# Patient Record
Sex: FEMALE | Race: WHITE | Employment: OTHER | URBAN - METROPOLITAN AREA
[De-identification: names, ages, dates, MRNs, and addresses within clinical notes are randomized per-mention and may not be internally consistent; named-entity substitution may affect disease eponyms.]

---

## 2021-01-01 ENCOUNTER — HOSPITAL ENCOUNTER (INPATIENT)
Age: 76
LOS: 9 days | Discharge: HOSPICE | DRG: 166 | End: 2021-04-29
Attending: INTERNAL MEDICINE | Admitting: INTERNAL MEDICINE
Payer: MEDICARE

## 2021-01-01 ENCOUNTER — ANESTHESIA (OUTPATIENT)
Dept: ENDOSCOPY | Age: 76
DRG: 166 | End: 2021-01-01
Payer: MEDICARE

## 2021-01-01 ENCOUNTER — APPOINTMENT (OUTPATIENT)
Dept: GENERAL RADIOLOGY | Age: 76
DRG: 166 | End: 2021-01-01
Attending: INTERNAL MEDICINE
Payer: MEDICARE

## 2021-01-01 ENCOUNTER — OFFICE VISIT (OUTPATIENT)
Dept: FAMILY MEDICINE CLINIC | Age: 76
End: 2021-01-01
Payer: MEDICARE

## 2021-01-01 ENCOUNTER — APPOINTMENT (OUTPATIENT)
Dept: ENDOSCOPY | Age: 76
DRG: 166 | End: 2021-01-01
Attending: INTERNAL MEDICINE
Payer: MEDICARE

## 2021-01-01 ENCOUNTER — TELEPHONE (OUTPATIENT)
Dept: FAMILY MEDICINE CLINIC | Age: 76
End: 2021-01-01

## 2021-01-01 ENCOUNTER — ANESTHESIA EVENT (OUTPATIENT)
Dept: ENDOSCOPY | Age: 76
DRG: 166 | End: 2021-01-01
Payer: MEDICARE

## 2021-01-01 ENCOUNTER — APPOINTMENT (OUTPATIENT)
Dept: GENERAL RADIOLOGY | Age: 76
DRG: 166 | End: 2021-01-01
Attending: EMERGENCY MEDICINE
Payer: MEDICARE

## 2021-01-01 ENCOUNTER — APPOINTMENT (OUTPATIENT)
Dept: CT IMAGING | Age: 76
DRG: 166 | End: 2021-01-01
Attending: INTERNAL MEDICINE
Payer: MEDICARE

## 2021-01-01 ENCOUNTER — HOSPITAL ENCOUNTER (INPATIENT)
Age: 76
LOS: 2 days | DRG: 951 | End: 2021-05-01
Attending: FAMILY MEDICINE | Admitting: FAMILY MEDICINE
Payer: OTHER MISCELLANEOUS

## 2021-01-01 ENCOUNTER — HOSPICE ADMISSION (OUTPATIENT)
Dept: HOSPICE | Facility: HOSPICE | Age: 76
End: 2021-01-01
Payer: MEDICARE

## 2021-01-01 ENCOUNTER — APPOINTMENT (OUTPATIENT)
Dept: CT IMAGING | Age: 76
DRG: 166 | End: 2021-01-01
Attending: PHYSICIAN ASSISTANT
Payer: MEDICARE

## 2021-01-01 ENCOUNTER — HOME CARE VISIT (OUTPATIENT)
Dept: HOSPICE | Facility: HOSPICE | Age: 76
End: 2021-01-01
Payer: MEDICARE

## 2021-01-01 ENCOUNTER — HOSPITAL ENCOUNTER (OUTPATIENT)
Dept: GENERAL RADIOLOGY | Age: 76
Discharge: HOME | End: 2021-03-22
Payer: MEDICARE

## 2021-01-01 VITALS
DIASTOLIC BLOOD PRESSURE: 65 MMHG | OXYGEN SATURATION: 97 % | TEMPERATURE: 97.5 F | HEART RATE: 87 BPM | SYSTOLIC BLOOD PRESSURE: 132 MMHG | BODY MASS INDEX: 14.55 KG/M2 | WEIGHT: 96 LBS | HEIGHT: 68 IN

## 2021-01-01 VITALS
RESPIRATION RATE: 16 BRPM | HEART RATE: 94 BPM | WEIGHT: 81.99 LBS | SYSTOLIC BLOOD PRESSURE: 149 MMHG | BODY MASS INDEX: 12.43 KG/M2 | HEIGHT: 68 IN | DIASTOLIC BLOOD PRESSURE: 77 MMHG | TEMPERATURE: 97.8 F | OXYGEN SATURATION: 94 %

## 2021-01-01 VITALS
OXYGEN SATURATION: 96 % | HEART RATE: 100 BPM | TEMPERATURE: 98.7 F | SYSTOLIC BLOOD PRESSURE: 105 MMHG | DIASTOLIC BLOOD PRESSURE: 59 MMHG | RESPIRATION RATE: 16 BRPM

## 2021-01-01 VITALS
DIASTOLIC BLOOD PRESSURE: 56 MMHG | TEMPERATURE: 97.5 F | HEIGHT: 68 IN | BODY MASS INDEX: 14.4 KG/M2 | HEART RATE: 91 BPM | OXYGEN SATURATION: 96 % | WEIGHT: 95 LBS | SYSTOLIC BLOOD PRESSURE: 121 MMHG

## 2021-01-01 DIAGNOSIS — M25.511 CHRONIC RIGHT SHOULDER PAIN: ICD-10-CM

## 2021-01-01 DIAGNOSIS — R13.12 OROPHARYNGEAL DYSPHAGIA: ICD-10-CM

## 2021-01-01 DIAGNOSIS — G89.29 CHRONIC RIGHT SHOULDER PAIN: ICD-10-CM

## 2021-01-01 DIAGNOSIS — B37.81 CANDIDA ESOPHAGITIS (HCC): ICD-10-CM

## 2021-01-01 DIAGNOSIS — R07.9 CHEST PAIN, UNSPECIFIED TYPE: ICD-10-CM

## 2021-01-01 DIAGNOSIS — S22.060A COMPRESSION FRACTURE OF T8 VERTEBRA, INITIAL ENCOUNTER (HCC): ICD-10-CM

## 2021-01-01 DIAGNOSIS — M54.6 CHRONIC RIGHT-SIDED THORACIC BACK PAIN: ICD-10-CM

## 2021-01-01 DIAGNOSIS — C34.90 MALIGNANT NEOPLASM OF LUNG, UNSPECIFIED LATERALITY, UNSPECIFIED PART OF LUNG (HCC): ICD-10-CM

## 2021-01-01 DIAGNOSIS — R05.9 COUGH: ICD-10-CM

## 2021-01-01 DIAGNOSIS — Z51.5 HOSPICE CARE PATIENT: ICD-10-CM

## 2021-01-01 DIAGNOSIS — G89.29 CHRONIC RIGHT-SIDED THORACIC BACK PAIN: ICD-10-CM

## 2021-01-01 DIAGNOSIS — R91.8 RIGHT LOWER LOBE LUNG MASS: Primary | ICD-10-CM

## 2021-01-01 DIAGNOSIS — G89.3 CANCER ASSOCIATED PAIN: ICD-10-CM

## 2021-01-01 DIAGNOSIS — C79.51 MALIGNANT NEOPLASM METASTATIC TO BONE (HCC): ICD-10-CM

## 2021-01-01 DIAGNOSIS — R91.8 LUNG MASS: ICD-10-CM

## 2021-01-01 DIAGNOSIS — D75.839 THROMBOCYTOSIS: Primary | ICD-10-CM

## 2021-01-01 DIAGNOSIS — R63.4 UNINTENTIONAL WEIGHT LOSS: ICD-10-CM

## 2021-01-01 LAB
ABO + RH BLD: NORMAL
ALBUMIN SERPL-MCNC: 2.2 G/DL (ref 3.5–5)
ALBUMIN SERPL-MCNC: 2.3 G/DL (ref 3.5–5)
ALBUMIN SERPL-MCNC: 2.4 G/DL (ref 3.5–5)
ALBUMIN SERPL-MCNC: 2.5 G/DL (ref 3.5–5)
ALBUMIN SERPL-MCNC: 2.6 G/DL (ref 3.5–5)
ALBUMIN/GLOB SERPL: 0.5 {RATIO} (ref 1.1–2.2)
ALBUMIN/GLOB SERPL: 0.5 {RATIO} (ref 1.1–2.2)
ALBUMIN/GLOB SERPL: 0.6 {RATIO} (ref 1.1–2.2)
ALP SERPL-CCNC: 105 U/L (ref 45–117)
ALP SERPL-CCNC: 107 U/L (ref 45–117)
ALP SERPL-CCNC: 110 U/L (ref 45–117)
ALP SERPL-CCNC: 112 U/L (ref 45–117)
ALP SERPL-CCNC: 121 U/L (ref 45–117)
ALT SERPL-CCNC: 10 U/L (ref 12–78)
ALT SERPL-CCNC: 11 U/L (ref 12–78)
ALT SERPL-CCNC: 12 U/L (ref 12–78)
ALT SERPL-CCNC: 13 U/L (ref 12–78)
ALT SERPL-CCNC: 14 U/L (ref 12–78)
ANION GAP SERPL CALC-SCNC: 2 MMOL/L (ref 5–15)
ANION GAP SERPL CALC-SCNC: 4 MMOL/L (ref 5–15)
ANION GAP SERPL CALC-SCNC: 5 MMOL/L (ref 5–15)
ANION GAP SERPL CALC-SCNC: 6 MMOL/L (ref 5–15)
ANION GAP SERPL CALC-SCNC: 7 MMOL/L (ref 5–15)
APPEARANCE UR: ABNORMAL
APTT PPP: 31.9 SEC (ref 21.2–34.1)
AST SERPL W P-5'-P-CCNC: 10 U/L (ref 15–37)
AST SERPL W P-5'-P-CCNC: 10 U/L (ref 15–37)
AST SERPL W P-5'-P-CCNC: 16 U/L (ref 15–37)
AST SERPL W P-5'-P-CCNC: 6 U/L (ref 15–37)
AST SERPL W P-5'-P-CCNC: 8 U/L (ref 15–37)
ATRIAL RATE: 88 BPM
BACTERIA SPEC CULT: NORMAL
BACTERIA SPEC CULT: NORMAL
BACTERIA URNS QL MICRO: ABNORMAL /HPF
BASOPHILS # BLD: 0 K/UL (ref 0–0.1)
BASOPHILS NFR BLD: 0 % (ref 0–1)
BILIRUB SERPL-MCNC: 0.3 MG/DL (ref 0.2–1)
BILIRUB SERPL-MCNC: 0.3 MG/DL (ref 0.2–1)
BILIRUB SERPL-MCNC: 0.4 MG/DL (ref 0.2–1)
BILIRUB SERPL-MCNC: 0.4 MG/DL (ref 0.2–1)
BILIRUB SERPL-MCNC: 0.6 MG/DL (ref 0.2–1)
BILIRUB UR QL: NEGATIVE
BLOOD GROUP ANTIBODIES SERPL: NEGATIVE
BNP SERPL-MCNC: 489 PG/ML
BUN SERPL-MCNC: 11 MG/DL (ref 6–20)
BUN SERPL-MCNC: 11 MG/DL (ref 6–20)
BUN SERPL-MCNC: 12 MG/DL (ref 6–20)
BUN SERPL-MCNC: 13 MG/DL (ref 6–20)
BUN SERPL-MCNC: 14 MG/DL (ref 6–20)
BUN SERPL-MCNC: 14 MG/DL (ref 6–20)
BUN SERPL-MCNC: 15 MG/DL (ref 6–20)
BUN SERPL-MCNC: 15 MG/DL (ref 6–20)
BUN SERPL-MCNC: 19 MG/DL (ref 6–20)
BUN SERPL-MCNC: 23 MG/DL (ref 6–20)
BUN SERPL-MCNC: 35 MG/DL (ref 6–20)
BUN/CREAT SERPL: 22 (ref 12–20)
BUN/CREAT SERPL: 23 (ref 12–20)
BUN/CREAT SERPL: 23 (ref 12–20)
BUN/CREAT SERPL: 25 (ref 12–20)
BUN/CREAT SERPL: 26 (ref 12–20)
BUN/CREAT SERPL: 26 (ref 12–20)
BUN/CREAT SERPL: 28 (ref 12–20)
BUN/CREAT SERPL: 29 (ref 12–20)
BUN/CREAT SERPL: 35 (ref 12–20)
BUN/CREAT SERPL: 35 (ref 12–20)
BUN/CREAT SERPL: 36 (ref 12–20)
CA-I BLD-MCNC: 10 MG/DL (ref 8.5–10.1)
CA-I BLD-MCNC: 10.2 MG/DL (ref 8.5–10.1)
CA-I BLD-MCNC: 10.2 MG/DL (ref 8.5–10.1)
CA-I BLD-MCNC: 10.6 MG/DL (ref 8.5–10.1)
CA-I BLD-MCNC: 10.7 MG/DL (ref 8.5–10.1)
CA-I BLD-MCNC: 11.1 MG/DL (ref 8.5–10.1)
CA-I BLD-MCNC: 11.1 MG/DL (ref 8.5–10.1)
CA-I BLD-MCNC: 12 MG/DL (ref 8.5–10.1)
CA-I BLD-MCNC: 12.2 MG/DL (ref 8.5–10.1)
CA-I BLD-MCNC: 12.5 MG/DL (ref 8.5–10.1)
CA-I BLD-MCNC: 12.7 MG/DL (ref 8.5–10.1)
CALCULATED P AXIS, ECG09: 76 DEGREES
CALCULATED R AXIS, ECG10: -9 DEGREES
CALCULATED T AXIS, ECG11: 45 DEGREES
CHLORIDE SERPL-SCNC: 104 MMOL/L (ref 97–108)
CHLORIDE SERPL-SCNC: 105 MMOL/L (ref 97–108)
CHLORIDE SERPL-SCNC: 106 MMOL/L (ref 97–108)
CHLORIDE SERPL-SCNC: 107 MMOL/L (ref 97–108)
CHLORIDE SERPL-SCNC: 108 MMOL/L (ref 97–108)
CHLORIDE SERPL-SCNC: 113 MMOL/L (ref 97–108)
CHLORIDE SERPL-SCNC: 116 MMOL/L (ref 97–108)
CK SERPL-CCNC: 23 NG/ML (ref 26–192)
CO2 SERPL-SCNC: 23 MMOL/L (ref 21–32)
CO2 SERPL-SCNC: 28 MMOL/L (ref 21–32)
CO2 SERPL-SCNC: 28 MMOL/L (ref 21–32)
CO2 SERPL-SCNC: 29 MMOL/L (ref 21–32)
CO2 SERPL-SCNC: 31 MMOL/L (ref 21–32)
CO2 SERPL-SCNC: 32 MMOL/L (ref 21–32)
COLOR UR: ABNORMAL
COVID-19 RAPID TEST, COVR: NOT DETECTED
CREAT SERPL-MCNC: 0.43 MG/DL (ref 0.55–1.02)
CREAT SERPL-MCNC: 0.47 MG/DL (ref 0.55–1.02)
CREAT SERPL-MCNC: 0.48 MG/DL (ref 0.55–1.02)
CREAT SERPL-MCNC: 0.54 MG/DL (ref 0.55–1.02)
CREAT SERPL-MCNC: 0.55 MG/DL (ref 0.55–1.02)
CREAT SERPL-MCNC: 0.57 MG/DL (ref 0.55–1.02)
CREAT SERPL-MCNC: 0.63 MG/DL (ref 0.55–1.02)
CREAT SERPL-MCNC: 0.78 MG/DL (ref 0.55–1.02)
CREAT SERPL-MCNC: 0.96 MG/DL (ref 0.55–1.02)
DIAGNOSIS, 93000: NORMAL
DIFFERENTIAL METHOD BLD: ABNORMAL
EOSINOPHIL # BLD: 0 K/UL (ref 0–0.4)
EOSINOPHIL # BLD: 0.1 K/UL (ref 0–0.4)
EOSINOPHIL NFR BLD: 0 % (ref 0–7)
ERYTHROCYTE [DISTWIDTH] IN BLOOD BY AUTOMATED COUNT: 17.4 % (ref 11.5–14.5)
ERYTHROCYTE [DISTWIDTH] IN BLOOD BY AUTOMATED COUNT: 17.4 % (ref 11.5–14.5)
ERYTHROCYTE [DISTWIDTH] IN BLOOD BY AUTOMATED COUNT: 17.6 % (ref 11.5–14.5)
ERYTHROCYTE [DISTWIDTH] IN BLOOD BY AUTOMATED COUNT: 17.8 % (ref 11.5–14.5)
ERYTHROCYTE [DISTWIDTH] IN BLOOD BY AUTOMATED COUNT: 17.9 % (ref 11.5–14.5)
ERYTHROCYTE [DISTWIDTH] IN BLOOD BY AUTOMATED COUNT: 18.3 % (ref 11.5–14.5)
ERYTHROCYTE [DISTWIDTH] IN BLOOD BY AUTOMATED COUNT: 18.6 % (ref 11.5–14.5)
ERYTHROCYTE [DISTWIDTH] IN BLOOD BY AUTOMATED COUNT: 18.8 % (ref 11.5–14.5)
FLUAV AG NPH QL IA: NEGATIVE
FLUBV AG NOSE QL IA: NEGATIVE
GLOBULIN SER CALC-MCNC: 4.1 G/DL (ref 2–4)
GLOBULIN SER CALC-MCNC: 4.2 G/DL (ref 2–4)
GLOBULIN SER CALC-MCNC: 4.3 G/DL (ref 2–4)
GLOBULIN SER CALC-MCNC: 4.4 G/DL (ref 2–4)
GLOBULIN SER CALC-MCNC: 4.4 G/DL (ref 2–4)
GLUCOSE BLD STRIP.AUTO-MCNC: 127 MG/DL (ref 65–100)
GLUCOSE SERPL-MCNC: 104 MG/DL (ref 65–100)
GLUCOSE SERPL-MCNC: 106 MG/DL (ref 65–100)
GLUCOSE SERPL-MCNC: 119 MG/DL (ref 65–100)
GLUCOSE SERPL-MCNC: 138 MG/DL (ref 65–100)
GLUCOSE SERPL-MCNC: 74 MG/DL (ref 65–100)
GLUCOSE SERPL-MCNC: 82 MG/DL (ref 65–100)
GLUCOSE SERPL-MCNC: 83 MG/DL (ref 65–100)
GLUCOSE SERPL-MCNC: 88 MG/DL (ref 65–100)
GLUCOSE SERPL-MCNC: 93 MG/DL (ref 65–100)
GLUCOSE SERPL-MCNC: 95 MG/DL (ref 65–100)
GLUCOSE SERPL-MCNC: 98 MG/DL (ref 65–100)
GLUCOSE UR STRIP.AUTO-MCNC: NEGATIVE MG/DL
GRAM STN SPEC: NORMAL
GRAM STN SPEC: NORMAL
HCT VFR BLD AUTO: 25.1 % (ref 35–47)
HCT VFR BLD AUTO: 27.7 % (ref 35–47)
HCT VFR BLD AUTO: 27.9 % (ref 35–47)
HCT VFR BLD AUTO: 28.1 % (ref 35–47)
HCT VFR BLD AUTO: 28.4 % (ref 35–47)
HCT VFR BLD AUTO: 28.9 % (ref 35–47)
HCT VFR BLD AUTO: 29 % (ref 35–47)
HCT VFR BLD AUTO: 29.5 % (ref 35–47)
HCT VFR BLD AUTO: 31 % (ref 35–47)
HCT VFR BLD AUTO: 31.8 % (ref 35–47)
HGB BLD-MCNC: 7.6 G/DL (ref 11.5–16)
HGB BLD-MCNC: 8.3 G/DL (ref 11.5–16)
HGB BLD-MCNC: 8.4 G/DL (ref 11.5–16)
HGB BLD-MCNC: 8.5 G/DL (ref 11.5–16)
HGB BLD-MCNC: 8.5 G/DL (ref 11.5–16)
HGB BLD-MCNC: 8.7 G/DL (ref 11.5–16)
HGB BLD-MCNC: 8.9 G/DL (ref 11.5–16)
HGB BLD-MCNC: 8.9 G/DL (ref 11.5–16)
HGB BLD-MCNC: 9.1 G/DL (ref 11.5–16)
HGB BLD-MCNC: 9.5 G/DL (ref 11.5–16)
HGB UR QL STRIP: ABNORMAL
IMM GRANULOCYTES # BLD AUTO: 0 K/UL (ref 0–0.04)
IMM GRANULOCYTES # BLD AUTO: 0.1 K/UL (ref 0–0.04)
IMM GRANULOCYTES NFR BLD AUTO: 0 % (ref 0–0.5)
INR PPP: 1.1 (ref 0.9–1.1)
IRON SATN MFR SERPL: 31 % (ref 20–50)
IRON SERPL-MCNC: 43 UG/DL (ref 35–150)
KETONES UR QL STRIP.AUTO: NEGATIVE MG/DL
LEUKOCYTE ESTERASE UR QL STRIP.AUTO: ABNORMAL
LYMPHOCYTES # BLD: 0.5 K/UL (ref 0.8–3.5)
LYMPHOCYTES # BLD: 0.6 K/UL (ref 0.8–3.5)
LYMPHOCYTES # BLD: 0.6 K/UL (ref 0.8–3.5)
LYMPHOCYTES # BLD: 0.8 K/UL (ref 0.8–3.5)
LYMPHOCYTES # BLD: 0.8 K/UL (ref 0.8–3.5)
LYMPHOCYTES # BLD: 0.9 K/UL (ref 0.8–3.5)
LYMPHOCYTES # BLD: 1.3 K/UL (ref 0.8–3.5)
LYMPHOCYTES # BLD: 1.3 K/UL (ref 0.8–3.5)
LYMPHOCYTES NFR BLD: 3 % (ref 12–49)
LYMPHOCYTES NFR BLD: 4 % (ref 12–49)
LYMPHOCYTES NFR BLD: 5 % (ref 12–49)
LYMPHOCYTES NFR BLD: 5 % (ref 12–49)
LYMPHOCYTES NFR BLD: 8 % (ref 12–49)
LYMPHOCYTES NFR BLD: 8 % (ref 12–49)
MAGNESIUM SERPL-MCNC: 2.1 MG/DL (ref 1.6–2.4)
MAGNESIUM SERPL-MCNC: 2.2 MG/DL (ref 1.6–2.4)
MAGNESIUM SERPL-MCNC: 2.2 MG/DL (ref 1.6–2.4)
MAGNESIUM SERPL-MCNC: 2.3 MG/DL (ref 1.6–2.4)
MCH RBC QN AUTO: 24.7 PG (ref 26–34)
MCH RBC QN AUTO: 24.8 PG (ref 26–34)
MCH RBC QN AUTO: 24.9 PG (ref 26–34)
MCH RBC QN AUTO: 24.9 PG (ref 26–34)
MCH RBC QN AUTO: 25 PG (ref 26–34)
MCH RBC QN AUTO: 25.1 PG (ref 26–34)
MCH RBC QN AUTO: 25.1 PG (ref 26–34)
MCH RBC QN AUTO: 25.2 PG (ref 26–34)
MCHC RBC AUTO-ENTMCNC: 29.4 G/DL (ref 30–36.5)
MCHC RBC AUTO-ENTMCNC: 29.7 G/DL (ref 30–36.5)
MCHC RBC AUTO-ENTMCNC: 29.9 G/DL (ref 30–36.5)
MCHC RBC AUTO-ENTMCNC: 29.9 G/DL (ref 30–36.5)
MCHC RBC AUTO-ENTMCNC: 30 G/DL (ref 30–36.5)
MCHC RBC AUTO-ENTMCNC: 30.2 G/DL (ref 30–36.5)
MCHC RBC AUTO-ENTMCNC: 30.2 G/DL (ref 30–36.5)
MCHC RBC AUTO-ENTMCNC: 30.3 G/DL (ref 30–36.5)
MCHC RBC AUTO-ENTMCNC: 30.3 G/DL (ref 30–36.5)
MCHC RBC AUTO-ENTMCNC: 30.8 G/DL (ref 30–36.5)
MCV RBC AUTO: 81.4 FL (ref 80–99)
MCV RBC AUTO: 81.8 FL (ref 80–99)
MCV RBC AUTO: 82.2 FL (ref 80–99)
MCV RBC AUTO: 82.4 FL (ref 80–99)
MCV RBC AUTO: 82.9 FL (ref 80–99)
MCV RBC AUTO: 83 FL (ref 80–99)
MCV RBC AUTO: 83 FL (ref 80–99)
MCV RBC AUTO: 83.6 FL (ref 80–99)
MCV RBC AUTO: 83.7 FL (ref 80–99)
MCV RBC AUTO: 84.2 FL (ref 80–99)
MONOCYTES # BLD: 0.4 K/UL (ref 0–1)
MONOCYTES # BLD: 0.6 K/UL (ref 0–1)
MONOCYTES # BLD: 0.7 K/UL (ref 0–1)
MONOCYTES # BLD: 0.8 K/UL (ref 0–1)
MONOCYTES # BLD: 0.9 K/UL (ref 0–1)
MONOCYTES # BLD: 0.9 K/UL (ref 0–1)
MONOCYTES # BLD: 1 K/UL (ref 0–1)
MONOCYTES # BLD: 1 K/UL (ref 0–1)
MONOCYTES NFR BLD: 3 % (ref 5–13)
MONOCYTES NFR BLD: 3 % (ref 5–13)
MONOCYTES NFR BLD: 4 % (ref 5–13)
MONOCYTES NFR BLD: 4 % (ref 5–13)
MONOCYTES NFR BLD: 5 % (ref 5–13)
MONOCYTES NFR BLD: 5 % (ref 5–13)
MONOCYTES NFR BLD: 6 % (ref 5–13)
MONOCYTES NFR BLD: 6 % (ref 5–13)
MUCOUS THREADS URNS QL MICRO: ABNORMAL /LPF
NEUTS SEG # BLD: 13.8 K/UL (ref 1.8–8)
NEUTS SEG # BLD: 14.2 K/UL (ref 1.8–8)
NEUTS SEG # BLD: 14.7 K/UL (ref 1.8–8)
NEUTS SEG # BLD: 15.2 K/UL (ref 1.8–8)
NEUTS SEG # BLD: 15.6 K/UL (ref 1.8–8)
NEUTS SEG # BLD: 16 K/UL (ref 1.8–8)
NEUTS SEG # BLD: 19 K/UL (ref 1.8–8)
NEUTS SEG # BLD: 19.3 K/UL (ref 1.8–8)
NEUTS SEG NFR BLD: 86 % (ref 32–75)
NEUTS SEG NFR BLD: 86 % (ref 32–75)
NEUTS SEG NFR BLD: 91 % (ref 32–75)
NEUTS SEG NFR BLD: 92 % (ref 32–75)
NEUTS SEG NFR BLD: 94 % (ref 32–75)
NITRITE UR QL STRIP.AUTO: POSITIVE
P-R INTERVAL, ECG05: 154 MS
PERFORMED BY, TECHID: ABNORMAL
PH UR STRIP: 5 [PH] (ref 5–8)
PLATELET # BLD AUTO: 350 K/UL (ref 150–400)
PLATELET # BLD AUTO: 437 K/UL (ref 150–400)
PLATELET # BLD AUTO: 523 K/UL (ref 150–400)
PLATELET # BLD AUTO: 534 K/UL (ref 150–400)
PLATELET # BLD AUTO: 541 K/UL (ref 150–400)
PLATELET # BLD AUTO: 549 K/UL (ref 150–400)
PLATELET # BLD AUTO: 565 K/UL (ref 150–400)
PLATELET # BLD AUTO: 576 K/UL (ref 150–400)
PLATELET # BLD AUTO: 604 K/UL (ref 150–400)
PLATELET # BLD AUTO: 606 K/UL (ref 150–400)
PMV BLD AUTO: 8.5 FL (ref 8.9–12.9)
PMV BLD AUTO: 8.6 FL (ref 8.9–12.9)
PMV BLD AUTO: 8.7 FL (ref 8.9–12.9)
PMV BLD AUTO: 8.8 FL (ref 8.9–12.9)
PMV BLD AUTO: 9.2 FL (ref 8.9–12.9)
POTASSIUM SERPL-SCNC: 2.6 MMOL/L (ref 3.5–5.1)
POTASSIUM SERPL-SCNC: 2.7 MMOL/L (ref 3.5–5.1)
POTASSIUM SERPL-SCNC: 3 MMOL/L (ref 3.5–5.1)
POTASSIUM SERPL-SCNC: 3.2 MMOL/L (ref 3.5–5.1)
POTASSIUM SERPL-SCNC: 3.2 MMOL/L (ref 3.5–5.1)
POTASSIUM SERPL-SCNC: 3.4 MMOL/L (ref 3.5–5.1)
POTASSIUM SERPL-SCNC: 3.7 MMOL/L (ref 3.5–5.1)
POTASSIUM SERPL-SCNC: 3.8 MMOL/L (ref 3.5–5.1)
POTASSIUM SERPL-SCNC: 3.8 MMOL/L (ref 3.5–5.1)
POTASSIUM SERPL-SCNC: 4.5 MMOL/L (ref 3.5–5.1)
PROT SERPL-MCNC: 6.3 G/DL (ref 6.4–8.2)
PROT SERPL-MCNC: 6.5 G/DL (ref 6.4–8.2)
PROT SERPL-MCNC: 6.7 G/DL (ref 6.4–8.2)
PROT SERPL-MCNC: 6.9 G/DL (ref 6.4–8.2)
PROT SERPL-MCNC: 7 G/DL (ref 6.4–8.2)
PROT UR STRIP-MCNC: NEGATIVE MG/DL
PROTHROMBIN TIME: 14.2 SEC (ref 11.9–14.7)
Q-T INTERVAL, ECG07: 340 MS
QRS DURATION, ECG06: 70 MS
QTC CALCULATION (BEZET), ECG08: 411 MS
RBC # BLD AUTO: 3.07 M/UL (ref 3.8–5.2)
RBC # BLD AUTO: 3.36 M/UL (ref 3.8–5.2)
RBC # BLD AUTO: 3.37 M/UL (ref 3.8–5.2)
RBC # BLD AUTO: 3.39 M/UL (ref 3.8–5.2)
RBC # BLD AUTO: 3.42 M/UL (ref 3.8–5.2)
RBC # BLD AUTO: 3.52 M/UL (ref 3.8–5.2)
RBC # BLD AUTO: 3.53 M/UL (ref 3.8–5.2)
RBC # BLD AUTO: 3.55 M/UL (ref 3.8–5.2)
RBC # BLD AUTO: 3.68 M/UL (ref 3.8–5.2)
RBC # BLD AUTO: 3.8 M/UL (ref 3.8–5.2)
RBC #/AREA URNS HPF: ABNORMAL /HPF (ref 0–5)
REFERENCE LAB,REFLB: NORMAL
SARS-COV-2, COV2: NORMAL
SODIUM SERPL-SCNC: 135 MMOL/L (ref 136–145)
SODIUM SERPL-SCNC: 138 MMOL/L (ref 136–145)
SODIUM SERPL-SCNC: 139 MMOL/L (ref 136–145)
SODIUM SERPL-SCNC: 140 MMOL/L (ref 136–145)
SODIUM SERPL-SCNC: 141 MMOL/L (ref 136–145)
SODIUM SERPL-SCNC: 143 MMOL/L (ref 136–145)
SODIUM SERPL-SCNC: 146 MMOL/L (ref 136–145)
SODIUM SERPL-SCNC: 149 MMOL/L (ref 136–145)
SPECIAL REQUESTS,SREQ: NORMAL
SPECIAL REQUESTS,SREQ: NORMAL
SPECIMEN EXP DATE BLD: NORMAL
SPECIMEN SOURCE: NORMAL
TEST DESCRIPTION:,ATST: NORMAL
THERAPEUTIC RANGE,PTTT: NORMAL SEC (ref 82–109)
TIBC SERPL-MCNC: 138 UG/DL (ref 250–450)
TROPONIN I SERPL-MCNC: <0.05 NG/ML
TSH SERPL DL<=0.05 MIU/L-ACNC: 1.99 UIU/ML (ref 0.36–3.74)
UROBILINOGEN UR QL STRIP.AUTO: 0.1 EU/DL (ref 0.1–1)
VENTRICULAR RATE, ECG03: 88 BPM
WBC # BLD AUTO: 13.4 K/UL (ref 3.6–11)
WBC # BLD AUTO: 15 K/UL (ref 3.6–11)
WBC # BLD AUTO: 16 K/UL (ref 3.6–11)
WBC # BLD AUTO: 16.6 K/UL (ref 3.6–11)
WBC # BLD AUTO: 16.7 K/UL (ref 3.6–11)
WBC # BLD AUTO: 17 K/UL (ref 3.6–11)
WBC # BLD AUTO: 17 K/UL (ref 3.6–11)
WBC # BLD AUTO: 17.5 K/UL (ref 3.6–11)
WBC # BLD AUTO: 20.5 K/UL (ref 3.6–11)
WBC # BLD AUTO: 20.6 K/UL (ref 3.6–11)
WBC URNS QL MICRO: ABNORMAL /HPF (ref 0–4)

## 2021-01-01 PROCEDURE — G8427 DOCREV CUR MEDS BY ELIG CLIN: HCPCS | Performed by: FAMILY MEDICINE

## 2021-01-01 PROCEDURE — 74011250637 HC RX REV CODE- 250/637: Performed by: INTERNAL MEDICINE

## 2021-01-01 PROCEDURE — 36415 COLL VENOUS BLD VENIPUNCTURE: CPT

## 2021-01-01 PROCEDURE — 65270000029 HC RM PRIVATE

## 2021-01-01 PROCEDURE — 2709999900 HC NON-CHARGEABLE SUPPLY: Performed by: INTERNAL MEDICINE

## 2021-01-01 PROCEDURE — G8419 CALC BMI OUT NRM PARAM NOF/U: HCPCS | Performed by: FAMILY MEDICINE

## 2021-01-01 PROCEDURE — 74011250636 HC RX REV CODE- 250/636: Performed by: FAMILY MEDICINE

## 2021-01-01 PROCEDURE — 88360 TUMOR IMMUNOHISTOCHEM/MANUAL: CPT

## 2021-01-01 PROCEDURE — 74011000258 HC RX REV CODE- 258: Performed by: INTERNAL MEDICINE

## 2021-01-01 PROCEDURE — 74011250636 HC RX REV CODE- 250/636: Performed by: ANESTHESIOLOGY

## 2021-01-01 PROCEDURE — 1101F PT FALLS ASSESS-DOCD LE1/YR: CPT | Performed by: FAMILY MEDICINE

## 2021-01-01 PROCEDURE — 71045 X-RAY EXAM CHEST 1 VIEW: CPT

## 2021-01-01 PROCEDURE — 74011250636 HC RX REV CODE- 250/636: Performed by: NURSE ANESTHETIST, CERTIFIED REGISTERED

## 2021-01-01 PROCEDURE — 0WBC3ZX EXCISION OF MEDIASTINUM, PERCUTANEOUS APPROACH, DIAGNOSTIC: ICD-10-PCS | Performed by: INTERNAL MEDICINE

## 2021-01-01 PROCEDURE — 76040000003: Performed by: INTERNAL MEDICINE

## 2021-01-01 PROCEDURE — 88108 CYTOPATH CONCENTRATE TECH: CPT

## 2021-01-01 PROCEDURE — 83735 ASSAY OF MAGNESIUM: CPT

## 2021-01-01 PROCEDURE — 83880 ASSAY OF NATRIURETIC PEPTIDE: CPT

## 2021-01-01 PROCEDURE — 74011250636 HC RX REV CODE- 250/636: Performed by: INTERNAL MEDICINE

## 2021-01-01 PROCEDURE — 88305 TISSUE EXAM BY PATHOLOGIST: CPT

## 2021-01-01 PROCEDURE — 84132 ASSAY OF SERUM POTASSIUM: CPT

## 2021-01-01 PROCEDURE — G8510 SCR DEP NEG, NO PLAN REQD: HCPCS | Performed by: FAMILY MEDICINE

## 2021-01-01 PROCEDURE — 74011250636 HC RX REV CODE- 250/636: Performed by: HOSPITALIST

## 2021-01-01 PROCEDURE — 94760 N-INVAS EAR/PLS OXIMETRY 1: CPT

## 2021-01-01 PROCEDURE — 71260 CT THORAX DX C+: CPT

## 2021-01-01 PROCEDURE — 3017F COLORECTAL CA SCREEN DOC REV: CPT | Performed by: FAMILY MEDICINE

## 2021-01-01 PROCEDURE — 85025 COMPLETE CBC W/AUTO DIFF WBC: CPT

## 2021-01-01 PROCEDURE — 74011000250 HC RX REV CODE- 250: Performed by: FAMILY MEDICINE

## 2021-01-01 PROCEDURE — 74019 RADEX ABDOMEN 2 VIEWS: CPT

## 2021-01-01 PROCEDURE — 85610 PROTHROMBIN TIME: CPT

## 2021-01-01 PROCEDURE — 81001 URINALYSIS AUTO W/SCOPE: CPT

## 2021-01-01 PROCEDURE — 0BBD8ZX EXCISION OF RIGHT MIDDLE LUNG LOBE, VIA NATURAL OR ARTIFICIAL OPENING ENDOSCOPIC, DIAGNOSTIC: ICD-10-PCS | Performed by: INTERNAL MEDICINE

## 2021-01-01 PROCEDURE — 3336500001 HSPC ELECTION

## 2021-01-01 PROCEDURE — 99285 EMERGENCY DEPT VISIT HI MDM: CPT

## 2021-01-01 PROCEDURE — 87040 BLOOD CULTURE FOR BACTERIA: CPT

## 2021-01-01 PROCEDURE — 81455 SO/HL 51/>GSAP DNA/DNA&RNA: CPT

## 2021-01-01 PROCEDURE — 0B9K3ZX DRAINAGE OF RIGHT LUNG, PERCUTANEOUS APPROACH, DIAGNOSTIC: ICD-10-PCS | Performed by: INTERNAL MEDICINE

## 2021-01-01 PROCEDURE — 88381 MICRODISSECTION MANUAL: CPT

## 2021-01-01 PROCEDURE — 0DH63UZ INSERTION OF FEEDING DEVICE INTO STOMACH, PERCUTANEOUS APPROACH: ICD-10-PCS | Performed by: INTERNAL MEDICINE

## 2021-01-01 PROCEDURE — 76060000035 HC ANESTHESIA 2 TO 2.5 HR: Performed by: INTERNAL MEDICINE

## 2021-01-01 PROCEDURE — 84484 ASSAY OF TROPONIN QUANT: CPT

## 2021-01-01 PROCEDURE — 80053 COMPREHEN METABOLIC PANEL: CPT

## 2021-01-01 PROCEDURE — 77030037041 HC FCPS HOT BIOP ENDOSC RAD JAW DISP BSC -B: Performed by: INTERNAL MEDICINE

## 2021-01-01 PROCEDURE — G8536 NO DOC ELDER MAL SCRN: HCPCS | Performed by: FAMILY MEDICINE

## 2021-01-01 PROCEDURE — 99222 1ST HOSP IP/OBS MODERATE 55: CPT | Performed by: OTOLARYNGOLOGY

## 2021-01-01 PROCEDURE — 87804 INFLUENZA ASSAY W/OPTIC: CPT

## 2021-01-01 PROCEDURE — 88342 IMHCHEM/IMCYTCHM 1ST ANTB: CPT

## 2021-01-01 PROCEDURE — 1090F PRES/ABSN URINE INCON ASSESS: CPT | Performed by: FAMILY MEDICINE

## 2021-01-01 PROCEDURE — 82962 GLUCOSE BLOOD TEST: CPT

## 2021-01-01 PROCEDURE — 74011000250 HC RX REV CODE- 250: Performed by: INTERNAL MEDICINE

## 2021-01-01 PROCEDURE — 99223 1ST HOSP IP/OBS HIGH 75: CPT | Performed by: FAMILY MEDICINE

## 2021-01-01 PROCEDURE — 0BB38ZX EXCISION OF RIGHT MAIN BRONCHUS, VIA NATURAL OR ARTIFICIAL OPENING ENDOSCOPIC, DIAGNOSTIC: ICD-10-PCS | Performed by: INTERNAL MEDICINE

## 2021-01-01 PROCEDURE — G8400 PT W/DXA NO RESULTS DOC: HCPCS | Performed by: FAMILY MEDICINE

## 2021-01-01 PROCEDURE — G8432 DEP SCR NOT DOC, RNG: HCPCS | Performed by: FAMILY MEDICINE

## 2021-01-01 PROCEDURE — 74011000636 HC RX REV CODE- 636: Performed by: PHYSICIAN ASSISTANT

## 2021-01-01 PROCEDURE — 83540 ASSAY OF IRON: CPT

## 2021-01-01 PROCEDURE — 0BDF8ZX EXTRACTION OF RIGHT LOWER LUNG LOBE, VIA NATURAL OR ARTIFICIAL OPENING ENDOSCOPIC, DIAGNOSTIC: ICD-10-PCS | Performed by: INTERNAL MEDICINE

## 2021-01-01 PROCEDURE — 88172 CYTP DX EVAL FNA 1ST EA SITE: CPT

## 2021-01-01 PROCEDURE — 86901 BLOOD TYPING SEROLOGIC RH(D): CPT

## 2021-01-01 PROCEDURE — 76000 FLUOROSCOPY <1 HR PHYS/QHP: CPT

## 2021-01-01 PROCEDURE — 99233 SBSQ HOSP IP/OBS HIGH 50: CPT | Performed by: FAMILY MEDICINE

## 2021-01-01 PROCEDURE — 76040000004: Performed by: INTERNAL MEDICINE

## 2021-01-01 PROCEDURE — 87070 CULTURE OTHR SPECIMN AEROBIC: CPT

## 2021-01-01 PROCEDURE — 77030028690 HC NDL ASPIR ULTRSND BSC -E: Performed by: INTERNAL MEDICINE

## 2021-01-01 PROCEDURE — 74011000250 HC RX REV CODE- 250: Performed by: NURSE ANESTHETIST, CERTIFIED REGISTERED

## 2021-01-01 PROCEDURE — 70491 CT SOFT TISSUE NECK W/DYE: CPT

## 2021-01-01 PROCEDURE — 77030012699 HC VLV SUC CNTRL OCOA -A: Performed by: INTERNAL MEDICINE

## 2021-01-01 PROCEDURE — 88173 CYTOPATH EVAL FNA REPORT: CPT

## 2021-01-01 PROCEDURE — 87635 SARS-COV-2 COVID-19 AMP PRB: CPT

## 2021-01-01 PROCEDURE — 0BJ08ZZ INSPECTION OF TRACHEOBRONCHIAL TREE, VIA NATURAL OR ARTIFICIAL OPENING ENDOSCOPIC: ICD-10-PCS | Performed by: INTERNAL MEDICINE

## 2021-01-01 PROCEDURE — 96374 THER/PROPH/DIAG INJ IV PUSH: CPT

## 2021-01-01 PROCEDURE — 84443 ASSAY THYROID STIM HORMONE: CPT

## 2021-01-01 PROCEDURE — 74011000250 HC RX REV CODE- 250: Performed by: ANESTHESIOLOGY

## 2021-01-01 PROCEDURE — 99203 OFFICE O/P NEW LOW 30 MIN: CPT | Performed by: FAMILY MEDICINE

## 2021-01-01 PROCEDURE — 80048 BASIC METABOLIC PNL TOTAL CA: CPT

## 2021-01-01 PROCEDURE — 0656 HSPC GENERAL INPATIENT

## 2021-01-01 PROCEDURE — 73030 X-RAY EXAM OF SHOULDER: CPT

## 2021-01-01 PROCEDURE — 99214 OFFICE O/P EST MOD 30 MIN: CPT | Performed by: FAMILY MEDICINE

## 2021-01-01 PROCEDURE — 72072 X-RAY EXAM THORAC SPINE 3VWS: CPT

## 2021-01-01 PROCEDURE — 85730 THROMBOPLASTIN TIME PARTIAL: CPT

## 2021-01-01 PROCEDURE — 77030020268 HC MISC GENERAL SUPPLY: Performed by: INTERNAL MEDICINE

## 2021-01-01 PROCEDURE — 93005 ELECTROCARDIOGRAM TRACING: CPT

## 2021-01-01 PROCEDURE — 85027 COMPLETE CBC AUTOMATED: CPT

## 2021-01-01 PROCEDURE — 70450 CT HEAD/BRAIN W/O DYE: CPT

## 2021-01-01 PROCEDURE — 74011250636 HC RX REV CODE- 250/636: Performed by: PHYSICIAN ASSISTANT

## 2021-01-01 PROCEDURE — 82550 ASSAY OF CK (CPK): CPT

## 2021-01-01 PROCEDURE — 76060000036 HC ANESTHESIA 2.5 TO 3 HR: Performed by: INTERNAL MEDICINE

## 2021-01-01 RX ORDER — LIDOCAINE HYDROCHLORIDE 20 MG/ML
INJECTION, SOLUTION EPIDURAL; INFILTRATION; INTRACAUDAL; PERINEURAL
Status: COMPLETED
Start: 2021-01-01 | End: 2021-01-01

## 2021-01-01 RX ORDER — POTASSIUM CHLORIDE 20 MEQ/1
20 TABLET, EXTENDED RELEASE ORAL
Status: COMPLETED | OUTPATIENT
Start: 2021-01-01 | End: 2021-01-01

## 2021-01-01 RX ORDER — FACIAL-BODY WIPES
10 EACH TOPICAL DAILY PRN
Status: DISCONTINUED | OUTPATIENT
Start: 2021-01-01 | End: 2021-05-02 | Stop reason: HOSPADM

## 2021-01-01 RX ORDER — LIDOCAINE HYDROCHLORIDE 10 MG/ML
INJECTION INFILTRATION; PERINEURAL
Status: DISPENSED
Start: 2021-01-01 | End: 2021-01-01

## 2021-01-01 RX ORDER — ONDANSETRON 2 MG/ML
4 INJECTION INTRAMUSCULAR; INTRAVENOUS
Status: DISCONTINUED | OUTPATIENT
Start: 2021-01-01 | End: 2021-01-01 | Stop reason: HOSPADM

## 2021-01-01 RX ORDER — ROCURONIUM BROMIDE 10 MG/ML
INJECTION, SOLUTION INTRAVENOUS AS NEEDED
Status: DISCONTINUED | OUTPATIENT
Start: 2021-01-01 | End: 2021-01-01 | Stop reason: HOSPADM

## 2021-01-01 RX ORDER — ONDANSETRON 2 MG/ML
INJECTION INTRAMUSCULAR; INTRAVENOUS
Status: DISPENSED
Start: 2021-01-01 | End: 2021-01-01

## 2021-01-01 RX ORDER — EPHEDRINE SULFATE/0.9% NACL/PF 50 MG/5 ML
SYRINGE (ML) INTRAVENOUS AS NEEDED
Status: DISCONTINUED | OUTPATIENT
Start: 2021-01-01 | End: 2021-01-01 | Stop reason: HOSPADM

## 2021-01-01 RX ORDER — GLYCOPYRROLATE 0.2 MG/ML
0.2 INJECTION INTRAMUSCULAR; INTRAVENOUS EVERY 4 HOURS
Status: DISCONTINUED | OUTPATIENT
Start: 2021-01-01 | End: 2021-05-02 | Stop reason: HOSPADM

## 2021-01-01 RX ORDER — HYDROMORPHONE HYDROCHLORIDE 1 MG/ML
0.5 INJECTION, SOLUTION INTRAMUSCULAR; INTRAVENOUS; SUBCUTANEOUS
Status: DISPENSED | OUTPATIENT
Start: 2021-01-01 | End: 2021-01-01

## 2021-01-01 RX ORDER — ACETAMINOPHEN 650 MG/1
650 SUPPOSITORY RECTAL
Status: DISCONTINUED | OUTPATIENT
Start: 2021-01-01 | End: 2021-01-01 | Stop reason: HOSPADM

## 2021-01-01 RX ORDER — LIDOCAINE HYDROCHLORIDE 30 MG/G
CREAM TOPICAL 2 TIMES DAILY
Qty: 85 G | Refills: 0 | Status: SHIPPED | OUTPATIENT
Start: 2021-01-01

## 2021-01-01 RX ORDER — LIDOCAINE HYDROCHLORIDE 10 MG/ML
INJECTION INFILTRATION; PERINEURAL AS NEEDED
Status: DISCONTINUED | OUTPATIENT
Start: 2021-01-01 | End: 2021-01-01 | Stop reason: HOSPADM

## 2021-01-01 RX ORDER — ONDANSETRON 2 MG/ML
INJECTION INTRAMUSCULAR; INTRAVENOUS
Status: COMPLETED
Start: 2021-01-01 | End: 2021-01-01

## 2021-01-01 RX ORDER — MORPHINE SULFATE 2 MG/ML
2 INJECTION, SOLUTION INTRAMUSCULAR; INTRAVENOUS
Status: DISCONTINUED | OUTPATIENT
Start: 2021-01-01 | End: 2021-01-01

## 2021-01-01 RX ORDER — PROPOFOL 10 MG/ML
INJECTION, EMULSION INTRAVENOUS
Status: COMPLETED
Start: 2021-01-01 | End: 2021-01-01

## 2021-01-01 RX ORDER — FENTANYL CITRATE 50 UG/ML
INJECTION, SOLUTION INTRAMUSCULAR; INTRAVENOUS
Status: COMPLETED
Start: 2021-01-01 | End: 2021-01-01

## 2021-01-01 RX ORDER — GLYCOPYRROLATE 0.2 MG/ML
INJECTION INTRAMUSCULAR; INTRAVENOUS AS NEEDED
Status: DISCONTINUED | OUTPATIENT
Start: 2021-01-01 | End: 2021-01-01 | Stop reason: HOSPADM

## 2021-01-01 RX ORDER — ACETAMINOPHEN 325 MG/1
650 TABLET ORAL
Status: DISCONTINUED | OUTPATIENT
Start: 2021-01-01 | End: 2021-01-01 | Stop reason: HOSPADM

## 2021-01-01 RX ORDER — DEXTROSE, SODIUM CHLORIDE, AND POTASSIUM CHLORIDE 5; .45; .3 G/100ML; G/100ML; G/100ML
INJECTION INTRAVENOUS CONTINUOUS
Status: DISPENSED | OUTPATIENT
Start: 2021-01-01 | End: 2021-01-01

## 2021-01-01 RX ORDER — DEXAMETHASONE SODIUM PHOSPHATE 4 MG/ML
INJECTION, SOLUTION INTRA-ARTICULAR; INTRALESIONAL; INTRAMUSCULAR; INTRAVENOUS; SOFT TISSUE AS NEEDED
Status: DISCONTINUED | OUTPATIENT
Start: 2021-01-01 | End: 2021-01-01 | Stop reason: HOSPADM

## 2021-01-01 RX ORDER — HYDROMORPHONE HYDROCHLORIDE 1 MG/ML
0.2 INJECTION, SOLUTION INTRAMUSCULAR; INTRAVENOUS; SUBCUTANEOUS
Status: DISPENSED | OUTPATIENT
Start: 2021-01-01 | End: 2021-01-01

## 2021-01-01 RX ORDER — OXYCODONE HYDROCHLORIDE 5 MG/1
5 TABLET ORAL
Qty: 12 TAB | Refills: 0 | Status: SHIPPED | OUTPATIENT
Start: 2021-01-01 | End: 2021-01-01

## 2021-01-01 RX ORDER — TIZANIDINE 2 MG/1
TABLET ORAL
Qty: 60 TAB | Refills: 3 | Status: SHIPPED | OUTPATIENT
Start: 2021-01-01

## 2021-01-01 RX ORDER — IPRATROPIUM BROMIDE AND ALBUTEROL SULFATE 2.5; .5 MG/3ML; MG/3ML
3 SOLUTION RESPIRATORY (INHALATION)
Status: DISCONTINUED | OUTPATIENT
Start: 2021-01-01 | End: 2021-01-01 | Stop reason: HOSPADM

## 2021-01-01 RX ORDER — OXYCODONE HYDROCHLORIDE 5 MG/1
10 TABLET ORAL
Qty: 12 TAB | Refills: 0 | Status: SHIPPED | OUTPATIENT
Start: 2021-01-01 | End: 2021-01-01

## 2021-01-01 RX ORDER — OXYCODONE HYDROCHLORIDE 10 MG/1
10 TABLET ORAL
Qty: 9 TAB | Refills: 0 | Status: SHIPPED | OUTPATIENT
Start: 2021-01-01 | End: 2021-01-01

## 2021-01-01 RX ORDER — FENTANYL CITRATE 50 UG/ML
INJECTION, SOLUTION INTRAMUSCULAR; INTRAVENOUS AS NEEDED
Status: DISCONTINUED | OUTPATIENT
Start: 2021-01-01 | End: 2021-01-01 | Stop reason: HOSPADM

## 2021-01-01 RX ORDER — DEXAMETHASONE SODIUM PHOSPHATE 4 MG/ML
INJECTION, SOLUTION INTRA-ARTICULAR; INTRALESIONAL; INTRAMUSCULAR; INTRAVENOUS; SOFT TISSUE
Status: COMPLETED
Start: 2021-01-01 | End: 2021-01-01

## 2021-01-01 RX ORDER — HYDROMORPHONE HCL IN 0.9% NACL 15 MG/30ML
PATIENT CONTROLLED ANALGESIA VIAL INTRAVENOUS CONTINUOUS
Status: DISCONTINUED | OUTPATIENT
Start: 2021-01-01 | End: 2021-05-02 | Stop reason: HOSPADM

## 2021-01-01 RX ORDER — SODIUM CHLORIDE, SODIUM LACTATE, POTASSIUM CHLORIDE, CALCIUM CHLORIDE 600; 310; 30; 20 MG/100ML; MG/100ML; MG/100ML; MG/100ML
INJECTION, SOLUTION INTRAVENOUS
Status: DISCONTINUED | OUTPATIENT
Start: 2021-01-01 | End: 2021-01-01 | Stop reason: HOSPADM

## 2021-01-01 RX ORDER — SODIUM CHLORIDE 9 MG/ML
50 INJECTION, SOLUTION INTRAVENOUS CONTINUOUS
Status: DISCONTINUED | OUTPATIENT
Start: 2021-01-01 | End: 2021-01-01

## 2021-01-01 RX ORDER — SODIUM CHLORIDE 0.9 % (FLUSH) 0.9 %
5-10 SYRINGE (ML) INJECTION EVERY 8 HOURS
Status: DISCONTINUED | OUTPATIENT
Start: 2021-01-01 | End: 2021-05-02 | Stop reason: HOSPADM

## 2021-01-01 RX ORDER — KETOROLAC TROMETHAMINE 30 MG/ML
30 INJECTION, SOLUTION INTRAMUSCULAR; INTRAVENOUS
Status: ACTIVE | OUTPATIENT
Start: 2021-01-01 | End: 2021-01-01

## 2021-01-01 RX ORDER — NYSTATIN 100000 [USP'U]/ML
500000 SUSPENSION ORAL 4 TIMES DAILY
Qty: 473 ML | Refills: 0 | Status: SHIPPED | OUTPATIENT
Start: 2021-01-01

## 2021-01-01 RX ORDER — POTASSIUM CHLORIDE 1.5 G/1.77G
40 POWDER, FOR SOLUTION ORAL
Status: COMPLETED | OUTPATIENT
Start: 2021-01-01 | End: 2021-01-01

## 2021-01-01 RX ORDER — SODIUM CHLORIDE 0.9 % (FLUSH) 0.9 %
5-40 SYRINGE (ML) INJECTION AS NEEDED
Status: CANCELLED | OUTPATIENT
Start: 2021-01-01

## 2021-01-01 RX ORDER — GLYCOPYRROLATE 0.2 MG/ML
0.2 INJECTION INTRAMUSCULAR; INTRAVENOUS
Status: DISCONTINUED | OUTPATIENT
Start: 2021-01-01 | End: 2021-01-01

## 2021-01-01 RX ORDER — LANOLIN ALCOHOL/MO/W.PET/CERES
6 CREAM (GRAM) TOPICAL
Qty: 30 TAB | Refills: 0 | Status: SHIPPED | OUTPATIENT
Start: 2021-01-01

## 2021-01-01 RX ORDER — SODIUM CHLORIDE 9 MG/ML
75 INJECTION, SOLUTION INTRAVENOUS CONTINUOUS
Status: CANCELLED | OUTPATIENT
Start: 2021-01-01

## 2021-01-01 RX ORDER — MORPHINE SULFATE 4 MG/ML
4 INJECTION INTRAVENOUS
Status: DISCONTINUED | OUTPATIENT
Start: 2021-01-01 | End: 2021-01-01

## 2021-01-01 RX ORDER — MINERAL OIL
OIL (ML) MISCELLANEOUS AS NEEDED
Status: DISCONTINUED | OUTPATIENT
Start: 2021-01-01 | End: 2021-01-01 | Stop reason: HOSPADM

## 2021-01-01 RX ORDER — ONDANSETRON 2 MG/ML
INJECTION INTRAMUSCULAR; INTRAVENOUS AS NEEDED
Status: DISCONTINUED | OUTPATIENT
Start: 2021-01-01 | End: 2021-01-01 | Stop reason: HOSPADM

## 2021-01-01 RX ORDER — OXYCODONE HYDROCHLORIDE 10 MG/1
10 TABLET ORAL
Qty: 42 TAB | Refills: 0 | Status: SHIPPED | OUTPATIENT
Start: 2021-01-01 | End: 2021-01-01

## 2021-01-01 RX ORDER — LEVOFLOXACIN 500 MG/1
500 TABLET, FILM COATED ORAL DAILY
Qty: 5 TAB | Refills: 0 | Status: SHIPPED | OUTPATIENT
Start: 2021-01-01

## 2021-01-01 RX ORDER — METOPROLOL TARTRATE 5 MG/5ML
INJECTION INTRAVENOUS
Status: DISPENSED
Start: 2021-01-01 | End: 2021-01-01

## 2021-01-01 RX ORDER — SODIUM CHLORIDE 0.9 % (FLUSH) 0.9 %
5-40 SYRINGE (ML) INJECTION AS NEEDED
Status: DISCONTINUED | OUTPATIENT
Start: 2021-01-01 | End: 2021-01-01 | Stop reason: HOSPADM

## 2021-01-01 RX ORDER — HYDROMORPHONE HYDROCHLORIDE 1 MG/ML
0.5 INJECTION, SOLUTION INTRAMUSCULAR; INTRAVENOUS; SUBCUTANEOUS
Status: DISCONTINUED | OUTPATIENT
Start: 2021-01-01 | End: 2021-01-01 | Stop reason: HOSPADM

## 2021-01-01 RX ORDER — HYDROMORPHONE HYDROCHLORIDE 1 MG/ML
2 INJECTION, SOLUTION INTRAMUSCULAR; INTRAVENOUS; SUBCUTANEOUS ONCE
Status: COMPLETED | OUTPATIENT
Start: 2021-01-01 | End: 2021-01-01

## 2021-01-01 RX ORDER — HYDROMORPHONE HYDROCHLORIDE 1 MG/ML
0.5 INJECTION, SOLUTION INTRAMUSCULAR; INTRAVENOUS; SUBCUTANEOUS
Status: DISCONTINUED | OUTPATIENT
Start: 2021-01-01 | End: 2021-05-02 | Stop reason: HOSPADM

## 2021-01-01 RX ORDER — NORETHINDRONE AND ETHINYL ESTRADIOL 0.5-0.035
KIT ORAL
Status: DISPENSED
Start: 2021-01-01 | End: 2021-01-01

## 2021-01-01 RX ORDER — EPINEPHRINE 0.1 MG/ML
INJECTION INTRACARDIAC; INTRAVENOUS AS NEEDED
Status: DISCONTINUED | OUTPATIENT
Start: 2021-01-01 | End: 2021-01-01 | Stop reason: HOSPADM

## 2021-01-01 RX ORDER — POTASSIUM CHLORIDE 7.45 MG/ML
10 INJECTION INTRAVENOUS
Status: DISPENSED | OUTPATIENT
Start: 2021-01-01 | End: 2021-01-01

## 2021-01-01 RX ORDER — LIDOCAINE HYDROCHLORIDE 20 MG/ML
INJECTION, SOLUTION EPIDURAL; INFILTRATION; INTRACAUDAL; PERINEURAL AS NEEDED
Status: DISCONTINUED | OUTPATIENT
Start: 2021-01-01 | End: 2021-01-01 | Stop reason: HOSPADM

## 2021-01-01 RX ORDER — LANOLIN ALCOHOL/MO/W.PET/CERES
6 CREAM (GRAM) TOPICAL
Status: DISCONTINUED | OUTPATIENT
Start: 2021-01-01 | End: 2021-01-01 | Stop reason: HOSPADM

## 2021-01-01 RX ORDER — PROMETHAZINE HYDROCHLORIDE 25 MG/1
12.5 TABLET ORAL
Status: DISCONTINUED | OUTPATIENT
Start: 2021-01-01 | End: 2021-01-01 | Stop reason: HOSPADM

## 2021-01-01 RX ORDER — POTASSIUM CHLORIDE 7.45 MG/ML
10 INJECTION INTRAVENOUS
Status: COMPLETED | OUTPATIENT
Start: 2021-01-01 | End: 2021-01-01

## 2021-01-01 RX ORDER — NEOSTIGMINE METHYLSULFATE 1 MG/ML
INJECTION INTRAVENOUS AS NEEDED
Status: DISCONTINUED | OUTPATIENT
Start: 2021-01-01 | End: 2021-01-01 | Stop reason: HOSPADM

## 2021-01-01 RX ORDER — CYCLOBENZAPRINE HCL 10 MG
10 TABLET ORAL 3 TIMES DAILY
Status: CANCELLED | OUTPATIENT
Start: 2021-01-01

## 2021-01-01 RX ORDER — DEXAMETHASONE SODIUM PHOSPHATE 4 MG/ML
INJECTION, SOLUTION INTRA-ARTICULAR; INTRALESIONAL; INTRAMUSCULAR; INTRAVENOUS; SOFT TISSUE
Status: DISPENSED
Start: 2021-01-01 | End: 2021-01-01

## 2021-01-01 RX ORDER — CYCLOBENZAPRINE HCL 10 MG
10 TABLET ORAL 3 TIMES DAILY
Status: DISCONTINUED | OUTPATIENT
Start: 2021-01-01 | End: 2021-01-01 | Stop reason: HOSPADM

## 2021-01-01 RX ORDER — NYSTATIN 100000 [USP'U]/ML
500000 SUSPENSION ORAL 4 TIMES DAILY
Status: DISCONTINUED | OUTPATIENT
Start: 2021-01-01 | End: 2021-01-01 | Stop reason: HOSPADM

## 2021-01-01 RX ORDER — PROPOFOL 10 MG/ML
INJECTION, EMULSION INTRAVENOUS
Status: DISPENSED
Start: 2021-01-01 | End: 2021-01-01

## 2021-01-01 RX ORDER — PROPOFOL 10 MG/ML
INJECTION, EMULSION INTRAVENOUS AS NEEDED
Status: DISCONTINUED | OUTPATIENT
Start: 2021-01-01 | End: 2021-01-01 | Stop reason: HOSPADM

## 2021-01-01 RX ORDER — HYDROMORPHONE HYDROCHLORIDE 1 MG/ML
0.5 INJECTION, SOLUTION INTRAMUSCULAR; INTRAVENOUS; SUBCUTANEOUS ONCE
Status: COMPLETED | OUTPATIENT
Start: 2021-01-01 | End: 2021-01-01

## 2021-01-01 RX ORDER — LIDOCAINE 40 MG/G
CREAM TOPICAL 2 TIMES DAILY
Status: DISCONTINUED | OUTPATIENT
Start: 2021-01-01 | End: 2021-01-01 | Stop reason: HOSPADM

## 2021-01-01 RX ORDER — POLYETHYLENE GLYCOL 3350 17 G/17G
17 POWDER, FOR SOLUTION ORAL DAILY PRN
Status: DISCONTINUED | OUTPATIENT
Start: 2021-01-01 | End: 2021-01-01

## 2021-01-01 RX ORDER — HYDROMORPHONE HYDROCHLORIDE 1 MG/ML
INJECTION, SOLUTION INTRAMUSCULAR; INTRAVENOUS; SUBCUTANEOUS
Status: CANCELLED | OUTPATIENT
Start: 2021-01-01

## 2021-01-01 RX ORDER — POTASSIUM CHLORIDE 7.45 MG/ML
10 INJECTION INTRAVENOUS
Status: DISCONTINUED | OUTPATIENT
Start: 2021-01-01 | End: 2021-01-01

## 2021-01-01 RX ORDER — POTASSIUM CHLORIDE 20 MEQ/1
60 TABLET, EXTENDED RELEASE ORAL
Status: COMPLETED | OUTPATIENT
Start: 2021-01-01 | End: 2021-01-01

## 2021-01-01 RX ORDER — POLYETHYLENE GLYCOL 3350 17 G/17G
17 POWDER, FOR SOLUTION ORAL DAILY
Status: DISCONTINUED | OUTPATIENT
Start: 2021-01-01 | End: 2021-01-01 | Stop reason: HOSPADM

## 2021-01-01 RX ORDER — ACETAMINOPHEN 325 MG/1
650 TABLET ORAL
Qty: 60 TAB | Refills: 0 | Status: SHIPPED | OUTPATIENT
Start: 2021-01-01

## 2021-01-01 RX ORDER — SODIUM CHLORIDE 0.9 % (FLUSH) 0.9 %
5-40 SYRINGE (ML) INJECTION EVERY 8 HOURS
Status: DISCONTINUED | OUTPATIENT
Start: 2021-01-01 | End: 2021-01-01 | Stop reason: HOSPADM

## 2021-01-01 RX ORDER — SODIUM CHLORIDE 0.9 % (FLUSH) 0.9 %
5-40 SYRINGE (ML) INJECTION EVERY 8 HOURS
Status: CANCELLED | OUTPATIENT
Start: 2021-01-01

## 2021-01-01 RX ORDER — POTASSIUM CHLORIDE 20 MEQ/1
40 TABLET, EXTENDED RELEASE ORAL
Status: COMPLETED | OUTPATIENT
Start: 2021-01-01 | End: 2021-01-01

## 2021-01-01 RX ORDER — LORAZEPAM 2 MG/ML
0.5 INJECTION INTRAMUSCULAR
Status: DISCONTINUED | OUTPATIENT
Start: 2021-01-01 | End: 2021-05-02 | Stop reason: HOSPADM

## 2021-01-01 RX ADMIN — CYCLOBENZAPRINE 10 MG: 10 TABLET, FILM COATED ORAL at 09:00

## 2021-01-01 RX ADMIN — HYDROMORPHONE HYDROCHLORIDE 0.5 MG: 1 INJECTION, SOLUTION INTRAMUSCULAR; INTRAVENOUS; SUBCUTANEOUS at 00:53

## 2021-01-01 RX ADMIN — POTASSIUM CHLORIDE 20 MEQ: 1500 TABLET, EXTENDED RELEASE ORAL at 14:08

## 2021-01-01 RX ADMIN — LIDOCAINE 4%: 4 CREAM TOPICAL at 21:11

## 2021-01-01 RX ADMIN — Medication: at 18:12

## 2021-01-01 RX ADMIN — HYDROMORPHONE HYDROCHLORIDE 0.5 MG: 1 INJECTION, SOLUTION INTRAMUSCULAR; INTRAVENOUS; SUBCUTANEOUS at 10:18

## 2021-01-01 RX ADMIN — HYDROMORPHONE HYDROCHLORIDE 0.2 MG: 1 INJECTION, SOLUTION INTRAMUSCULAR; INTRAVENOUS; SUBCUTANEOUS at 17:06

## 2021-01-01 RX ADMIN — CYCLOBENZAPRINE 10 MG: 10 TABLET, FILM COATED ORAL at 16:00

## 2021-01-01 RX ADMIN — GLYCOPYRROLATE 0.2 MG: 0.2 INJECTION, SOLUTION INTRAMUSCULAR; INTRAVENOUS at 04:24

## 2021-01-01 RX ADMIN — MULTIPLE VITAMINS W/ MINERALS TAB 1 TABLET: TAB at 08:51

## 2021-01-01 RX ADMIN — GLYCOPYRROLATE 0.2 MG: 0.2 INJECTION, SOLUTION INTRAMUSCULAR; INTRAVENOUS at 13:14

## 2021-01-01 RX ADMIN — CYCLOBENZAPRINE 10 MG: 10 TABLET, FILM COATED ORAL at 08:37

## 2021-01-01 RX ADMIN — HYDROMORPHONE HYDROCHLORIDE 0.5 MG: 1 INJECTION, SOLUTION INTRAMUSCULAR; INTRAVENOUS; SUBCUTANEOUS at 05:15

## 2021-01-01 RX ADMIN — HYDROMORPHONE HYDROCHLORIDE 0.5 MG: 1 INJECTION, SOLUTION INTRAMUSCULAR; INTRAVENOUS; SUBCUTANEOUS at 22:07

## 2021-01-01 RX ADMIN — NYSTATIN 500000 UNITS: 100000 SUSPENSION ORAL at 21:08

## 2021-01-01 RX ADMIN — POTASSIUM CHLORIDE 10 MEQ: 7.46 INJECTION, SOLUTION INTRAVENOUS at 09:43

## 2021-01-01 RX ADMIN — HYDROMORPHONE HYDROCHLORIDE 0.5 MG: 1 INJECTION, SOLUTION INTRAMUSCULAR; INTRAVENOUS; SUBCUTANEOUS at 06:33

## 2021-01-01 RX ADMIN — HYDROMORPHONE HYDROCHLORIDE 0.5 MG: 1 INJECTION, SOLUTION INTRAMUSCULAR; INTRAVENOUS; SUBCUTANEOUS at 01:55

## 2021-01-01 RX ADMIN — LIDOCAINE 4%: 4 CREAM TOPICAL at 11:35

## 2021-01-01 RX ADMIN — PIPERACILLIN AND TAZOBACTAM 3.38 G: 3; .375 INJECTION, POWDER, LYOPHILIZED, FOR SOLUTION INTRAVENOUS at 21:00

## 2021-01-01 RX ADMIN — SODIUM PHOSPHATE 66 ML: 7; 19 ENEMA RECTAL at 20:27

## 2021-01-01 RX ADMIN — MELATONIN TAB 3 MG 6 MG: 3 TAB at 22:00

## 2021-01-01 RX ADMIN — CYCLOBENZAPRINE 10 MG: 10 TABLET, FILM COATED ORAL at 09:15

## 2021-01-01 RX ADMIN — GLYCOPYRROLATE 0.2 MG: 0.2 INJECTION, SOLUTION INTRAMUSCULAR; INTRAVENOUS at 12:19

## 2021-01-01 RX ADMIN — HYDROMORPHONE HYDROCHLORIDE 0.5 MG: 1 INJECTION, SOLUTION INTRAMUSCULAR; INTRAVENOUS; SUBCUTANEOUS at 17:36

## 2021-01-01 RX ADMIN — PIPERACILLIN AND TAZOBACTAM 3.38 G: 3; .375 INJECTION, POWDER, LYOPHILIZED, FOR SOLUTION INTRAVENOUS at 20:17

## 2021-01-01 RX ADMIN — Medication 5 ML: at 14:00

## 2021-01-01 RX ADMIN — NYSTATIN 500000 UNITS: 100000 SUSPENSION ORAL at 09:15

## 2021-01-01 RX ADMIN — POTASSIUM CHLORIDE, DEXTROSE MONOHYDRATE AND SODIUM CHLORIDE: 300; 5; 450 INJECTION, SOLUTION INTRAVENOUS at 16:00

## 2021-01-01 RX ADMIN — MELATONIN TAB 3 MG 6 MG: 3 TAB at 21:11

## 2021-01-01 RX ADMIN — LIDOCAINE 4%: 4 CREAM TOPICAL at 21:01

## 2021-01-01 RX ADMIN — NYSTATIN 500000 UNITS: 100000 SUSPENSION ORAL at 09:33

## 2021-01-01 RX ADMIN — HYDROMORPHONE HYDROCHLORIDE 0.5 MG: 1 INJECTION, SOLUTION INTRAMUSCULAR; INTRAVENOUS; SUBCUTANEOUS at 21:09

## 2021-01-01 RX ADMIN — NYSTATIN 500000 UNITS: 100000 SUSPENSION ORAL at 22:06

## 2021-01-01 RX ADMIN — HYDROMORPHONE HYDROCHLORIDE 0.5 MG: 1 INJECTION, SOLUTION INTRAMUSCULAR; INTRAVENOUS; SUBCUTANEOUS at 00:24

## 2021-01-01 RX ADMIN — LIDOCAINE 4%: 4 CREAM TOPICAL at 08:40

## 2021-01-01 RX ADMIN — Medication 10 ML: at 21:44

## 2021-01-01 RX ADMIN — NYSTATIN 500000 UNITS: 100000 SUSPENSION ORAL at 21:11

## 2021-01-01 RX ADMIN — Medication 10 ML: at 13:58

## 2021-01-01 RX ADMIN — POTASSIUM CHLORIDE 10 MEQ: 7.46 INJECTION, SOLUTION INTRAVENOUS at 14:35

## 2021-01-01 RX ADMIN — PIPERACILLIN AND TAZOBACTAM 3.38 G: 3; .375 INJECTION, POWDER, LYOPHILIZED, FOR SOLUTION INTRAVENOUS at 20:03

## 2021-01-01 RX ADMIN — PIPERACILLIN AND TAZOBACTAM 3.38 G: 3; .375 INJECTION, POWDER, LYOPHILIZED, FOR SOLUTION INTRAVENOUS at 05:30

## 2021-01-01 RX ADMIN — HYDROMORPHONE HYDROCHLORIDE 0.5 MG: 1 INJECTION, SOLUTION INTRAMUSCULAR; INTRAVENOUS; SUBCUTANEOUS at 12:19

## 2021-01-01 RX ADMIN — MULTIPLE VITAMINS W/ MINERALS TAB 1 TABLET: TAB at 08:01

## 2021-01-01 RX ADMIN — GLYCOPYRROLATE 0.2 MG: 0.2 INJECTION, SOLUTION INTRAMUSCULAR; INTRAVENOUS at 15:58

## 2021-01-01 RX ADMIN — SODIUM CHLORIDE 50 ML/HR: 9 INJECTION, SOLUTION INTRAVENOUS at 04:51

## 2021-01-01 RX ADMIN — NYSTATIN 500000 UNITS: 100000 SUSPENSION ORAL at 08:46

## 2021-01-01 RX ADMIN — LORAZEPAM 0.5 MG: 2 INJECTION INTRAMUSCULAR; INTRAVENOUS at 20:34

## 2021-01-01 RX ADMIN — Medication 10 ML: at 14:00

## 2021-01-01 RX ADMIN — POTASSIUM CHLORIDE 10 MEQ: 7.46 INJECTION, SOLUTION INTRAVENOUS at 13:29

## 2021-01-01 RX ADMIN — HYDROMORPHONE HYDROCHLORIDE 0.5 MG: 1 INJECTION, SOLUTION INTRAMUSCULAR; INTRAVENOUS; SUBCUTANEOUS at 20:35

## 2021-01-01 RX ADMIN — HYDROMORPHONE HYDROCHLORIDE 0.5 MG: 1 INJECTION, SOLUTION INTRAMUSCULAR; INTRAVENOUS; SUBCUTANEOUS at 09:43

## 2021-01-01 RX ADMIN — HYDROMORPHONE HYDROCHLORIDE 0.5 MG: 1 INJECTION, SOLUTION INTRAMUSCULAR; INTRAVENOUS; SUBCUTANEOUS at 16:26

## 2021-01-01 RX ADMIN — ROCURONIUM BROMIDE 30 MG: 10 SOLUTION INTRAVENOUS at 09:40

## 2021-01-01 RX ADMIN — CYCLOBENZAPRINE 10 MG: 10 TABLET, FILM COATED ORAL at 15:52

## 2021-01-01 RX ADMIN — Medication 10 ML: at 05:32

## 2021-01-01 RX ADMIN — PIPERACILLIN AND TAZOBACTAM 3.38 G: 3; .375 INJECTION, POWDER, LYOPHILIZED, FOR SOLUTION INTRAVENOUS at 18:54

## 2021-01-01 RX ADMIN — HYDROMORPHONE HYDROCHLORIDE 0.5 MG: 1 INJECTION, SOLUTION INTRAMUSCULAR; INTRAVENOUS; SUBCUTANEOUS at 12:07

## 2021-01-01 RX ADMIN — HYDROMORPHONE HYDROCHLORIDE 0.5 MG: 1 INJECTION, SOLUTION INTRAMUSCULAR; INTRAVENOUS; SUBCUTANEOUS at 03:24

## 2021-01-01 RX ADMIN — Medication: at 18:27

## 2021-01-01 RX ADMIN — LIDOCAINE HYDROCHLORIDE 60 MG: 20 INJECTION, SOLUTION EPIDURAL; INFILTRATION; INTRACAUDAL; PERINEURAL at 13:18

## 2021-01-01 RX ADMIN — HYDROMORPHONE HYDROCHLORIDE 0.5 MG: 1 INJECTION, SOLUTION INTRAMUSCULAR; INTRAVENOUS; SUBCUTANEOUS at 06:25

## 2021-01-01 RX ADMIN — Medication 10 ML: at 17:08

## 2021-01-01 RX ADMIN — LORAZEPAM 0.5 MG: 2 INJECTION INTRAMUSCULAR; INTRAVENOUS at 12:19

## 2021-01-01 RX ADMIN — PIPERACILLIN AND TAZOBACTAM 3.38 G: 3; .375 INJECTION, POWDER, LYOPHILIZED, FOR SOLUTION INTRAVENOUS at 03:39

## 2021-01-01 RX ADMIN — HYDROMORPHONE HYDROCHLORIDE 0.5 MG: 1 INJECTION, SOLUTION INTRAMUSCULAR; INTRAVENOUS; SUBCUTANEOUS at 13:56

## 2021-01-01 RX ADMIN — Medication 10 ML: at 07:02

## 2021-01-01 RX ADMIN — HYDROMORPHONE HYDROCHLORIDE 0.5 MG: 1 INJECTION, SOLUTION INTRAMUSCULAR; INTRAVENOUS; SUBCUTANEOUS at 17:24

## 2021-01-01 RX ADMIN — HYDROMORPHONE HYDROCHLORIDE 0.5 MG: 1 INJECTION, SOLUTION INTRAMUSCULAR; INTRAVENOUS; SUBCUTANEOUS at 23:49

## 2021-01-01 RX ADMIN — MELATONIN TAB 3 MG 6 MG: 3 TAB at 21:27

## 2021-01-01 RX ADMIN — HYDROMORPHONE HYDROCHLORIDE 0.5 MG: 1 INJECTION, SOLUTION INTRAMUSCULAR; INTRAVENOUS; SUBCUTANEOUS at 03:53

## 2021-01-01 RX ADMIN — POTASSIUM CHLORIDE 10 MEQ: 7.46 INJECTION, SOLUTION INTRAVENOUS at 16:07

## 2021-01-01 RX ADMIN — CYCLOBENZAPRINE 10 MG: 10 TABLET, FILM COATED ORAL at 17:01

## 2021-01-01 RX ADMIN — Medication 10 ML: at 16:00

## 2021-01-01 RX ADMIN — NYSTATIN 500000 UNITS: 100000 SUSPENSION ORAL at 22:44

## 2021-01-01 RX ADMIN — HYDROMORPHONE HYDROCHLORIDE 0.5 MG: 1 INJECTION, SOLUTION INTRAMUSCULAR; INTRAVENOUS; SUBCUTANEOUS at 04:24

## 2021-01-01 RX ADMIN — HYDROMORPHONE HYDROCHLORIDE 0.5 MG: 1 INJECTION, SOLUTION INTRAMUSCULAR; INTRAVENOUS; SUBCUTANEOUS at 02:53

## 2021-01-01 RX ADMIN — POTASSIUM CHLORIDE 10 MEQ: 7.46 INJECTION, SOLUTION INTRAVENOUS at 13:58

## 2021-01-01 RX ADMIN — Medication 10 ML: at 14:10

## 2021-01-01 RX ADMIN — HYDROMORPHONE HYDROCHLORIDE 0.5 MG: 1 INJECTION, SOLUTION INTRAMUSCULAR; INTRAVENOUS; SUBCUTANEOUS at 08:49

## 2021-01-01 RX ADMIN — SODIUM CHLORIDE, POTASSIUM CHLORIDE, SODIUM LACTATE AND CALCIUM CHLORIDE: 600; 310; 30; 20 INJECTION, SOLUTION INTRAVENOUS at 13:18

## 2021-01-01 RX ADMIN — HYDROMORPHONE HYDROCHLORIDE 2 MG: 1 INJECTION, SOLUTION INTRAMUSCULAR; INTRAVENOUS; SUBCUTANEOUS at 16:22

## 2021-01-01 RX ADMIN — POTASSIUM CHLORIDE 40 MEQ: 1.5 FOR SOLUTION ORAL at 15:00

## 2021-01-01 RX ADMIN — POTASSIUM CHLORIDE 10 MEQ: 7.46 INJECTION, SOLUTION INTRAVENOUS at 08:55

## 2021-01-01 RX ADMIN — HYDROMORPHONE HYDROCHLORIDE 0.2 MG: 1 INJECTION, SOLUTION INTRAMUSCULAR; INTRAVENOUS; SUBCUTANEOUS at 08:55

## 2021-01-01 RX ADMIN — HYDROMORPHONE HYDROCHLORIDE 0.2 MG: 1 INJECTION, SOLUTION INTRAMUSCULAR; INTRAVENOUS; SUBCUTANEOUS at 03:31

## 2021-01-01 RX ADMIN — POTASSIUM CHLORIDE 40 MEQ: 1500 TABLET, EXTENDED RELEASE ORAL at 14:34

## 2021-01-01 RX ADMIN — CYCLOBENZAPRINE 10 MG: 10 TABLET, FILM COATED ORAL at 22:06

## 2021-01-01 RX ADMIN — HYDROMORPHONE HYDROCHLORIDE 0.5 MG: 1 INJECTION, SOLUTION INTRAMUSCULAR; INTRAVENOUS; SUBCUTANEOUS at 18:15

## 2021-01-01 RX ADMIN — HYDROMORPHONE HYDROCHLORIDE 0.5 MG: 1 INJECTION, SOLUTION INTRAMUSCULAR; INTRAVENOUS; SUBCUTANEOUS at 10:37

## 2021-01-01 RX ADMIN — LIDOCAINE 4%: 4 CREAM TOPICAL at 22:54

## 2021-01-01 RX ADMIN — PIPERACILLIN AND TAZOBACTAM 3.38 G: 3; .375 INJECTION, POWDER, LYOPHILIZED, FOR SOLUTION INTRAVENOUS at 05:31

## 2021-01-01 RX ADMIN — CYCLOBENZAPRINE 10 MG: 10 TABLET, FILM COATED ORAL at 09:33

## 2021-01-01 RX ADMIN — Medication 10 ML: at 08:46

## 2021-01-01 RX ADMIN — Medication 10 ML: at 21:46

## 2021-01-01 RX ADMIN — NYSTATIN 500000 UNITS: 100000 SUSPENSION ORAL at 13:56

## 2021-01-01 RX ADMIN — NYSTATIN 500000 UNITS: 100000 SUSPENSION ORAL at 21:28

## 2021-01-01 RX ADMIN — PIPERACILLIN AND TAZOBACTAM 3.38 G: 3; .375 INJECTION, POWDER, LYOPHILIZED, FOR SOLUTION INTRAVENOUS at 13:56

## 2021-01-01 RX ADMIN — CYCLOBENZAPRINE 10 MG: 10 TABLET, FILM COATED ORAL at 23:19

## 2021-01-01 RX ADMIN — IOPAMIDOL 100 ML: 755 INJECTION, SOLUTION INTRAVENOUS at 17:12

## 2021-01-01 RX ADMIN — PIPERACILLIN AND TAZOBACTAM 3.38 G: 3; .375 INJECTION, POWDER, LYOPHILIZED, FOR SOLUTION INTRAVENOUS at 11:35

## 2021-01-01 RX ADMIN — LORAZEPAM 0.5 MG: 2 INJECTION INTRAMUSCULAR; INTRAVENOUS at 06:33

## 2021-01-01 RX ADMIN — CYCLOBENZAPRINE 10 MG: 10 TABLET, FILM COATED ORAL at 21:11

## 2021-01-01 RX ADMIN — CYCLOBENZAPRINE 10 MG: 10 TABLET, FILM COATED ORAL at 21:27

## 2021-01-01 RX ADMIN — NYSTATIN 500000 UNITS: 100000 SUSPENSION ORAL at 08:55

## 2021-01-01 RX ADMIN — POTASSIUM CHLORIDE 10 MEQ: 7.46 INJECTION, SOLUTION INTRAVENOUS at 12:07

## 2021-01-01 RX ADMIN — Medication 10 ML: at 22:00

## 2021-01-01 RX ADMIN — Medication 10 ML: at 05:33

## 2021-01-01 RX ADMIN — NYSTATIN 500000 UNITS: 100000 SUSPENSION ORAL at 17:26

## 2021-01-01 RX ADMIN — PIPERACILLIN AND TAZOBACTAM 3.38 G: 3; .375 INJECTION, POWDER, LYOPHILIZED, FOR SOLUTION INTRAVENOUS at 11:04

## 2021-01-01 RX ADMIN — HYDROMORPHONE HYDROCHLORIDE 0.5 MG: 1 INJECTION, SOLUTION INTRAMUSCULAR; INTRAVENOUS; SUBCUTANEOUS at 14:10

## 2021-01-01 RX ADMIN — MULTIPLE VITAMINS W/ MINERALS TAB 1 TABLET: TAB at 08:46

## 2021-01-01 RX ADMIN — SODIUM CHLORIDE 50 ML/HR: 9 INJECTION, SOLUTION INTRAVENOUS at 21:31

## 2021-01-01 RX ADMIN — Medication 10 ML: at 06:00

## 2021-01-01 RX ADMIN — NYSTATIN 500000 UNITS: 100000 SUSPENSION ORAL at 08:47

## 2021-01-01 RX ADMIN — PIPERACILLIN AND TAZOBACTAM 3.38 G: 3; .375 INJECTION, POWDER, LYOPHILIZED, FOR SOLUTION INTRAVENOUS at 03:27

## 2021-01-01 RX ADMIN — NYSTATIN 500000 UNITS: 100000 SUSPENSION ORAL at 08:52

## 2021-01-01 RX ADMIN — HYDROMORPHONE HYDROCHLORIDE 0.5 MG: 1 INJECTION, SOLUTION INTRAMUSCULAR; INTRAVENOUS; SUBCUTANEOUS at 17:26

## 2021-01-01 RX ADMIN — POTASSIUM CHLORIDE 10 MEQ: 7.46 INJECTION, SOLUTION INTRAVENOUS at 13:08

## 2021-01-01 RX ADMIN — Medication 10 ML: at 05:00

## 2021-01-01 RX ADMIN — NYSTATIN 500000 UNITS: 100000 SUSPENSION ORAL at 21:05

## 2021-01-01 RX ADMIN — NYSTATIN 500000 UNITS: 100000 SUSPENSION ORAL at 23:19

## 2021-01-01 RX ADMIN — NYSTATIN 500000 UNITS: 100000 SUSPENSION ORAL at 18:58

## 2021-01-01 RX ADMIN — CYCLOBENZAPRINE 10 MG: 10 TABLET, FILM COATED ORAL at 22:44

## 2021-01-01 RX ADMIN — DEXAMETHASONE SODIUM PHOSPHATE 8 MG: 4 INJECTION, SOLUTION INTRA-ARTICULAR; INTRALESIONAL; INTRAMUSCULAR; INTRAVENOUS; SOFT TISSUE at 13:19

## 2021-01-01 RX ADMIN — PIPERACILLIN AND TAZOBACTAM 3.38 G: 3; .375 INJECTION, POWDER, LYOPHILIZED, FOR SOLUTION INTRAVENOUS at 21:30

## 2021-01-01 RX ADMIN — LORAZEPAM 0.5 MG: 2 INJECTION INTRAMUSCULAR; INTRAVENOUS at 15:58

## 2021-01-01 RX ADMIN — CYCLOBENZAPRINE 10 MG: 10 TABLET, FILM COATED ORAL at 16:35

## 2021-01-01 RX ADMIN — HYDROMORPHONE HYDROCHLORIDE 0.5 MG: 1 INJECTION, SOLUTION INTRAMUSCULAR; INTRAVENOUS; SUBCUTANEOUS at 16:34

## 2021-01-01 RX ADMIN — Medication 10 ML: at 21:09

## 2021-01-01 RX ADMIN — HYDROMORPHONE HYDROCHLORIDE 0.5 MG: 1 INJECTION, SOLUTION INTRAMUSCULAR; INTRAVENOUS; SUBCUTANEOUS at 20:17

## 2021-01-01 RX ADMIN — Medication 10 ML: at 05:36

## 2021-01-01 RX ADMIN — NYSTATIN 500000 UNITS: 100000 SUSPENSION ORAL at 18:46

## 2021-01-01 RX ADMIN — CYCLOBENZAPRINE 10 MG: 10 TABLET, FILM COATED ORAL at 15:53

## 2021-01-01 RX ADMIN — GLYCOPYRROLATE 0.2 MG: 0.2 INJECTION, SOLUTION INTRAMUSCULAR; INTRAVENOUS at 17:26

## 2021-01-01 RX ADMIN — MELATONIN TAB 3 MG 6 MG: 3 TAB at 22:07

## 2021-01-01 RX ADMIN — LIDOCAINE 4%: 4 CREAM TOPICAL at 23:21

## 2021-01-01 RX ADMIN — NYSTATIN 500000 UNITS: 100000 SUSPENSION ORAL at 19:22

## 2021-01-01 RX ADMIN — LORAZEPAM 0.5 MG: 2 INJECTION INTRAMUSCULAR; INTRAVENOUS at 00:20

## 2021-01-01 RX ADMIN — LACTULOSE 15 ML: 20 SOLUTION ORAL at 11:05

## 2021-01-01 RX ADMIN — PIPERACILLIN AND TAZOBACTAM 3.38 G: 3; .375 INJECTION, POWDER, LYOPHILIZED, FOR SOLUTION INTRAVENOUS at 20:40

## 2021-01-01 RX ADMIN — PIPERACILLIN AND TAZOBACTAM 3.38 G: 3; .375 INJECTION, POWDER, LYOPHILIZED, FOR SOLUTION INTRAVENOUS at 11:06

## 2021-01-01 RX ADMIN — Medication 10 ML: at 22:08

## 2021-01-01 RX ADMIN — POTASSIUM CHLORIDE 10 MEQ: 7.46 INJECTION, SOLUTION INTRAVENOUS at 17:51

## 2021-01-01 RX ADMIN — GLYCOPYRROLATE 0.2 MG: 0.2 INJECTION, SOLUTION INTRAMUSCULAR; INTRAVENOUS at 09:07

## 2021-01-01 RX ADMIN — MULTIPLE VITAMINS W/ MINERALS TAB 1 TABLET: TAB at 09:00

## 2021-01-01 RX ADMIN — HYDROMORPHONE HYDROCHLORIDE 0.5 MG: 1 INJECTION, SOLUTION INTRAMUSCULAR; INTRAVENOUS; SUBCUTANEOUS at 08:45

## 2021-01-01 RX ADMIN — PROPOFOL 100 MG: 10 INJECTION, EMULSION INTRAVENOUS at 09:39

## 2021-01-01 RX ADMIN — HYDROMORPHONE HYDROCHLORIDE 0.5 MG: 1 INJECTION, SOLUTION INTRAMUSCULAR; INTRAVENOUS; SUBCUTANEOUS at 14:19

## 2021-01-01 RX ADMIN — PIPERACILLIN AND TAZOBACTAM 3.38 G: 3; .375 INJECTION, POWDER, LYOPHILIZED, FOR SOLUTION INTRAVENOUS at 17:25

## 2021-01-01 RX ADMIN — HYDROMORPHONE HYDROCHLORIDE 0.5 MG: 1 INJECTION, SOLUTION INTRAMUSCULAR; INTRAVENOUS; SUBCUTANEOUS at 09:33

## 2021-01-01 RX ADMIN — NYSTATIN 500000 UNITS: 100000 SUSPENSION ORAL at 17:44

## 2021-01-01 RX ADMIN — CYCLOBENZAPRINE 10 MG: 10 TABLET, FILM COATED ORAL at 08:51

## 2021-01-01 RX ADMIN — LIDOCAINE 4%: 4 CREAM TOPICAL at 09:00

## 2021-01-01 RX ADMIN — FENTANYL CITRATE 100 MCG: 50 INJECTION, SOLUTION INTRAMUSCULAR; INTRAVENOUS at 09:37

## 2021-01-01 RX ADMIN — POTASSIUM CHLORIDE 10 MEQ: 7.46 INJECTION, SOLUTION INTRAVENOUS at 10:15

## 2021-01-01 RX ADMIN — SODIUM CHLORIDE 50 ML/HR: 9 INJECTION, SOLUTION INTRAVENOUS at 18:58

## 2021-01-01 RX ADMIN — LIDOCAINE 4%: 4 CREAM TOPICAL at 23:28

## 2021-01-01 RX ADMIN — LIDOCAINE 4%: 4 CREAM TOPICAL at 11:06

## 2021-01-01 RX ADMIN — Medication 25 MG: at 09:52

## 2021-01-01 RX ADMIN — MORPHINE SULFATE 2 MG: 2 INJECTION, SOLUTION INTRAMUSCULAR; INTRAVENOUS at 23:39

## 2021-01-01 RX ADMIN — PIPERACILLIN AND TAZOBACTAM 3.38 G: 3; .375 INJECTION, POWDER, LYOPHILIZED, FOR SOLUTION INTRAVENOUS at 19:09

## 2021-01-01 RX ADMIN — PIPERACILLIN AND TAZOBACTAM 3.38 G: 3; .375 INJECTION, POWDER, LYOPHILIZED, FOR SOLUTION INTRAVENOUS at 04:51

## 2021-01-01 RX ADMIN — POLYETHYLENE GLYCOL 3350 17 G: 17 POWDER, FOR SOLUTION ORAL at 09:00

## 2021-01-01 RX ADMIN — GLYCOPYRROLATE 0.2 MG: 0.2 INJECTION, SOLUTION INTRAMUSCULAR; INTRAVENOUS at 20:35

## 2021-01-01 RX ADMIN — PIPERACILLIN AND TAZOBACTAM 3.38 G: 3; .375 INJECTION, POWDER, LYOPHILIZED, FOR SOLUTION INTRAVENOUS at 03:24

## 2021-01-01 RX ADMIN — LORAZEPAM 0.5 MG: 2 INJECTION INTRAMUSCULAR; INTRAVENOUS at 04:24

## 2021-01-01 RX ADMIN — MELATONIN TAB 3 MG 6 MG: 3 TAB at 23:19

## 2021-01-01 RX ADMIN — PIPERACILLIN AND TAZOBACTAM 3.38 G: 3; .375 INJECTION, POWDER, LYOPHILIZED, FOR SOLUTION INTRAVENOUS at 03:34

## 2021-01-01 RX ADMIN — HYDROMORPHONE HYDROCHLORIDE 0.5 MG: 1 INJECTION, SOLUTION INTRAMUSCULAR; INTRAVENOUS; SUBCUTANEOUS at 20:50

## 2021-01-01 RX ADMIN — Medication 5 ML: at 22:00

## 2021-01-01 RX ADMIN — ONDANSETRON 4 MG: 2 INJECTION INTRAMUSCULAR; INTRAVENOUS at 09:52

## 2021-01-01 RX ADMIN — LIDOCAINE 4%: 4 CREAM TOPICAL at 21:07

## 2021-01-01 RX ADMIN — MORPHINE SULFATE 2 MG: 2 INJECTION, SOLUTION INTRAMUSCULAR; INTRAVENOUS at 05:33

## 2021-01-01 RX ADMIN — PROPOFOL 50 MG: 10 INJECTION, EMULSION INTRAVENOUS at 13:18

## 2021-01-01 RX ADMIN — MELATONIN TAB 3 MG 6 MG: 3 TAB at 22:44

## 2021-01-01 RX ADMIN — POTASSIUM CHLORIDE 10 MEQ: 7.46 INJECTION, SOLUTION INTRAVENOUS at 11:33

## 2021-01-01 RX ADMIN — NYSTATIN 500000 UNITS: 100000 SUSPENSION ORAL at 23:28

## 2021-01-01 RX ADMIN — PIPERACILLIN AND TAZOBACTAM 3.38 G: 3; .375 INJECTION, POWDER, LYOPHILIZED, FOR SOLUTION INTRAVENOUS at 18:58

## 2021-01-01 RX ADMIN — NYSTATIN 500000 UNITS: 100000 SUSPENSION ORAL at 08:37

## 2021-01-01 RX ADMIN — HYDROMORPHONE HYDROCHLORIDE 0.5 MG: 1 INJECTION, SOLUTION INTRAMUSCULAR; INTRAVENOUS; SUBCUTANEOUS at 09:21

## 2021-01-01 RX ADMIN — PIPERACILLIN AND TAZOBACTAM 3.38 G: 3; .375 INJECTION, POWDER, LYOPHILIZED, FOR SOLUTION INTRAVENOUS at 03:53

## 2021-01-01 RX ADMIN — Medication 10 ML: at 21:05

## 2021-01-01 RX ADMIN — PIPERACILLIN AND TAZOBACTAM 3.38 G: 3; .375 INJECTION, POWDER, LYOPHILIZED, FOR SOLUTION INTRAVENOUS at 02:55

## 2021-01-01 RX ADMIN — HYDROMORPHONE HYDROCHLORIDE 0.5 MG: 1 INJECTION, SOLUTION INTRAMUSCULAR; INTRAVENOUS; SUBCUTANEOUS at 03:27

## 2021-01-01 RX ADMIN — LORAZEPAM 0.5 MG: 2 INJECTION INTRAMUSCULAR; INTRAVENOUS at 09:07

## 2021-01-01 RX ADMIN — POLYETHYLENE GLYCOL 3350 17 G: 17 POWDER, FOR SOLUTION ORAL at 08:54

## 2021-01-01 RX ADMIN — CYCLOBENZAPRINE 10 MG: 10 TABLET, FILM COATED ORAL at 08:47

## 2021-01-01 RX ADMIN — Medication 10 ML: at 06:55

## 2021-01-01 RX ADMIN — POLYETHYLENE GLYCOL 3350 17 G: 17 POWDER, FOR SOLUTION ORAL at 00:19

## 2021-01-01 RX ADMIN — HYDROMORPHONE HYDROCHLORIDE 0.2 MG: 1 INJECTION, SOLUTION INTRAMUSCULAR; INTRAVENOUS; SUBCUTANEOUS at 23:19

## 2021-01-01 RX ADMIN — MULTIPLE VITAMINS W/ MINERALS TAB 1 TABLET: TAB at 08:47

## 2021-01-01 RX ADMIN — MULTIPLE VITAMINS W/ MINERALS TAB 1 TABLET: TAB at 08:37

## 2021-01-01 RX ADMIN — HYDROMORPHONE HYDROCHLORIDE 0.5 MG: 1 INJECTION, SOLUTION INTRAMUSCULAR; INTRAVENOUS; SUBCUTANEOUS at 11:04

## 2021-01-01 RX ADMIN — Medication 10 ML: at 17:27

## 2021-01-01 RX ADMIN — MELATONIN TAB 3 MG 6 MG: 3 TAB at 21:05

## 2021-01-01 RX ADMIN — LIDOCAINE 4%: 4 CREAM TOPICAL at 08:52

## 2021-01-01 RX ADMIN — Medication 10 ML: at 13:30

## 2021-01-01 RX ADMIN — LORAZEPAM 0.5 MG: 2 INJECTION INTRAMUSCULAR; INTRAVENOUS at 13:13

## 2021-01-01 RX ADMIN — LIDOCAINE 4%: 4 CREAM TOPICAL at 23:20

## 2021-01-01 RX ADMIN — NYSTATIN 500000 UNITS: 100000 SUSPENSION ORAL at 13:49

## 2021-01-01 RX ADMIN — HYDROMORPHONE HYDROCHLORIDE 0.5 MG: 1 INJECTION, SOLUTION INTRAMUSCULAR; INTRAVENOUS; SUBCUTANEOUS at 07:51

## 2021-01-01 RX ADMIN — NEOSTIGMINE METHYLSULFATE 3 MG: 1 INJECTION INTRAVENOUS at 10:47

## 2021-01-01 RX ADMIN — GLYCOPYRROLATE 0.4 MG: 0.2 INJECTION, SOLUTION INTRAMUSCULAR; INTRAVENOUS at 10:47

## 2021-01-01 RX ADMIN — PIPERACILLIN AND TAZOBACTAM 3.38 G: 3; .375 INJECTION, POWDER, LYOPHILIZED, FOR SOLUTION INTRAVENOUS at 11:19

## 2021-01-01 RX ADMIN — CYCLOBENZAPRINE 10 MG: 10 TABLET, FILM COATED ORAL at 21:08

## 2021-01-01 RX ADMIN — DEXAMETHASONE SODIUM PHOSPHATE 4 MG: 4 INJECTION, SOLUTION INTRA-ARTICULAR; INTRALESIONAL; INTRAMUSCULAR; INTRAVENOUS; SOFT TISSUE at 09:52

## 2021-01-01 RX ADMIN — SODIUM CHLORIDE, POTASSIUM CHLORIDE, SODIUM LACTATE AND CALCIUM CHLORIDE: 600; 310; 30; 20 INJECTION, SOLUTION INTRAVENOUS at 09:31

## 2021-01-01 RX ADMIN — MULTIPLE VITAMINS W/ MINERALS TAB 1 TABLET: TAB at 09:15

## 2021-01-01 RX ADMIN — POTASSIUM CHLORIDE 20 MEQ: 1500 TABLET, EXTENDED RELEASE ORAL at 11:04

## 2021-01-01 RX ADMIN — LORAZEPAM 0.5 MG: 2 INJECTION INTRAMUSCULAR; INTRAVENOUS at 10:36

## 2021-01-01 RX ADMIN — ONDANSETRON 4 MG: 2 INJECTION INTRAMUSCULAR; INTRAVENOUS at 13:19

## 2021-01-01 RX ADMIN — NYSTATIN 500000 UNITS: 100000 SUSPENSION ORAL at 13:00

## 2021-01-01 RX ADMIN — LORAZEPAM 0.5 MG: 2 INJECTION INTRAMUSCULAR; INTRAVENOUS at 20:35

## 2021-01-01 RX ADMIN — HYDROMORPHONE HYDROCHLORIDE 0.5 MG: 1 INJECTION, SOLUTION INTRAMUSCULAR; INTRAVENOUS; SUBCUTANEOUS at 09:07

## 2021-01-01 RX ADMIN — HYDROMORPHONE HYDROCHLORIDE 0.5 MG: 1 INJECTION, SOLUTION INTRAMUSCULAR; INTRAVENOUS; SUBCUTANEOUS at 11:33

## 2021-01-01 RX ADMIN — HYDROMORPHONE HYDROCHLORIDE 0.5 MG: 1 INJECTION, SOLUTION INTRAMUSCULAR; INTRAVENOUS; SUBCUTANEOUS at 20:34

## 2021-01-01 RX ADMIN — GLYCOPYRROLATE 0.2 MG: 0.2 INJECTION, SOLUTION INTRAMUSCULAR; INTRAVENOUS at 00:15

## 2021-01-01 RX ADMIN — POTASSIUM CHLORIDE 60 MEQ: 1500 TABLET, EXTENDED RELEASE ORAL at 18:55

## 2021-01-01 RX ADMIN — Medication 10 ML: at 23:19

## 2021-03-17 NOTE — PATIENT INSTRUCTIONS
     
Routine Health maintenance: You need to get a yearly follow up/physical exam to review, discuss age and gender appropriate exams, labs, vaccines and screening tests. This includes cardiovascular health risk, cancer screens and other lizbet related topics. Medications-Take all medications as directed. Please do not stop unless you talk to your doctor or health care provider first. Report any problems immediately. Referrals: if you have been given a referral, please call the office if you do not hear from provider in one week. You may make the appointment yourself. Please keep all appointments with specialists and ask them to send their notes, thoughts, recommendations to us , as your PCP. Imaging/Labs:  Be sure to get these images in a timely manner. IF your test must be scheduled, let us know if you need help getting this done and if you do not hear from that provider in a week , call us or them.   BE SURE to call the office if you do not hear regarding the results in one week after the test is performed Image or lab). It is our intention to inform you of the results ALWAYS, even if normal you should get a notification (Call, portal message). PLEASE mya if you do not get the results. PLEASE follow all recommendations and call/come in /ask questions if you do not understand of if problems develop after or in between visits. Failure to comply with recommended health care advise could result in serious health consequences. Thank you for choosing our practice and please let us know how we can help you feel better and stay well! Smoking tobacco can cause the following issues, including but not limited to; 
 
CANCER of mouth, tongue, throat (Larynx), LUNG, esophagus, stomach, bladder among others. COPD/Emphysema (breathing issues) Damage to blood vessels affecting circulation to eyes, kidneys, fingers, toes as well as genitals and can ultimately affect the healing process AND blood flow to vital organs. Increase risk to have a heart attack or stroke Increase heart rate and blood pressure Chronic sinus issues Decrease taste and smell

## 2021-03-17 NOTE — PROGRESS NOTES
IDENTIFYING INFORMATION:  Yaya Casarez. Renetta Mendez , 76 y.o., female [facility] CHIEF COMPLAINT:  
Chief Complaint Patient presents with  New Patient  
  c/o \"severe pain in right should pain\". Macy Dakin in June and was seen at [facility].    
 Weight Loss 20+ lb weight loss HISTORY OF PRESENT ILLNESS: 
Lake Olszewski is a 76 y.o. female with the below listed past medical history and comes in to the office today for new patient visit. Complains of pain in her shoulder, radiating around to thorax and under right lower ribs. Been steadily getting worse since February and fell in June 2020. Has tried multiple medications and says that none work, list includes diclofenac, metaxolone, methocarbamol,ibuprofen, megesterol. Poor appetite and says everything tastes poorly. NO numbness, burning, tingling or weakness per se. Legs feel weak but she has not had any falls besides the one in June. Some sore throat. In June, she was walking with a laundry basket she fell. PAST MEDICAL HISTORY:  
Past Medical History:  
Diagnosis Date  Thrombocytosis ([facility] [address] MEDICATIONS:  
No current outpatient medications on file prior to visit. No current facility-administered medications on file prior to visit. ALLERGIES: 
No Known Allergies SOCIAL HISTORY:  
Social History Tobacco Use  Smoking status: Current Every Day Smoker Packs/day: 1.00 Types: Cigarettes  Smokeless tobacco: Never Used Substance Use Topics  Alcohol use: Not Currently  Drug use: Not on file SURGICAL HISTORY: 
Past Surgical History:  
Procedure Laterality Date  HX GI    
 hemrrhoid FAMILY HISTORY: 
Family History Problem Relation Age of Onset  Cancer Mother   
     colon  Alzheimer Mother  Cancer Father   
     lung REVIEW OF SYSTEMS: 
I personally collected this information from all available source present (patient/others in room and records available) [facility] Review of Systems Constitutional: Positive for fatigue. Negative for activity change, appetite change, chills, diaphoresis, fever and unexpected weight change. HENT: Positive for trouble swallowing. Negative for congestion, ear discharge, ear pain, hearing loss, rhinorrhea, sinus pressure, sneezing, sore throat, tinnitus and voice change. Eyes: Negative for photophobia, pain, discharge and visual disturbance. Respiratory: Negative for cough, chest tightness, shortness of breath and wheezing. Cardiovascular: Negative for chest pain, palpitations and leg swelling. Gastrointestinal: Negative for abdominal distention, abdominal pain, blood in stool, constipation, diarrhea, nausea and vomiting. Endocrine: Negative for cold intolerance, heat intolerance, polydipsia and polyphagia. Genitourinary: Negative for difficulty urinating, dysuria, frequency, hematuria and urgency. Musculoskeletal: Positive for arthralgias, back pain, joint swelling and myalgias. Negative for gait problem and neck pain. Skin: Negative for color change and rash. Neurological: Negative for dizziness, tremors, syncope, speech difficulty, weakness, light-headedness, numbness and headaches. Hematological: Negative for adenopathy. Does not bruise/bleed easily. Psychiatric/Behavioral: Positive for sleep disturbance. Negative for agitation, behavioral problems, confusion, decreased concentration, dysphoric mood, hallucinations and suicidal ideas. The patient is nervous/anxious. PHYSICAL EXAMINATION: 
Vital Signs: 
 
Visit Vitals /65 (BP 1 Location: Left upper arm, BP Patient Position: Sitting) Pulse 87 Temp 97.5 °F (36.4 °C) (Temporal) Ht 5' 8\" (1.727 m) Wt 96 lb (43.5 kg) SpO2 97% BMI 14.60 kg/m² Wt Readings from Last 3 Encounters:  
03/17/21 96 lb (43.5 kg) BP Readings from Last 3 Encounters:  
03/17/21 132/65 Physical Exam 
Nursing note reviewed. Constitutional:   
   General: She is not in acute distress. Appearance: Normal appearance. She is not ill-appearing or toxic-appearing. HENT:  
   Right Ear: Tympanic membrane, ear canal and external ear normal.  
   Left Ear: Tympanic membrane, ear canal and external ear normal.  
   Nose: No congestion or rhinorrhea. Mouth/Throat:  
   Pharynx: No oropharyngeal exudate or posterior oropharyngeal erythema. Eyes:  
   General: No scleral icterus. Extraocular Movements: Extraocular movements intact. Conjunctiva/sclera: Conjunctivae normal.  
   Pupils: Pupils are equal, round, and reactive to light. Neck: Musculoskeletal: No neck rigidity or muscular tenderness. Vascular: No carotid bruit. Cardiovascular:  
   Rate and Rhythm: Normal rate and regular rhythm. Heart sounds: No murmur. No friction rub. No gallop. Pulmonary:  
   Effort: Pulmonary effort is normal.  
   Breath sounds: Normal breath sounds. No wheezing, rhonchi or rales. Abdominal:  
   General: Bowel sounds are normal. There is no distension. Palpations: Abdomen is soft. There is no mass. Tenderness: There is no abdominal tenderness. Musculoskeletal:     
   General: Tenderness and deformity present. No swelling. Right lower leg: No edema. Left lower leg: No edema. Comments: There are no skin rashes to indicate any zoster. However, she was sore and tender along the mid thorax at from about T 5 to about T12 and around to the right under the ribs. I felt no crepitance or gross deformities besides a hyperkyphotic T-spine, rounded shoulders, arthritic hands and very frail appearing. Lymphadenopathy:  
   Cervical: No cervical adenopathy. Skin: 
   Capillary Refill: Capillary refill takes less than 2 seconds. Coloration: Skin is not jaundiced. Findings: No erythema or rash. Neurological:  
   General: No focal deficit present.   
   Mental Status: She is alert and oriented to person, place, and time. Mental status is at baseline. Cranial Nerves: No cranial nerve deficit. Sensory: No sensory deficit. Coordination: Coordination normal.  
   Gait: Gait normal.  
   Deep Tendon Reflexes: Reflexes normal.  
   Comments: he was very weak generally in motor strength testing upper and lower extremities there is no asymmetry +3/5 bilaterally and that she could not overcome my resistance with gentle pushing. Psychiatric:     
   Mood and Affect: Mood normal.     
   Behavior: Behavior normal.     
   Thought Content: Thought content normal.     
   Judgment: Judgment normal.  
 
 
 
ASSESSMENT/PLAN:   
Discussion (regarding today's visit with Fredis Sanchez); 
75-year-old lady, new to this practice. She does having major medical problems been recently diagnosed with thrombocytosis. Interestingly, she is having severe pain and cannot rest and get comfortable. Her daughter is with her and indicates that she cannot rest and that they both state her quality of life is diminished. We cannot manage only her pain chronically. However, I did give her 9 tablets of oxycodone 10 mg; one tablet orally up to 3 times daily. However, hopefully, she can take it in the evening and let it rest.  That we will can engage where her pain is certainly pain specialist recommended if she needs more or longer term. However, she was warned about the risk of this medicine. Also, I did add a small dose of tizanidine for her muscles and some lidocaine cream.  I will order x-rays of her thoracic spine and right shoulder. Also, I will get the lab work from the outside [place] [id number]. I will see her in 2 weeks to see how she is doing. Also, she seems to be having some swallowing or throat issues perhaps that needs to be examined with an upper GI. She has had some weight loss.   Due to her platelet issues I do not want to start any nonsteroidals until I cleared through her oncologist/hematologist. 
  ICD-10-CM ICD-9-CM 1. Thrombocytosis (HCC)  D47.3 238.71   
2. Chronic right shoulder pain  M25.511 719.41 oxyCODONE IR (ROXICODONE) 10 mg tab immediate release tablet G89.29 338.29 lidocaine HCL (XYLOCAINE) 3 % topical cream  
   XR SHOULDER RT AP/LAT MIN 2 V  
3. Chronic right-sided thoracic back pain  M54.6 724.1 oxyCODONE IR (ROXICODONE) 10 mg tab immediate release tablet G89.29 338.29 lidocaine HCL (XYLOCAINE) 3 % topical cream  
   tiZANidine (ZANAFLEX) 2 mg tablet XR SPINE THORAC 3 V  
4. Oropharyngeal dysphagia  R13.12 787.22 XR UPPER GI SERIES W KUB WE reviewed each diagnosis listed for today's visit including medications, treatment, testing such as labs, imagine, referrals and when to call regarding results and appointments. Reminded patient to keep any and all appointments with specialists, labs, imaging. Reminded patient to make sure we get copies of any specialists care, labs and imaging. Reminded patient to call of come by the office if there are any concerns, questions , comments or problems. The patient verbalized understanding of the care plan and all questions were answered to the patient's satisfaction prior to leaving the office. The patient was told that failure to comply with recommended testing could result in abnormal health consequences. The patient was instructed to have yearly routine health maintenance including but not limited to age appropriate vaccines, testing, screening exams. ALL questions were answered to her satisfaction before leaving the office. The patient actively participated in medical decision making. FOLLOW UP:  
Patient knows to keep any and all future visits scheduled unless told otherwise. Patient knows to call, come back if any concerns, questions, comments or problems arise. Follow-up and Dispositions · Return in about 2 weeks (around 3/31/2021) for follow up pain.  
  
 
 
  
 
 
 
This visit may have been completed , in part, with voice recognition software as well as typing and may have syntax errors despite editing.    
 
Viral Shah, DO

## 2021-03-23 NOTE — PROGRESS NOTES
Mild arthritis , nothing to cause the severe pain she is having. I recommend seeing a specialist, like a rheumatologist or similar. WE need to do more work up.

## 2021-04-01 PROBLEM — G89.29 CHRONIC RIGHT SHOULDER PAIN: Status: ACTIVE | Noted: 2021-01-01

## 2021-04-01 PROBLEM — R13.12 OROPHARYNGEAL DYSPHAGIA: Status: ACTIVE | Noted: 2021-01-01

## 2021-04-01 PROBLEM — M25.511 CHRONIC RIGHT SHOULDER PAIN: Status: ACTIVE | Noted: 2021-01-01

## 2021-04-01 PROBLEM — M54.6 CHRONIC RIGHT-SIDED THORACIC BACK PAIN: Status: ACTIVE | Noted: 2021-01-01

## 2021-04-01 PROBLEM — G89.29 CHRONIC RIGHT-SIDED THORACIC BACK PAIN: Status: ACTIVE | Noted: 2021-01-01

## 2021-04-01 NOTE — PROGRESS NOTES
IDENTIFYING INFORMATION:  Corin Ibrahim. Yokasta Johnston , 76 y.o., female [facility] CHIEF COMPLAINT:  
Chief Complaint Patient presents with  Pain (Chronic) Was not able to complete Upper GI due to back pain. HISTORY OF PRESENT ILLNESS: 
Yuliana Crawford is a 76 y.o. female  has a past medical history of Thrombocytosis ([facility] [address] .  she comes in for folow up on pain and dysphagia. Cannot get haircut and cannot stand or walk and she didn't get the UGI because she hurt her back. xrays show no major damges but she is very frail. Has thrombocytosis. Says the 10 mg oxycodone did help somewhat. Not a lot but allowed her to rest and got to some appointments. Some constipatin but that predates the previous prescription. Takes a lot of nsaids, like 8-10 ibuprofen per day. /  
 
PAST MEDICAL HISTORY:  
Past Medical History:  
Diagnosis Date  [facility] MEDICATIONS:  
Current Outpatient Medications on File Prior to Visit Medication Sig Dispense Refill  iron aspgly,[id number] (FERREX 150 FORTE PLUS) 118-51-90-0 mg-mg-mcg-mg cap cap Take 1 Cap by mouth two (2) times a day.  lidocaine HCL (XYLOCAINE) 3 % topical cream Apply  to affected area two (2) times a day. 85 g 0  
 tiZANidine (ZANAFLEX) 2 mg tablet One orally TID for back and shoulder pain 60 Tab 3 No current facility-administered medications on file prior to visit. ALLERGIES: 
No Known Allergies SOCIAL HISTORY:  
Social History Tobacco Use  Smoking status: Current Every Day Smoker Packs/day: 1.00 Types: Cigarettes  Smokeless tobacco: Never Used Substance Use Topics  Alcohol use: Not Currently  Drug use: Not on file SURGICAL HISTORY: 
Past Surgical History:  
Procedure Laterality Date  HX GI    
 hemrrhoid FAMILY HISTORY: 
Family History Problem Relation Age of Onset  Cancer Mother   
     colon  Alzheimer Mother  Cancer Father lung REVIEW OF SYSTEMS: 
I personally collected this information from all available source present (patient/others in room and records available) -[facility] [place] Review of Systems Constitutional: Positive for activity change, appetite change and fatigue. Negative for chills, diaphoresis, fever and unexpected weight change. HENT: Negative for congestion, ear discharge, ear pain, hearing loss, rhinorrhea, sinus pressure, sneezing, sore throat, tinnitus, trouble swallowing and voice change. Eyes: Negative for photophobia, pain, discharge and visual disturbance. Respiratory: Negative for cough, chest tightness, shortness of breath and wheezing. Cardiovascular: Negative for chest pain, palpitations and leg swelling. Gastrointestinal: Positive for constipation. Negative for abdominal distention, abdominal pain, blood in stool, diarrhea, nausea and vomiting. Endocrine: Negative for cold intolerance, heat intolerance, polydipsia and polyphagia. Genitourinary: Negative for difficulty urinating, dysuria, frequency, hematuria and urgency. Musculoskeletal: Positive for arthralgias, back pain, gait problem, joint swelling, myalgias and neck pain. Skin: Negative for color change and rash. Neurological: Positive for headaches. Negative for dizziness, tremors, syncope, speech difficulty, weakness, light-headedness and numbness. Hematological: Negative for adenopathy. Does not bruise/bleed easily. Psychiatric/Behavioral: Negative for agitation, behavioral problems, confusion, decreased concentration, dysphoric mood, hallucinations, sleep disturbance and suicidal ideas. The patient is not nervous/anxious. PHYSICAL EXAMINATION: 
Vital Signs: 
 
Visit Vitals BP (!) 121/56 (BP 1 Location: Left upper arm, BP Patient Position: Sitting) Pulse 91 Temp 97.5 °F (36.4 °C) (Temporal) Ht 5' 8\" (1.727 m) Wt 95 lb (43.1 kg) SpO2 96% BMI 14.44 kg/m² Wt Readings from Last 3 Encounters:  
04/01/21 95 lb (43.1 kg) 03/17/21 96 lb (43.5 kg) BP Readings from Last 3 Encounters:  
04/01/21 (!) 121/56  
03/17/21 132/65 Physical Exam 
Nursing note reviewed. Constitutional:   
   General: She is not in acute distress. Appearance: Normal appearance. She is not ill-appearing or toxic-appearing. Eyes:  
   General: No scleral icterus. Extraocular Movements: Extraocular movements intact. Conjunctiva/sclera: Conjunctivae normal.  
   Pupils: Pupils are equal, round, and reactive to light. Neck: Musculoskeletal: No neck rigidity or muscular tenderness. Vascular: No carotid bruit. Cardiovascular:  
   Rate and Rhythm: Normal rate and regular rhythm. Heart sounds: No murmur. No friction rub. No gallop. Pulmonary:  
   Effort: Pulmonary effort is normal.  
   Breath sounds: Normal breath sounds. No wheezing, rhonchi or rales. Abdominal:  
   General: Bowel sounds are normal. There is no distension. Palpations: Abdomen is soft. There is no mass. Tenderness: There is no abdominal tenderness. Musculoskeletal:     
   General: Swelling, tenderness and deformity present. Right lower leg: No edema. Left lower leg: No edema. Comments: She is very frail appearing in her hands which have swollen MCPs. Her neck is tender and slightly exacerbated cervical lordosis. She is hyperkyphotic T-spine is very tender from about T4-T8. She also has frail lumbar spine and multiple tender muscle knots. She is palpably tender around the right parascapular muscles under the scapula and around to those ribs 7 and 8. There are no skin rashes or lesions noted she has some anterior chest wall tenderness on the right. Her knees are slightly swollen medially and decreased range of motion. Her legs and arms are very frail appearing Lymphadenopathy:  
   Cervical: No cervical adenopathy.   
Skin: 
   Capillary Refill: Capillary refill takes less than 2 seconds. Coloration: Skin is not jaundiced. Findings: No erythema or rash. Neurological:  
   General: No focal deficit present. Mental Status: She is alert and oriented to person, place, and time. Mental status is at baseline. Cranial Nerves: No cranial nerve deficit. Sensory: No sensory deficit. Motor: Weakness (+4/5 bilateral upper extremities. No tremor or rigidity but definitely some diffuse atrophy hands arms and legs.) present. Coordination: Coordination normal.  
   Gait: Gait normal.  
   Deep Tendon Reflexes: Reflexes normal.  
Psychiatric:     
   Mood and Affect: Mood normal.     
   Behavior: Behavior normal.     
   Thought Content: Thought content normal.     
   Judgment: Judgment normal.  
 
 
 
ASSESSMENT/PLAN:   
Discussion (regarding today's visit with Olga Lidia Turner); Interestingly, the patient could not get her upper GI. Therefore, I will go ahead and just send her to the gastroenterologist.  I will also send her to a pain specialist because I am not sure what else to do no more to keep her on narcotics but I think they seem appropriate in this instance so I will give her enough oxycodone to get her through to her appointment. She lives with her . Her granddaughter brings her in and does indicate that she is suffering fairly badly. I do think 1 pill 2 or 3 times per day maximum will help but the patient says she will probably only take it when she has stuff to do and at night. I agree with that. And certainly, quality life is not very good right now. If she continues to get it from this office she will need a urine drug screen and controlled subs agreement and I do think her potential for misuse or diversion is very low at this point and I feel she needs a little better quality of her life. ICD-10-CM ICD-9-CM 1. Thrombocytosis (HCC)  D47.3 238.71   
2.  Chronic right shoulder pain  M25.511 719.41 oxyCODONE IR (ROXICODONE) 10 mg tab immediate release tablet G89.29 338.29 REFERRAL TO PAIN MANAGEMENT 3. Chronic right-sided thoracic back pain  M54.6 724.1 oxyCODONE IR (ROXICODONE) 10 mg tab immediate release tablet G89.29 338.29 REFERRAL TO PAIN MANAGEMENT 4. Oropharyngeal dysphagia  R13.12 787.22 REFERRAL TO GASTROENTEROLOGY  
 
WE reviewed each diagnosis listed for today's visit including medications, treatment, testing such as labs, imagine, referrals and when to call regarding results and appointments. Reminded patient to keep any and all appointments with specialists, labs, imaging. Reminded patient to make sure we get copies of any specialists care, labs and imaging. Reminded patient to call of come by the office if there are any concerns, questions , comments or problems. The patient verbalized understanding of the care plan and all questions were answered to the patient's satisfaction prior to leaving the office. The patient was told that failure to comply with recommended testing could result in abnormal health consequences. The patient was instructed to have yearly routine health maintenance including but not limited to age appropriate vaccines, testing, screening exams. ALL questions were answered to her satisfaction before leaving the office. The patient actively participated in medical decision making. FOLLOW UP:  
Patient knows to keep any and all future visits scheduled unless told otherwise. Patient knows to call, come back if any concerns, questions, comments or problems arise. Follow-up and Dispositions · Return in about 4 weeks (around 4/29/2021) for Follow up pain, platelets. This visit may have been completed , in part, with voice recognition software as well as typing and may have syntax errors despite editing.    
 
James Reardon,

## 2021-04-01 NOTE — PROGRESS NOTES
1. Have you been to the ER, urgent care clinic since your last visit? Hospitalized since your last visit? No 
 
2. Have you seen or consulted any other health care providers outside of the [address] [place] [address] since your last visit? Include any pap smears or colon screening. Hematologist at [facility]. Chief Complaint Patient presents with  Pain (Chronic) Was not able to complete Upper GI due to back pain. Visit Vitals BP (!) 121/56 (BP 1 Location: Left upper arm, BP Patient Position: Sitting) Pulse 91 Temp 97.5 °F (36.4 °C) (Temporal) Ht 5' 8\" (1.727 m) Wt 95 lb (43.1 kg) SpO2 96% BMI 14.44 kg/m²

## 2021-04-01 NOTE — PATIENT INSTRUCTIONS
     
Routine Health maintenance: You need to get a yearly follow up/physical exam to review, discuss age and gender appropriate exams, labs, vaccines and screening tests. This includes cardiovascular health risk, cancer screens and other lizbet related topics. Medications-Take all medications as directed. Please do not stop unless you talk to your doctor or health care provider first. Report any problems immediately. Referrals: if you have been given a referral, please call the office if you do not hear from provider in one week. You may make the appointment yourself. Please keep all appointments with specialists and ask them to send their notes, thoughts, recommendations to us , as your PCP. Imaging/Labs:  Be sure to get these images in a timely manner. IF your test must be scheduled, let us know if you need help getting this done and if you do not hear from that provider in a week , call us or them.   BE SURE to call the office if you do not hear regarding the results in one week after the test is performed Image or lab). It is our intention to inform you of the results ALWAYS, even if normal you should get a notification (Call, portal message). PLEASE mya if you do not get the results. PLEASE follow all recommendations and call/come in /ask questions if you do not understand of if problems develop after or in between visits. Failure to comply with recommended health care advise could result in serious health consequences. Thank you for choosing our practice and please let us know how we can help you feel better and stay well!

## 2021-04-20 PROBLEM — R91.8 LUNG MASS: Status: ACTIVE | Noted: 2021-01-01

## 2021-04-20 PROBLEM — J18.9 PNEUMONIA: Status: ACTIVE | Noted: 2021-01-01

## 2021-04-20 NOTE — ED PROVIDER NOTES
EMERGENCY DEPARTMENT HISTORY AND PHYSICAL EXAM 
 
 
Date: 4/20/2021 Patient Name: Kathy Hernandez History of Presenting Illness Chief Complaint Patient presents with  Shortness of Breath  Back Pain  Dysphagia History Provided By: Patient and Patient's Daughter HPI: Kathy Hernandez, 76 y.o. female with a past medical history significant anemia, chronic smoker, daily alcohol use presents to the ED with cc of generalized weakness and shortness of breath, constant and ongoing for a number of months. Patient reports she is also lost a significant amount of weight since December (4 months ago), 25 pounds in total.  She has not been trying to lose weight. She has seen her PCP with the complaints of cough and shortness of breath. Routine labs completed. Other current symptoms include decreased appetite and dysphagia. Patient reports food does not taste good to her and she has trouble swallowing anything. She is scheduled for an upper endoscopy with GI in a couple of days. She has also been scheduled to see outpatient hematology oncology. Other symptoms include back pain which she reports is chronic for her but has been worsening. She specifically denies fever, recent travel, sick contacts, falls, head injury, headache, history of stroke, leg swelling, dysuria, hematuria, abdominal pain, vomiting, diarrhea. There are no other complaints, changes, or physical findings at this time. PCP: Graydon Klinefelter, DO 
 
Current Facility-Administered Medications Medication Dose Route Frequency Provider Last Rate Last Admin  EPINEPHrine (ADRENALIN) 0.1 mg/mL syringe    PRN Bryce June MD   0.8 mg at 04/22/21 1340  lidocaine (XYLOCAINE) 10 mg/mL (1 %) injection    PRN Bryce June MD   10 mL at 04/22/21 1413  
 mineral oil (topical)    PRN Bryce June MD   5 mL at 04/22/21 1335  
 HYDROmorphone (DILAUDID) syringe 0.5 mg  0.5 mg IntraVENous Q4H PRN Ike Ellsworth MD   0.5 mg at 04/22/21 1634  
 albuterol-ipratropium (DUO-NEB) 2.5 MG-0.5 MG/3 ML  3 mL Nebulization Q4H PRN Adal Young MD      
 melatonin tablet 6 mg  6 mg Oral QHS Kaylee Joyner MD   6 mg at 04/21/21 2207  nystatin (MYCOSTATIN) 100,000 unit/mL oral suspension 500,000 Units  500,000 Units Oral QID Scooby Messer MD   Stopped at 04/22/21 1300  
 multivitamin, tx-iron-ca-min (THERA-M w/ IRON) tablet 1 Tab  1 Tab Oral DAILY Oral MD Gui   1 Tab at 04/22/21 8474  lidocaine (XYLOCAINE) 4 % cream   Topical BID Oral MD Gui   Given at 04/22/21 5669  cyclobenzaprine (FLEXERIL) tablet 10 mg  10 mg Oral TID Scooby Messer MD   10 mg at 04/22/21 1635  sodium chloride (NS) flush 5-40 mL  5-40 mL IntraVENous Tammy Cartagena MD   Stopped at 04/22/21 1400  
 sodium chloride (NS) flush 5-40 mL  5-40 mL IntraVENous PRN Tammy Joyner MD      
 acetaminophen (TYLENOL) tablet 650 mg  650 mg Oral Q6H PRN Scooby Messer MD      
 Or  
 acetaminophen (TYLENOL) suppository 650 mg  650 mg Rectal Q6H PRN Scooby Messer MD      
 polyethylene glycol [facility]) packet 17 g  17 g Oral DAILY PRN Kaylee Joyner MD      
 [facility] tablet 12.5 mg  12.5 mg Oral Q6H PRN Scooby Messer MD      
 Or  
 [facility] 4 mg  4 mg IntraVENous Q6H PRN Scooby Messer MD      
 piperacillin-tazobactam (ZOSYN) 3.375 g in 0.9% sodium chloride (MBP/ADV) 100 mL MBP  3.375 g IntraVENous Kaylee Cartagena  mL/hr at 04/22/21 1119 3.375 g at 04/22/21 1119 Past History Past Medical History: 
Past Medical History:  
Diagnosis Date  Anemia  Thrombocytosis ([address] Past Surgical History: 
Past Surgical History:  
Procedure Laterality Date  HX GI    
 hemrrhoid Family History: 
Family History Problem Relation Age of Onset  Cancer Mother   
     colon  Alzheimer Mother  Cancer Father   
     lung Social History: 
Social History Tobacco Use  Smoking status: Current Every Day Smoker Packs/day: 1.00 Types: Cigarettes  Smokeless tobacco: Never Used Substance Use Topics  Alcohol use: Not Currently  Drug use: Not on file Allergies: 
No Known Allergies Review of Systems Review of Systems Constitutional: Positive for activity change, appetite change, fatigue and unexpected weight change. Negative for chills and fever. HENT: Positive for mouth sores and trouble swallowing. Negative for congestion, ear pain, rhinorrhea, sneezing and sore throat. Eyes: Negative for pain and visual disturbance. Respiratory: Positive for cough and shortness of breath. Cardiovascular: Negative for chest pain. Gastrointestinal: Negative for abdominal pain, diarrhea, nausea and vomiting. Genitourinary: Negative for dysuria and hematuria. Musculoskeletal: Positive for back pain. Negative for gait problem. Skin: Negative for rash. Neurological: Positive for weakness. Negative for speech difficulty and headaches. Psychiatric/Behavioral: The patient is not nervous/anxious. All other systems reviewed and are negative. Physical Exam  
Physical Exam 
Vitals signs and nursing note reviewed. Constitutional:   
   General: She is not in acute distress. Appearance: She is cachectic. She is ill-appearing. She is not toxic-appearing. Comments: Emaciated HENT:  
   Head: Normocephalic and atraumatic. Nose: Nose normal.  
   Mouth/Throat:  
   Mouth: Mucous membranes are moist.  
   Pharynx: Oropharynx is clear. Comments: +white patches to pharynx, tongue with erythema Eyes:  
   Extraocular Movements: Extraocular movements intact. Conjunctiva/sclera: Conjunctivae normal.  
   Pupils: Pupils are equal, round, and reactive to light. Neck: Musculoskeletal: Normal range of motion. Cardiovascular:  
   Rate and Rhythm: Normal rate. Pulses: Normal pulses. Heart sounds: Normal heart sounds. No murmur. Pulmonary:  
   Effort: Pulmonary effort is normal. No respiratory distress. Breath sounds: Normal breath sounds. Abdominal:  
   General: Bowel sounds are normal.  
   Palpations: Abdomen is soft. Tenderness: There is no abdominal tenderness. Musculoskeletal:     
   General: No deformity or signs of injury. Thoracic back: She exhibits decreased range of motion and bony tenderness. Right lower leg: No edema. Left lower leg: No edema. Comments: +significant bony protrusion of the spinous processes secondary to cachexia Lymphadenopathy:  
   Cervical: No cervical adenopathy. Upper Body:  
   Right upper body: No supraclavicular adenopathy. Left upper body: No supraclavicular adenopathy. Skin: 
   General: Skin is warm and dry. Capillary Refill: Capillary refill takes less than 2 seconds. Findings: No rash. Neurological:  
   General: No focal deficit present. Mental Status: She is alert and oriented to person, place, and time. Cranial Nerves: No cranial nerve deficit. Psychiatric:     
   Mood and Affect: Mood normal.  
 
 
 
Diagnostic Study Results Labs - Recent Results (from the past 48 hour(s)) COVID-19 RAPID TEST Collection Time: 04/20/21  6:30 PM  
Result Value Ref Range Specimen source Nasopharyngeal    
 COVID-19 rapid test Not Detected Not Detected SARS-COV-2 Collection Time: 04/20/21  6:30 PM  
Result Value Ref Range SARS-CoV-2 Please find results under separate order CULTURE, BLOOD, PAIRED Collection Time: 04/20/21  6:55 PM  
 Specimen: Blood Result Value Ref Range Special Requests: No Special Requests Culture result: No growth after 20 hours INFLUENZA A & B AG (RAPID TEST)  Collection Time: 04/20/21  7:36 PM Result Value Ref Range Influenza A Antigen Negative Negative Influenza B Antigen Negative Negative URINALYSIS W/ REFLEX CULTURE Collection Time: 04/20/21 11:18 PM  
 Specimen: Urine Result Value Ref Range Color Yellow/Straw Appearance Turbid (A) Clear Specific gravity PENDING pH (UA) 5.0 5.0 - 8.0 Protein Negative Negative mg/dL Glucose Negative Negative mg/dL Ketone Negative Negative mg/dL Bilirubin Negative Negative Blood Small (A) Negative Urobilinogen 0.1 0.1 - 1.0 EU/dL Nitrites Positive (A) Negative Leukocyte Esterase Large (A) Negative UA:UC IF INDICATED PENDING   
 WBC 10-20 0 - 4 /hpf  
 RBC 20-50 0 - 5 /hpf Bacteria 1+ (A) Negative /hpf Mucus Trace /lpf METABOLIC PANEL, COMPREHENSIVE Collection Time: 04/21/21  3:08 AM  
Result Value Ref Range Sodium 140 136 - 145 mmol/L Potassium 3.8 3.5 - 5.1 mmol/L Chloride 107 97 - 108 mmol/L  
 CO2 29 21 - 32 mmol/L Anion gap 4 (L) 5 - 15 mmol/L Glucose 83 65 - 100 mg/dL BUN 15 6 - 20 mg/dL Creatinine 0.43 (L) 0.55 - 1.02 mg/dL BUN/Creatinine ratio 35 (H) 12 - 20 GFR est AA >60 >60 ml/min/1.73m2 GFR est non-AA >60 >60 ml/min/1.73m2 Calcium 10.2 (H) 8.5 - 10.1 mg/dL Bilirubin, total 0.3 0.2 - 1.0 mg/dL AST (SGOT) 6 (L) 15 - 37 U/L  
 ALT (SGPT) 11 (L) 12 - 78 U/L Alk. phosphatase 105 45 - 117 U/L Protein, total 6.5 6.4 - 8.2 g/dL Albumin 2.3 (L) 3.5 - 5.0 g/dL Globulin 4.2 (H) 2.0 - 4.0 g/dL A-G Ratio 0.5 (L) 1.1 - 2.2    
CBC WITH AUTOMATED DIFF Collection Time: 04/21/21  3:08 AM  
Result Value Ref Range WBC 16.6 (H) 3.6 - 11.0 K/uL  
 RBC 3.36 (L) 3.80 - 5.20 M/uL HGB 8.3 (L) 11.5 - 16.0 g/dL HCT 27.9 (L) 35.0 - 47.0 % MCV 83.0 80.0 - 99.0 FL  
 MCH 24.7 (L) 26.0 - 34.0 PG  
 MCHC 29.7 (L) 30.0 - 36.5 g/dL  
 RDW 17.4 (H) 11.5 - 14.5 % PLATELET 179 (H) 096 - 400 K/uL  MPV 8.5 (L) 8.9 - 12.9 FL  
 NEUTROPHILS 86 (H) 32 - 75 % LYMPHOCYTES 8 (L) 12 - 49 % MONOCYTES 6 5 - 13 % EOSINOPHILS 0 0 - 7 % BASOPHILS 0 0 - 1 % IMMATURE GRANULOCYTES 0 0.0 - 0.5 % ABS. NEUTROPHILS 14.2 (H) 1.8 - 8.0 K/UL  
 ABS. LYMPHOCYTES 1.3 0.8 - 3.5 K/UL  
 ABS. MONOCYTES 1.0 0.0 - 1.0 K/UL  
 ABS. EOSINOPHILS 0.0 0.0 - 0.4 K/UL  
 ABS. BASOPHILS 0.0 0.0 - 0.1 K/UL  
 ABS. IMM. GRANS. 0.1 (H) 0.00 - 0.04 K/UL  
 DF AUTOMATED    
TYPE & SCREEN Collection Time: 04/21/21  7:01 AM  
Result Value Ref Range Crossmatch Expiration 04/24/2021,2359 ABO/Rh(D) O Positive Antibody screen Negative METABOLIC PANEL, BASIC Collection Time: 04/22/21  6:57 AM  
Result Value Ref Range Sodium 135 (L) 136 - 145 mmol/L Potassium 4.5 3.5 - 5.1 mmol/L Chloride 106 97 - 108 mmol/L  
 CO2 23 21 - 32 mmol/L Anion gap 6 5 - 15 mmol/L Glucose 82 65 - 100 mg/dL BUN 12 6 - 20 mg/dL Creatinine 0.48 (L) 0.55 - 1.02 mg/dL BUN/Creatinine ratio 25 (H) 12 - 20 GFR est AA >60 >60 ml/min/1.73m2 GFR est non-AA >60 >60 ml/min/1.73m2 Calcium 10.2 (H) 8.5 - 10.1 mg/dL MAGNESIUM Collection Time: 04/22/21  6:57 AM  
Result Value Ref Range Magnesium 2.2 1.6 - 2.4 mg/dL CBC WITH AUTOMATED DIFF Collection Time: 04/22/21  6:57 AM  
Result Value Ref Range WBC 17.0 (H) 3.6 - 11.0 K/uL  
 RBC 3.80 3.80 - 5.20 M/uL HGB 9.5 (L) 11.5 - 16.0 g/dL HCT 31.8 (L) 35.0 - 47.0 % MCV 83.7 80.0 - 99.0 FL  
 MCH 25.0 (L) 26.0 - 34.0 PG  
 MCHC 29.9 (L) 30.0 - 36.5 g/dL  
 RDW 17.8 (H) 11.5 - 14.5 % PLATELET 268 (H) 644 - 400 K/uL MPV 8.7 (L) 8.9 - 12.9 FL  
 NEUTROPHILS 86 (H) 32 - 75 % LYMPHOCYTES 8 (L) 12 - 49 % MONOCYTES 6 5 - 13 % EOSINOPHILS 0 0 - 7 % BASOPHILS 0 0 - 1 % IMMATURE GRANULOCYTES 0 0.0 - 0.5 % ABS. NEUTROPHILS 14.7 (H) 1.8 - 8.0 K/UL  
 ABS. LYMPHOCYTES 1.3 0.8 - 3.5 K/UL  
 ABS. MONOCYTES 1.0 0.0 - 1.0 K/UL  
 ABS. EOSINOPHILS 0.1 0.0 - 0.4 K/UL  
 ABS.  BASOPHILS 0.0 0.0 - 0.1 K/UL  
 ABS. IMM. GRANS. 0.1 (H) 0.00 - 0.04 K/UL  
 DF AUTOMATED Radiologic Studies -  
XR Results (most recent): 
Results from Hospital Encounter encounter on 04/20/21 XR CHEST PORT Narrative Indication: Post bronc. AP portable upright chest radiograph 1421 hours 4/22/2021. Comparison 20 April 2021. Right hilar mass again noted. No pneumothorax. Incidental right lateral skin fold. Left lung clear. Normal heart size. CT Results  (Last 48 hours) None CT Results (most recent): 
[place] [facility] [place] 04/20/21 CT CHEST W CONT Narrative Exam: CT CHEST W CONT 
 
TECHNIQUE: Multiple transaxial CT images of the chest were obtained following 
the uneventful administration of 100 mL Isovue 370 intravenous contrast. Coronal 
and sagittal reformatted images were provided. MIP images were provided. Dose reduction: All CT scans at this facility are performed using dose reduction 
optimization techniques as appropriate to a performed exam including the 
following: Automated exposure control, adjustments of the mA and/or kV according 
to patient size, or use of iterative reconstruction technique. COMPARISON: Chest radiograph performed same day HISTORY: Cough, weight loss FINDINGS: 
 
Centered at the junction of the medial aspect of the right lower lobe and the 
posterior mediastinum/pleural cavity there is a heterogeneous mass measuring 8.1 
x 5.0 x 6.8 cm that is likely centrally necrotic with central low density. There 
are surrounding areas of groundglass opacification and probable spiculated 
margins within the right lower lobe. The esophagus abuts the mass and appears 
deviated anteriorly and laterally to the left. Direct invasion of the esophagus 
is difficult to exclude. Portions of the mass also directly abut the posterior 
aspect of the heart.  
 
Enlarged right hilar lymph node measuring approximately 15 mm that is suspicious 
for disease involvement. Heart is upper limits of normal for size. No pericardial effusion. Visualized 
portions of the thyroid appear unremarkable. Atherosclerosis of the thoracic 
aorta which is mildly ectatic. Severe emphysematous changes within both lungs. Scarring within the lung apices. No pneumothorax or significant pleural effusion is evident. 4 mm incidental 
solid nodule in the right middle lobe (axial series 204, image 73), 
indeterminate. There is a heterogeneously enhancing region within the right hepatic lobe which 
is otherwise poorly evaluated on this exam. The left adrenal gland appears 
thickened. Compression deformity of T8 of unknown chronicity. Significant vertebral body 
height loss of the vertebra which is greater than 50% of the vertebral body 
height. There is mild posterior buckling of the posterior vertebral body cortex 
without significant spinal canal narrowing evident by CT. Please note that this 
vertebra abuts the above-described mass and a pathologic fracture is therefore 
difficult to exclude. The anterior and right lateral aspect of the T9 vertebral 
body appears mildly eroded, suspicious for direct invasion of the mass. Previously described irregularity of right lower ribs on the prior chest 
radiograph appears most likely to be related to superimposition artifact. No 
suspicious superficial soft tissue abnormalities. Impression 1. Large mass centered at the interface between the right lower lobe and the 
mediastinum, as detailed above, highly suspicious for neoplasm with leading 
differential consideration of lung cancer. 2. The mass abuts several mediastinal structures, including the esophagus and 
the heart with direct invasion difficult to exclude on this exam. 
3. T8 compression fracture of unknown chronicity. The mass also abuts the 
vertebral body making a pathologic fracture possible.  
4. Erosion of the anterior right lateral aspect of the T9 vertebral body which is suspicious for direct invasion by the mass. 5. Enlarged right hilar lymph node, suspicious for disease involvement. 6. Indeterminate 4 mm right middle lobe solid nodule. Medical Decision Making and ED Course I am the first provider for this patient. I reviewed the vital signs, available nursing notes, past medical history, past surgical history, family history and social history. Vital Signs-Reviewed the patient's vital signs. Patient Vitals for the past 12 hrs: 
 Temp Pulse Resp BP SpO2  
04/22/21 1430  93 17 (!) 136/59 95 % 04/22/21 1425  (!) 101 23 126/83 95 % 04/22/21 1420  (!) 104 19 129/89 95 % 04/22/21 1415 98.3 °F (36.8 °C) 100 22 (!) 152/63 96 % 04/22/21 1401  97 24 122/76 99 % 04/22/21 0744 98.2 °F (36.8 °C) 84 18 (!) 99/51 96 % Records Reviewed: Nursing Notes and Old Medical Records Provider Notes (Medical Decision Making): MDM Number of Diagnoses or Management Options Candida esophagitis ([facility] [address] Compression fracture of T8 vertebra, initial encounter ([facility] [address] Cough Right lower lobe lung mass Unintentional weight loss Diagnosis management comments: 43-year-old female with history of anemia, no daily medications, daily smoker. Presenting with significant weight loss over the last 3 months with complaints of cough and shortness of breath. She is also unable to eat secondary to dysphagia. On exam, she is emaciated, ill-appearing, and visibly has Candida esophagitis. Her CT scan of her chest reveals a lung mass in the right lower lobe, mass appears to be growing into the T9 vertebrae. She does have compression deformity fracture. CT soft tissue neck with parotid gland involvement. Patient will be admitted to the hospitalist service for pulmonary consultation and oncology consultation. Patient and daughter agree with plan. Amount and/or Complexity of Data Reviewed Clinical lab tests: ordered and reviewed Tests in the radiology section of CPT®: ordered and reviewed Obtain history from someone other than the patient: yes ED Course:  
Initial assessment performed. The patients presenting problems have been discussed, and they are in agreement with the care plan formulated and outlined with them. I have encouraged them to ask questions as they arise throughout their visit. Procedures Cassidy Butler PA-C Procedures Cassidy Butler PA-C Disposition Disposition: Admitted to Floor Medical Floor the case was discussed with the admitting physician Dr. Rosi Bhatia Admitted Diagnosis Clinical Impression: 1. Right lower lobe lung mass 2. Compression fracture of T8 vertebra, initial encounter ([address] 3. Unintentional weight loss 4. Candida esophagitis ([facility] [address] 5. Cough Attestations: 
 
Cassidy Butler PA-C Please note that this dictation was completed with [employer], the computer voice recognition software. Quite often unanticipated grammatical, syntax, homophones, and other interpretive errors are inadvertently transcribed by the computer software. Please disregard these errors. Please excuse any errors that have escaped final proofreading. Thank you.

## 2021-04-20 NOTE — H&P
GENERAL GENERIC H&P/CONSULT Presenting complaint: Generalized weakness/unexplained weight loss Subjective: 66-year-old female with past medical history of anemia, poor follow-up with physicians presents to [facility] with generalized weakness as well as unexplained weight loss. Patient states over the past few months she has been noticing gradual onset persistent progressive generalized weakness as well as unexplained weight loss following which she presented to the ED. Patient denies any fevers chills nausea vomiting lightheadedness dizziness dyspnea orthopnea paroxysmal nocturnal dyspnea chest pain palpitations headache focal weakness loss sensation auditory or visual symptoms abdominal stool urinary complaints or any other associated symptoms. Patient endorses no recent sick contacts or travel activity Past Medical History:  
Diagnosis Date  Anemia  Thrombocytosis ([address] Past Surgical History:  
Procedure Laterality Date  HX GI    
 hemrrhoid Prior to Admission medications Medication Sig Start Date End Date Taking? Authorizing Provider  
iron aspgly,[id number]-Ca-suc (FERREX 150 FORTE PLUS) 619-54-83-4 mg-mg-mcg-mg cap cap Take 1 Cap by mouth two (2) times a day. Yes Provider, Historical  
lidocaine HCL (XYLOCAINE) 3 % topical cream Apply  to affected area two (2) times a day. 3/17/21  Yes Tally Mary, DO  
tiZANidine (ZANAFLEX) 2 mg tablet One orally TID for back and shoulder pain 3/17/21  Yes Tally Sandoval, DO No Known Allergies Social History Tobacco Use  Smoking status: Current Every Day Smoker Packs/day: 1.00 Types: Cigarettes  Smokeless tobacco: Never Used Substance Use Topics  Alcohol use: Not Currently Family History Problem Relation Age of Onset  Cancer Mother   
     colon  Alzheimer Mother  Cancer Father   
     lung Review of Systems Constitutional: Positive for activity change, appetite change, chills, fatigue and unexpected weight change. Negative for diaphoresis and fever. HENT: Negative for congestion, dental problem, drooling, ear discharge, ear pain, facial swelling, hearing loss, mouth sores, nosebleeds, postnasal drip, rhinorrhea, sinus pressure, sinus pain, sneezing, sore throat, tinnitus, trouble swallowing and voice change. Eyes: Negative for photophobia, pain, discharge, redness, itching and visual disturbance. Respiratory: Positive for cough and shortness of breath. Negative for apnea, choking, chest tightness, wheezing and stridor. Cardiovascular: Negative for chest pain, palpitations and leg swelling. Gastrointestinal: Negative for abdominal distention, abdominal pain, anal bleeding, blood in stool, constipation, diarrhea, nausea, rectal pain and vomiting. Endocrine: Negative for cold intolerance, heat intolerance, polydipsia, polyphagia and polyuria. Genitourinary: Negative for decreased urine volume, difficulty urinating, dyspareunia, dysuria, enuresis, flank pain, frequency, genital sores, hematuria, menstrual problem, pelvic pain, urgency, vaginal bleeding, vaginal discharge and vaginal pain. Musculoskeletal: Negative for arthralgias, back pain, gait problem, joint swelling, myalgias, neck pain and neck stiffness. Skin: Negative for color change, pallor, rash and wound. Allergic/Immunologic: Negative for environmental allergies, food allergies and immunocompromised state. Neurological: Positive for weakness. Negative for dizziness, tremors, seizures, syncope, facial asymmetry, speech difficulty, light-headedness, numbness and headaches. Hematological: Negative for adenopathy. Does not bruise/bleed easily. Psychiatric/Behavioral: Negative for agitation, behavioral problems, confusion, decreased concentration, dysphoric mood, hallucinations, self-injury, sleep disturbance and suicidal ideas. The patient is not nervous/anxious and is not hyperactive. Objective: No intake/output data recorded. No intake/output data recorded. Patient Vitals for the past 8 hrs: 
 BP Temp Pulse Resp SpO2 Height Weight 04/20/21 1820 121/73  74  98 %    
04/20/21 1628 (!) 123/57  76 20 98 %    
04/20/21 1539 (!) 115/48  81 20 98 %    
04/20/21 1515     98 %    
04/20/21 1447 122/72 98.2 °F (36.8 °C) 88 18 94 % 5' 8\" (1.727 m) 40.4 kg (89 lb) Physical Exam 
Vitals signs reviewed. Constitutional:   
   General: She is not in acute distress. Appearance: She is ill-appearing. She is not toxic-appearing or diaphoretic. HENT:  
   Head: Normocephalic and atraumatic. Nose: Nose normal. No congestion or rhinorrhea. Mouth/Throat:  
   Mouth: Mucous membranes are moist.  
   Pharynx: Oropharynx is clear. No oropharyngeal exudate or posterior oropharyngeal erythema. Eyes:  
   General: No scleral icterus. Right eye: No discharge. Left eye: No discharge. Extraocular Movements: Extraocular movements intact. Conjunctiva/sclera: Conjunctivae normal.  
   Pupils: Pupils are equal, round, and reactive to light. Neck: Musculoskeletal: Normal range of motion and neck supple. No neck rigidity or muscular tenderness. Vascular: No carotid bruit. Cardiovascular:  
   Rate and Rhythm: Normal rate and regular rhythm. Pulses: Normal pulses. Heart sounds: Normal heart sounds. No murmur. No friction rub. No gallop. Pulmonary:  
   Effort: Pulmonary effort is normal. No respiratory distress. Breath sounds: Normal breath sounds. No stridor. No wheezing, rhonchi or rales. Chest:  
   Chest wall: No tenderness. Abdominal:  
   General: Abdomen is flat. Bowel sounds are normal. There is no distension. Palpations: Abdomen is soft. There is no mass. Tenderness: There is no abdominal tenderness. There is no right CVA tenderness, left CVA tenderness, guarding or rebound.   
   Hernia: No hernia is present. Musculoskeletal: Normal range of motion. General: No swelling, tenderness, deformity or signs of injury. Right lower leg: No edema. Left lower leg: No edema. Lymphadenopathy:  
   Cervical: No cervical adenopathy. Skin: 
   General: Skin is warm. Capillary Refill: Capillary refill takes less than 2 seconds. Coloration: Skin is not jaundiced or pale. Findings: No bruising, erythema, lesion or rash. Neurological:  
   General: No focal deficit present. Mental Status: She is oriented to person, place, and time. Mental status is at baseline. Cranial Nerves: No cranial nerve deficit. Sensory: No sensory deficit. Motor: Weakness present. Coordination: Coordination normal.  
   Gait: Gait normal.  
   Deep Tendon Reflexes: Reflexes normal.  
Psychiatric:     
   Mood and Affect: Mood normal.     
   Behavior: Behavior normal.     
   Thought Content: Thought content normal.     
   Judgment: Judgment normal.  
 
  
 
Labs:   
Recent Results (from the past 24 hour(s)) EKG, 12 LEAD, INITIAL Collection Time: 04/20/21  2:51 PM  
Result Value Ref Range Ventricular Rate 88 BPM  
 Atrial Rate 88 BPM  
 P-R Interval 154 ms QRS Duration 70 ms Q-T Interval 340 ms QTC Calculation (Bezet) 411 ms Calculated P Axis 76 degrees Calculated R Axis -9 degrees Calculated T Axis 45 degrees Diagnosis Normal sinus rhythm Possible Left atrial enlargement Anteroseptal infarct , age undetermined Abnormal ECG No previous ECGs available Confirmed by Manuel Butts [phone]) on 4/20/2021 3:20:36 PM 
  
BNP Collection Time: 04/20/21  3:00 PM  
Result Value Ref Range NT pro- (H) <450 pg/mL CBC WITH AUTOMATED DIFF Collection Time: 04/20/21  3:50 PM  
Result Value Ref Range WBC 16.7 (H) 3.6 - 11.0 K/uL  
 RBC 3.55 (L) 3.80 - 5.20 M/uL HGB 8.9 (L) 11.5 - 16.0 g/dL HCT 28.9 (L) 35.0 - 47.0 %  MCV 81.4 80.0 - 99.0 FL  
 MCH 25.1 (L) 26.0 - 34.0 PG  
 MCHC 30.8 30.0 - 36.5 g/dL  
 RDW 17.4 (H) 11.5 - 14.5 % PLATELET 931 (H) 564 - 400 K/uL MPV 8.7 (L) 8.9 - 12.9 FL  
 NEUTROPHILS 91 (H) 32 - 75 % LYMPHOCYTES 5 (L) 12 - 49 % MONOCYTES 4 (L) 5 - 13 % EOSINOPHILS 0 0 - 7 % BASOPHILS 0 0 - 1 % IMMATURE GRANULOCYTES 0 0.0 - 0.5 % ABS. NEUTROPHILS 15.2 (H) 1.8 - 8.0 K/UL  
 ABS. LYMPHOCYTES 0.9 0.8 - 3.5 K/UL  
 ABS. MONOCYTES 0.7 0.0 - 1.0 K/UL  
 ABS. EOSINOPHILS 0.0 0.0 - 0.4 K/UL  
 ABS. BASOPHILS 0.0 0.0 - 0.1 K/UL  
 ABS. IMM. GRANS. 0.0 0.00 - 0.04 K/UL  
 DF AUTOMATED METABOLIC PANEL, COMPREHENSIVE Collection Time: 04/20/21  3:50 PM  
Result Value Ref Range Sodium 139 136 - 145 mmol/L Potassium 3.4 (L) 3.5 - 5.1 mmol/L Chloride 104 97 - 108 mmol/L  
 CO2 29 21 - 32 mmol/L Anion gap 6 5 - 15 mmol/L Glucose 93 65 - 100 mg/dL BUN 14 6 - 20 mg/dL Creatinine 0.54 (L) 0.55 - 1.02 mg/dL BUN/Creatinine ratio 26 (H) 12 - 20 GFR est AA >60 >60 ml/min/1.73m2 GFR est non-AA >60 >60 ml/min/1.73m2 Calcium 10.0 8.5 - 10.1 mg/dL Bilirubin, total 0.4 0.2 - 1.0 mg/dL AST (SGOT) 8 (L) 15 - 37 U/L  
 ALT (SGPT) 10 (L) 12 - 78 U/L Alk. phosphatase 121 (H) 45 - 117 U/L Protein, total 7.0 6.4 - 8.2 g/dL Albumin 2.6 (L) 3.5 - 5.0 g/dL Globulin 4.4 (H) 2.0 - 4.0 g/dL A-G Ratio 0.6 (L) 1.1 - 2.2 CK W/ REFLX CKMB Collection Time: 04/20/21  3:50 PM  
Result Value Ref Range CK 23.0 (L) 26 - 192 ng/mL TROPONIN I Collection Time: 04/20/21  3:50 PM  
Result Value Ref Range Troponin-I, Qt. <0.05 <0.05 ng/mL PROTHROMBIN TIME + INR Collection Time: 04/20/21  3:50 PM  
Result Value Ref Range Prothrombin time 14.2 11.9 - 14.7 sec INR 1.1 0.9 - 1.1 PTT Collection Time: 04/20/21  3:50 PM  
Result Value Ref Range aPTT 31.9 21.2 - 34.1 sec  
 aPTT, therapeutic range   82 - 109 sec TSH 3RD GENERATION  Collection Time: 04/20/21  3:50 PM  
Result Value Ref Range TSH 1.99 0.36 - 3.74 uIU/mL  
 
 
ECG: unchanged from previous tracings CT chest:IMPRESSION 1. Large mass centered at the interface between the right lower lobe and the 
mediastinum, as detailed above, highly suspicious for neoplasm with leading 
differential consideration of lung cancer. 2. The mass abuts several mediastinal structures, including the esophagus and 
the heart with direct invasion difficult to exclude on this exam. 
3. T8 compression fracture of unknown chronicity. The mass also abuts the 
vertebral body making a pathologic fracture possible. 4. Erosion of the anterior right lateral aspect of the T9 vertebral body which 
is suspicious for direct invasion by the mass. 5. Enlarged right hilar lymph node, suspicious for disease involvement. 6. Indeterminate 4 mm right middle lobe solid nodule. Assessment: 
Active Problems: 
  Lung mass (4/20/2021) Pneumonia (4/20/2021) Plan: 
 
Lung mass/pneumoniapatient presents with above-mentioned symptomatology was found to have a significant lung mass between the right lower lobe as well as mediastinotomy concerning for malignancy given unexpected weight loss as well as possible con commitment infection with evidence of leukocytosis Obtain blood cultures Obtain sputum cultures Influenza swab Rapid COVID-19 testing Obtain type and screen Obtain coagulation panel 
N.p.o. after midnight Zosyn for antibiotic coverage Pulmonology consult for evaluation Oncology consult further evaluation Anemiapatient presents with above-mentioned symptomatology and was being worked up for anemia as an outpatient with concerns for possible GI bleeding, patient was scheduled to have an outpatient endoscopy Maintain active type and screen Continue to trend hemoglobin and hematocrit Obtain hematology consult to follow patient during the course of this admission Obtain gastroenterology consult Hypokalemiareplete potassium and recheck potassium Oral thrushof note patient has evidence of oral thrush upon physical examination Initiate nystatin 4 times daily Continue to monitor ProphylaxisSCDs, currently holding off on Lovenox given concern for possible GI bleed FENcardiac diet, n.p.o. after midnight, replete potassium and magnesium Full code, will clarify about surrogate decision-maker, admitted for further management Signed: Aleena Sierra MD 4/20/2021

## 2021-04-20 NOTE — ED TRIAGE NOTES
Reports started in December with back pain, pt now complaining of shortness of breath, generally feeling unwell. Pt has hx of anemia. Pt also states she is now having difficulty swallowing. Pt reports significant weight loss since Christmas.

## 2021-04-21 NOTE — ROUTINE PROCESS
TRANSFER - OUT REPORT:    Verbal report given to Columba Mara on Philippe Furnish  being transferred to  for routine progression of care       Report consisted of patients Situation, Background, Assessment and   Recommendations(SBAR). Information from the following report(s) SBAR, ED Summary, Intake/Output, MAR and Recent Results was reviewed with the receiving nurse. Lines:   Peripheral IV 04/20/21 (Active)        Opportunity for questions and clarification was provided.       Patient transported with:   Registered Nurse

## 2021-04-21 NOTE — PROGRESS NOTES
Two person skin assessment done with Uzma Navarro. Bruise noted to right hand. Skin otherwise in tact.

## 2021-04-21 NOTE — CONSULTS
Hematology and Oncology Inpatient Consult Note Patient: Thelma Tsang MRN: [id number]  SSN: [id number] YOB: 1945  Age: 76 y.o. Sex: female Chief Complaint: Patient was admitted with increasing dyspnea and back pain Reason for consult: Evaluation and management of patient with lung mass Subjective:  
  
Thelma Tsang is a 76 y.o. white female who came in with complaint of dyspnea and back pain. She has also developed dysphagia and some shoulder pain. Patient has history of longstanding smoking. Patient is not able to walk much. She is not moving much and has lost some weight. She had a further evaluation and was found to have a right lung mass and I am asked to see her for further hematologic and oncologic evaluation. Patient also has anemia. Past Medical History:  
Diagnosis Date  Anemia  Thrombocytosis ([address] Past Surgical History:  
Procedure Laterality Date  HX GI    
 hemrrhoid Family History Problem Relation Age of Onset  Cancer Mother   
     colon  Alzheimer Mother  Cancer Father   
     lung Social History Tobacco Use  Smoking status: Current Every Day Smoker Packs/day: 1.00 Types: Cigarettes  Smokeless tobacco: Never Used Substance Use Topics  Alcohol use: Not Currently Current Facility-Administered Medications Medication Dose Route Frequency Provider Last Rate Last Admin  morphine injection 2 mg  2 mg IntraVENous Q6H PRN Susan Holloway MD   2 mg at 04/21/21 8708  morphine injection 4 mg  4 mg IntraVENous Q6H PRN Susan Holloway MD      
 nystatin (MYCOSTATIN) 100,000 unit/mL oral suspension 500,000 Units  500,000 Units Oral QID Onel Melo MD   500,000 Units at 04/20/21 2328  multivitamin, tx-iron-ca-min (THERA-M w/ IRON) tablet 1 Tab  1 Tab Oral DAILY Onel Melo MD      
 lidocaine (XYLOCAINE) 4 % cream   Topical BID Nisha Valentin Piedad Harada, MD   Given at 04/20/21 2328  cyclobenzaprine (FLEXERIL) tablet 10 mg  10 mg Oral TID Lucrecia Benedict MD      
 sodium chloride (NS) flush 5-40 mL  5-40 mL IntraVENous Q8H Kaylee Joyner MD   10 mL at 04/21/21 7927  sodium chloride (NS) flush 5-40 mL  5-40 mL IntraVENous PRN Kaylee Joyner MD      
 acetaminophen (TYLENOL) tablet 650 mg  650 mg Oral Q6H PRN Kaylee Rice MD      
 Or  
 acetaminophen (TYLENOL) suppository 650 mg  650 mg Rectal Q6H PRN Lucrecia Benedict MD      
 polyethylene glycol [facility]) packet 17 g  17 g Oral DAILY PRN Kaylee Joyner MD      
 [place]) tablet 12.5 mg  12.5 mg Oral Q6H PRN Lucrecia Benedict MD      
 Or  
 ondansetron [facility]) injection 4 mg  4 mg IntraVENous Q6H PRN Lucrecia Benedict MD      
 piperacillin-tazobactam (ZOSYN) 3.375 g in 0.9% sodium chloride (MBP/ADV) 100 mL MBP  3.375 g IntraVENous Kaylee Scott  mL/hr at 04/21/21 0339 3.375 g at 04/21/21 9149 No Known Allergies Review of Systems: 
CONSTITUTIONAL: No fever. No repeated infections. No night sweats. Patient has chills and also feeling tired. Patient is not able to walk much and she is not much ambulatory. HEENT: No mouth sores. No Epistaxis. She does have hearing impairment. No change in taste or smell sensations. CARDIOVASCULAR: No  palpitations or chest pain. She does have on and off leg swelling. No syncope. RESPIRATORY: No cough. Patient has dyspnea and dyspnea on exertion. No Hemoptysis. No wheezing. No hoarseness of voice. GI: No nausea or vomiting. No diarrhea, constipation, no bright red blood per rectum. No hematemesis or melena. Patient's appetite is poor and she has lost some weight. No dysphagia. : No dysuria, no hematuria. No frequency of urination. INTEGUMENTARY: No skin rash or palpable lumps or bumps.  
HEMATOLOGIC: No history of easy bruisability. No gingival bleeding NEURO: No focal weakness, No paresthesia. No headache or seizures. MUSCULOSKELETAL: No back pain. ENDOCRINE:  
PSYCHIATRIC: 
Objective:  
 
Vitals:  
 04/20/21 2000 04/21/21 0011 04/21/21 0112 04/21/21 6678 BP: (!) 119/59 (!) 101/49  129/63 Pulse: 85 78  91 Resp: 19 18  18 Temp:  98.6 °F (37 °C)  98.2 °F (36.8 °C) SpO2: 98% 96% 96% 95% Weight:      
Height:      
  
 
Physical Exam: 
Constitutional: Elderly white female looks chronically ill. Not in any acute distress or pain. Eyes: Sclerae anicteric. Conjunctivae shows pallor. ENMT: Has mask on. Patient has bitemporal wasting. She also has slight puffiness around her eyes. Neck: No adenopathy, JVD or thyromegaly. Hematologic/Lymphatic: Bilateral axillary/inguinal regions showed no adenopathy. Respiratory: Lungs are clear bilaterally. Cardiovascular: Normal sinus rhythm; no gallop or murmur; peripheral pulses are palpable. Abdomen: Scaphoid abdomen. Soft, nontender, no hepatosplenomegaly. No guarding or rigidity. Bowel sounds present. Back/Spine: No spinal tenderness; no costovertebral angle tenderness. Extremities: She does have bilateral lower extremity swelling. Skin: No petechiae; no skin rash. Neurologic: Alert/oriented x 3; no focal neurological deficits. Recent Results (from the past 24 hour(s)) EKG, 12 LEAD, INITIAL Collection Time: 04/20/21  2:51 PM  
Result Value Ref Range Ventricular Rate 88 BPM  
 Atrial Rate 88 BPM  
 P-R Interval 154 ms QRS Duration 70 ms Q-T Interval 340 ms QTC Calculation (Bezet) 411 ms Calculated P Axis 76 degrees Calculated R Axis -9 degrees Calculated T Axis 45 degrees Diagnosis Normal sinus rhythm Possible Left atrial enlargement Anteroseptal infarct , age undetermined Abnormal ECG No previous ECGs available Confirmed by John Madden [phone]) on 4/20/2021 3:20:36 PM 
  
BNP  Collection Time: 04/20/21 3:00 PM  
Result Value Ref Range NT pro- (H) <450 pg/mL CBC WITH AUTOMATED DIFF Collection Time: 04/20/21  3:50 PM  
Result Value Ref Range WBC 16.7 (H) 3.6 - 11.0 K/uL  
 RBC 3.55 (L) 3.80 - 5.20 M/uL HGB 8.9 (L) 11.5 - 16.0 g/dL HCT 28.9 (L) 35.0 - 47.0 % MCV 81.4 80.0 - 99.0 FL  
 MCH 25.1 (L) 26.0 - 34.0 PG  
 MCHC 30.8 30.0 - 36.5 g/dL  
 RDW 17.4 (H) 11.5 - 14.5 % PLATELET 029 (H) 221 - 400 K/uL MPV 8.7 (L) 8.9 - 12.9 FL  
 NEUTROPHILS 91 (H) 32 - 75 % LYMPHOCYTES 5 (L) 12 - 49 % MONOCYTES 4 (L) 5 - 13 % EOSINOPHILS 0 0 - 7 % BASOPHILS 0 0 - 1 % IMMATURE GRANULOCYTES 0 0.0 - 0.5 % ABS. NEUTROPHILS 15.2 (H) 1.8 - 8.0 K/UL  
 ABS. LYMPHOCYTES 0.9 0.8 - 3.5 K/UL  
 ABS. MONOCYTES 0.7 0.0 - 1.0 K/UL  
 ABS. EOSINOPHILS 0.0 0.0 - 0.4 K/UL  
 ABS. BASOPHILS 0.0 0.0 - 0.1 K/UL  
 ABS. IMM. GRANS. 0.0 0.00 - 0.04 K/UL  
 DF AUTOMATED METABOLIC PANEL, COMPREHENSIVE Collection Time: 04/20/21  3:50 PM  
Result Value Ref Range Sodium 139 136 - 145 mmol/L Potassium 3.4 (L) 3.5 - 5.1 mmol/L Chloride 104 97 - 108 mmol/L  
 CO2 29 21 - 32 mmol/L Anion gap 6 5 - 15 mmol/L Glucose 93 65 - 100 mg/dL BUN 14 6 - 20 mg/dL Creatinine 0.54 (L) 0.55 - 1.02 mg/dL BUN/Creatinine ratio 26 (H) 12 - 20 GFR est AA >60 >60 ml/min/1.73m2 GFR est non-AA >60 >60 ml/min/1.73m2 Calcium 10.0 8.5 - 10.1 mg/dL Bilirubin, total 0.4 0.2 - 1.0 mg/dL AST (SGOT) 8 (L) 15 - 37 U/L  
 ALT (SGPT) 10 (L) 12 - 78 U/L Alk. phosphatase 121 (H) 45 - 117 U/L Protein, total 7.0 6.4 - 8.2 g/dL Albumin 2.6 (L) 3.5 - 5.0 g/dL Globulin 4.4 (H) 2.0 - 4.0 g/dL A-G Ratio 0.6 (L) 1.1 - 2.2 CK W/ REFLX CKMB Collection Time: 04/20/21  3:50 PM  
Result Value Ref Range CK 23.0 (L) 26 - 192 ng/mL TROPONIN I Collection Time: 04/20/21  3:50 PM  
Result Value Ref Range Troponin-I, Qt. <0.05 <0.05 ng/mL PROTHROMBIN TIME + INR  Collection Time: 04/20/21  3:50 PM  
Result Value Ref Range Prothrombin time 14.2 11.9 - 14.7 sec INR 1.1 0.9 - 1.1 PTT Collection Time: 04/20/21  3:50 PM  
Result Value Ref Range aPTT 31.9 21.2 - 34.1 sec  
 aPTT, therapeutic range   82 - 109 sec TSH 3RD GENERATION Collection Time: 04/20/21  3:50 PM  
Result Value Ref Range TSH 1.99 0.36 - 3.74 uIU/mL COVID-19 RAPID TEST Collection Time: 04/20/21  6:30 PM  
Result Value Ref Range Specimen source Nasopharyngeal    
 COVID-19 rapid test Not Detected Not Detected SARS-COV-2 Collection Time: 04/20/21  6:30 PM  
Result Value Ref Range SARS-CoV-2 Please find results under separate order INFLUENZA A & B AG (RAPID TEST) Collection Time: 04/20/21  7:36 PM  
Result Value Ref Range Influenza A Antigen Negative Negative Influenza B Antigen Negative Negative URINALYSIS W/ REFLEX CULTURE Collection Time: 04/20/21 11:18 PM  
 Specimen: Urine Result Value Ref Range Color Yellow/Straw Appearance Turbid (A) Clear Specific gravity PENDING pH (UA) 5.0 5.0 - 8.0 Protein Negative Negative mg/dL Glucose Negative Negative mg/dL Ketone Negative Negative mg/dL Bilirubin Negative Negative Blood Small (A) Negative Urobilinogen 0.1 0.1 - 1.0 EU/dL Nitrites Positive (A) Negative Leukocyte Esterase Large (A) Negative UA:UC IF INDICATED PENDING   
 WBC 10-20 0 - 4 /hpf  
 RBC 20-50 0 - 5 /hpf Bacteria 1+ (A) Negative /hpf Mucus Trace /lpf METABOLIC PANEL, COMPREHENSIVE Collection Time: 04/21/21  3:08 AM  
Result Value Ref Range Sodium 140 136 - 145 mmol/L Potassium 3.8 3.5 - 5.1 mmol/L Chloride 107 97 - 108 mmol/L  
 CO2 29 21 - 32 mmol/L Anion gap 4 (L) 5 - 15 mmol/L Glucose 83 65 - 100 mg/dL BUN 15 6 - 20 mg/dL Creatinine 0.43 (L) 0.55 - 1.02 mg/dL BUN/Creatinine ratio 35 (H) 12 - 20 GFR est AA >60 >60 ml/min/1.73m2  GFR est non-AA >60 >60 ml/min/1.73m2 Calcium 10.2 (H) 8.5 - 10.1 mg/dL Bilirubin, total 0.3 0.2 - 1.0 mg/dL AST (SGOT) 6 (L) 15 - 37 U/L  
 ALT (SGPT) 11 (L) 12 - 78 U/L Alk. phosphatase 105 45 - 117 U/L Protein, total 6.5 6.4 - 8.2 g/dL Albumin 2.3 (L) 3.5 - 5.0 g/dL Globulin 4.2 (H) 2.0 - 4.0 g/dL A-G Ratio 0.5 (L) 1.1 - 2.2    
CBC WITH AUTOMATED DIFF Collection Time: 04/21/21  3:08 AM  
Result Value Ref Range WBC 16.6 (H) 3.6 - 11.0 K/uL  
 RBC 3.36 (L) 3.80 - 5.20 M/uL HGB 8.3 (L) 11.5 - 16.0 g/dL HCT 27.9 (L) 35.0 - 47.0 % MCV 83.0 80.0 - 99.0 FL  
 MCH 24.7 (L) 26.0 - 34.0 PG  
 MCHC 29.7 (L) 30.0 - 36.5 g/dL  
 RDW 17.4 (H) 11.5 - 14.5 % PLATELET 697 (H) 984 - 400 K/uL MPV 8.5 (L) 8.9 - 12.9 FL  
 NEUTROPHILS 86 (H) 32 - 75 % LYMPHOCYTES 8 (L) 12 - 49 % MONOCYTES 6 5 - 13 % EOSINOPHILS 0 0 - 7 % BASOPHILS 0 0 - 1 % IMMATURE GRANULOCYTES 0 0.0 - 0.5 % ABS. NEUTROPHILS 14.2 (H) 1.8 - 8.0 K/UL  
 ABS. LYMPHOCYTES 1.3 0.8 - 3.5 K/UL  
 ABS. MONOCYTES 1.0 0.0 - 1.0 K/UL  
 ABS. EOSINOPHILS 0.0 0.0 - 0.4 K/UL  
 ABS. BASOPHILS 0.0 0.0 - 0.1 K/UL  
 ABS. IMM. GRANS. 0.1 (H) 0.00 - 0.04 K/UL  
 DF AUTOMATED    
  
 
CT NECK SOFT TISSUE W CONT Final Result 1.  2.3 x 1.7 x 3.6 cm enhancing mass in the posterior right parotid gland. Smaller soft tissue nodules are present in the inferior left parotid gland with  
an additional nodule suspected in the right parotid gland anterior to the  
dominant mass. Overall, in a patient with suspected lung malignancy findings are  
worrisome for metastatic intraparotid adenopathy. Correlation with PET/CT may be  
helpful as clinically appropriate. 2.  Chronic right maxillary sinusitis. CT CHEST W CONT Final Result 1. Large mass centered at the interface between the right lower lobe and the  
mediastinum, as detailed above, highly suspicious for neoplasm with leading  
differential consideration of lung cancer.   
2. The mass abuts several mediastinal structures, including the esophagus and  
the heart with direct invasion difficult to exclude on this exam.  
3. T8 compression fracture of unknown chronicity. The mass also abuts the  
vertebral body making a pathologic fracture possible. 4. Erosion of the anterior right lateral aspect of the T9 vertebral body which  
is suspicious for direct invasion by the mass. 5. Enlarged right hilar lymph node, suspicious for disease involvement. 6. Indeterminate 4 mm right middle lobe solid nodule. XR CHEST PORT Final Result 1. Right hilar fullness and increased density with suspicion for potential right  
hilar mass. Recommend further evaluation with contrast enhanced chest CT. 2. See above for additional comments. The above was discussed with and acknowledged by Dr. Gregory Soriano by telephone  
on  4/20/2021 4:09 PM. Assessment:  
 
Hospital Problems  Date Reviewed: 3/17/2021 Codes Class Noted POA Lung mass ICD-10-CM: R91.8 ICD-9-CM: 786.6  4/20/2021 Unknown Pneumonia ICD-10-CM: J18.9 ICD-9-CM: 262  4/20/2021 Unknown Assessment & Plan:  
 
44-year-old white female who has shortness of breath dysphagia and weight loss. Patient had CT of chest which shows right hilar mass with hilar adenopathy and possible T8 vertebral erosion. Obviously this is malignancy unless proven otherwise. Patient has been evaluated by pulmonary and patient is going for further diagnostic procedure. Patient is also found to have Candida esophagitis. -Patient has lost lots of weight lately which is a bad prognostic sign for her possible lung cancer. 
-Based on surgical pathology will make further recommendations. 
-Patient has leukocytosis and thrombocytosis both are reactive possibly from malignancy. She had hematologic work-up and it was negative for any myeloproliferative disorders. 
-We will continue to follow this patient with you.  
 
This dictation was done by dragon, computer voice recognition software. Often unanticipated grammatical, syntax, phones and other interpretive errors are inadvertently transcribed. Please excuse errors that have escaped final proofreading. Signed By: Fahad Chi MD   
 April 21, 2021

## 2021-04-21 NOTE — CONSULTS
Gastroenterology Consult Referring Physician: Elena Barksdale DO Consult Date: 4/21/2021 Subjective: Chief Complaint: shortness of breath, generalize weakness, feeling unwell. History of Present Illness: Thelma Tsang is a 76 y.o. female who is seen in consultation for anemia. Patient was admitted to the hospital with complaints of shortness of breath, generalized weakness, and lost of weight since December. Patient's symptoms started in December when she noticed that she is gradually loosing weight, worsening generalized weakness and shortness of breath. She denies any fever, chills, nausea or vomiting. She also reported of dark liquid stools. Denies taking any blood thinners. She states she was well before all these symptoms. She denies personal history of cancer. She appears cachexic. CT chest impression: 1. Large mass centered at the interface between the right lower lobe and the mediastinum, as detailed above, highly suspicious for neoplasm with leading differential consideration of lung cancer. 2. The mass abuts several mediastinal structures, including the esophagus and the heart with direct invasion difficult to exclude on this exam. 
3. T8 compression fracture of unknown chronicity. The mass also abuts the vertebral body making a pathologic fracture possible. 4. Erosion of the anterior right lateral aspect of the T9 vertebral body which is suspicious for direct invasion by the mass. 5. Enlarged right hilar lymph node, suspicious for disease involvement. 6. Indeterminate 4 mm right middle lobe solid nodule. CT neck Impression:  
1.  2.3 x 1.7 x 3.6 cm enhancing mass in the posterior right parotid gland. Smaller soft tissue nodules are present in the inferior left parotid gland with an additional nodule suspected in the right parotid gland anterior to thedominant mass.  Overall, in a patient with suspected lung malignancy findings are 
worrisome for metastatic intraparotid adenopathy. Correlation with PET/CT may be helpful as clinically appropriate. 2.  Chronic right maxillary sinusitis. 
 
-H&H 8.3 and 27.9. WBC 16.6 Past Medical History:  
Diagnosis Date  Anemia  Thrombocytosis ([address] Past Surgical History:  
Procedure Laterality Date  HX GI    
 hemrrhoid Family History Problem Relation Age of Onset  Cancer Mother   
     colon  Alzheimer Mother  Cancer Father   
     lung Social History Tobacco Use  Smoking status: Current Every Day Smoker Packs/day: 1.00 Types: Cigarettes  Smokeless tobacco: Never Used Substance Use Topics  Alcohol use: Not Currently No Known Allergies Current Facility-Administered Medications Medication Dose Route Frequency  morphine injection 2 mg  2 mg IntraVENous Q6H PRN  
 HYDROmorphone (DILAUDID) syringe 0.5 mg  0.5 mg IntraVENous Q4H PRN  
 albuterol-ipratropium (DUO-NEB) 2.5 MG-0.5 MG/3 ML  3 mL Nebulization Q4H PRN  
 melatonin tablet 6 mg  6 mg Oral QHS  nystatin (MYCOSTATIN) 100,000 unit/mL oral suspension 500,000 Units  500,000 Units Oral QID  multivitamin, tx-iron-ca-min (THERA-M w/ IRON) tablet 1 Tab  1 Tab Oral DAILY  lidocaine (XYLOCAINE) 4 % cream   Topical BID  cyclobenzaprine (FLEXERIL) tablet 10 mg  10 mg Oral TID  sodium chloride (NS) flush 5-40 mL  5-40 mL IntraVENous Q8H  
 sodium chloride (NS) flush 5-40 mL  5-40 mL IntraVENous PRN  
 acetaminophen (TYLENOL) tablet 650 mg  650 mg Oral Q6H PRN Or  
 acetaminophen (TYLENOL) suppository 650 mg  650 mg Rectal Q6H PRN  polyethylene glycol (MIRALAX) packet 17 g  17 g Oral DAILY PRN  promethazine (PHENERGAN) tablet 12.5 mg  12.5 mg Oral Q6H PRN Or  
 ondansetron (ZOFRAN) injection 4 mg  4 mg IntraVENous Q6H PRN  piperacillin-tazobactam (ZOSYN) 3.375 g in 0.9% sodium chloride (MBP/ADV) 100 mL MBP  3.375 g IntraVENous Q8H Review of Systems: A detailed 10 organ review of systems is obtained with pertinent positives as listed in the History of Present Illness and Past Medical History. All others are negative. Objective:  
 
Physical Exam: 
Visit Vitals BP (!) 126/59 Pulse 92 Temp 98.7 °F (37.1 °C) Resp 18 Ht 5' 8\" (1.727 m) Wt 39.7 kg (87 lb 7.7 oz) SpO2 97% Breastfeeding No  
BMI 13.30 kg/m² Skin:  Extremities and face reveal no rashes. No callaway erythema. No telangiectasias on the chest wall. HEENT: Sclerae anicteric. Extra-occular muscles are intact. No oral ulcers. No abnormal pigmentation of the lips. The neck is supple. Cardiovascular: Regular rate and rhythm. No murmurs, gallops, or rubs. PMI nondisplaced. Carotids without bruits. Respiratory:  Comfortable breathing with no accessory muscle use. Clear breath sounds with no wheezes, rales, or rhonchi. GI:  Abdomen nondistended, soft, and nontender. Normal active bowel sounds. No enlargement of the liver or spleen. No masses palpable. Rectal:  Deferred Musculoskeletal:  No pitting edema of the lower legs. Extremities have good range of motion. No costovertebral tenderness. Neurological:  Gross memory appears intact. Patient is alert and oriented. Psychiatric:  Mood appears appropriate with judgement intact. Lymphatic:  No cervical or supraclavicular adenopathy. Lab/Data Review: 
Recent Results (from the past 24 hour(s)) CBC WITH AUTOMATED DIFF Collection Time: 04/20/21  3:50 PM  
Result Value Ref Range WBC 16.7 (H) 3.6 - 11.0 K/uL  
 RBC 3.55 (L) 3.80 - 5.20 M/uL HGB 8.9 (L) 11.5 - 16.0 g/dL HCT 28.9 (L) 35.0 - 47.0 % MCV 81.4 80.0 - 99.0 FL  
 MCH 25.1 (L) 26.0 - 34.0 PG  
 MCHC 30.8 30.0 - 36.5 g/dL  
 RDW 17.4 (H) 11.5 - 14.5 % PLATELET 325 (H) 715 - 400 K/uL MPV 8.7 (L) 8.9 - 12.9 FL  
 NEUTROPHILS 91 (H) 32 - 75 % LYMPHOCYTES 5 (L) 12 - 49 % MONOCYTES 4 (L) 5 - 13 % EOSINOPHILS 0 0 - 7 % BASOPHILS 0 0 - 1 % IMMATURE GRANULOCYTES 0 0.0 - 0.5 % ABS. NEUTROPHILS 15.2 (H) 1.8 - 8.0 K/UL  
 ABS. LYMPHOCYTES 0.9 0.8 - 3.5 K/UL  
 ABS. MONOCYTES 0.7 0.0 - 1.0 K/UL  
 ABS. EOSINOPHILS 0.0 0.0 - 0.4 K/UL  
 ABS. BASOPHILS 0.0 0.0 - 0.1 K/UL  
 ABS. IMM. GRANS. 0.0 0.00 - 0.04 K/UL  
 DF AUTOMATED METABOLIC PANEL, COMPREHENSIVE Collection Time: 04/20/21  3:50 PM  
Result Value Ref Range Sodium 139 136 - 145 mmol/L Potassium 3.4 (L) 3.5 - 5.1 mmol/L Chloride 104 97 - 108 mmol/L  
 CO2 29 21 - 32 mmol/L Anion gap 6 5 - 15 mmol/L Glucose 93 65 - 100 mg/dL BUN 14 6 - 20 mg/dL Creatinine 0.54 (L) 0.55 - 1.02 mg/dL BUN/Creatinine ratio 26 (H) 12 - 20 GFR est AA >60 >60 ml/min/1.73m2 GFR est non-AA >60 >60 ml/min/1.73m2 Calcium 10.0 8.5 - 10.1 mg/dL Bilirubin, total 0.4 0.2 - 1.0 mg/dL AST (SGOT) 8 (L) 15 - 37 U/L  
 ALT (SGPT) 10 (L) 12 - 78 U/L Alk. phosphatase 121 (H) 45 - 117 U/L Protein, total 7.0 6.4 - 8.2 g/dL Albumin 2.6 (L) 3.5 - 5.0 g/dL Globulin 4.4 (H) 2.0 - 4.0 g/dL A-G Ratio 0.6 (L) 1.1 - 2.2 CK W/ REFLX CKMB Collection Time: 04/20/21  3:50 PM  
Result Value Ref Range CK 23.0 (L) 26 - 192 ng/mL TROPONIN I Collection Time: 04/20/21  3:50 PM  
Result Value Ref Range Troponin-I, Qt. <0.05 <0.05 ng/mL PROTHROMBIN TIME + INR Collection Time: 04/20/21  3:50 PM  
Result Value Ref Range Prothrombin time 14.2 11.9 - 14.7 sec INR 1.1 0.9 - 1.1 PTT Collection Time: 04/20/21  3:50 PM  
Result Value Ref Range aPTT 31.9 21.2 - 34.1 sec  
 aPTT, therapeutic range   82 - 109 sec TSH 3RD GENERATION Collection Time: 04/20/21  3:50 PM  
Result Value Ref Range TSH 1.99 0.36 - 3.74 uIU/mL COVID-19 RAPID TEST Collection Time: 04/20/21  6:30 PM  
Result Value Ref Range Specimen source Nasopharyngeal    
 COVID-19 rapid test Not Detected Not Detected SARS-COV-2 Collection Time: 04/20/21  6:30 PM  
Result Value Ref Range  SARS-CoV-2 Please find results under separate order CULTURE, BLOOD, PAIRED Collection Time: 04/20/21  6:55 PM  
 Specimen: Blood Result Value Ref Range Special Requests: No Special Requests Culture result: No growth after 3 hours INFLUENZA A & B AG (RAPID TEST) Collection Time: 04/20/21  7:36 PM  
Result Value Ref Range Influenza A Antigen Negative Negative Influenza B Antigen Negative Negative URINALYSIS W/ REFLEX CULTURE Collection Time: 04/20/21 11:18 PM  
 Specimen: Urine Result Value Ref Range Color Yellow/Straw Appearance Turbid (A) Clear Specific gravity PENDING pH (UA) 5.0 5.0 - 8.0 Protein Negative Negative mg/dL Glucose Negative Negative mg/dL Ketone Negative Negative mg/dL Bilirubin Negative Negative Blood Small (A) Negative Urobilinogen 0.1 0.1 - 1.0 EU/dL Nitrites Positive (A) Negative Leukocyte Esterase Large (A) Negative UA:UC IF INDICATED PENDING   
 WBC 10-20 0 - 4 /hpf  
 RBC 20-50 0 - 5 /hpf Bacteria 1+ (A) Negative /hpf Mucus Trace /lpf METABOLIC PANEL, COMPREHENSIVE Collection Time: 04/21/21  3:08 AM  
Result Value Ref Range Sodium 140 136 - 145 mmol/L Potassium 3.8 3.5 - 5.1 mmol/L Chloride 107 97 - 108 mmol/L  
 CO2 29 21 - 32 mmol/L Anion gap 4 (L) 5 - 15 mmol/L Glucose 83 65 - 100 mg/dL BUN 15 6 - 20 mg/dL Creatinine 0.43 (L) 0.55 - 1.02 mg/dL BUN/Creatinine ratio 35 (H) 12 - 20 GFR est AA >60 >60 ml/min/1.73m2 GFR est non-AA >60 >60 ml/min/1.73m2 Calcium 10.2 (H) 8.5 - 10.1 mg/dL Bilirubin, total 0.3 0.2 - 1.0 mg/dL AST (SGOT) 6 (L) 15 - 37 U/L  
 ALT (SGPT) 11 (L) 12 - 78 U/L Alk. phosphatase 105 45 - 117 U/L Protein, total 6.5 6.4 - 8.2 g/dL Albumin 2.3 (L) 3.5 - 5.0 g/dL Globulin 4.2 (H) 2.0 - 4.0 g/dL A-G Ratio 0.5 (L) 1.1 - 2.2    
CBC WITH AUTOMATED DIFF Collection Time: 04/21/21  3:08 AM  
Result Value Ref Range  WBC 16.6 (H) 3.6 - 11.0 K/uL  
 RBC 3.36 (L) 3.80 - 5.20 M/uL HGB 8.3 (L) 11.5 - 16.0 g/dL HCT 27.9 (L) 35.0 - 47.0 % MCV 83.0 80.0 - 99.0 FL  
 MCH 24.7 (L) 26.0 - 34.0 PG  
 MCHC 29.7 (L) 30.0 - 36.5 g/dL  
 RDW 17.4 (H) 11.5 - 14.5 % PLATELET 545 (H) 192 - 400 K/uL MPV 8.5 (L) 8.9 - 12.9 FL  
 NEUTROPHILS 86 (H) 32 - 75 % LYMPHOCYTES 8 (L) 12 - 49 % MONOCYTES 6 5 - 13 % EOSINOPHILS 0 0 - 7 % BASOPHILS 0 0 - 1 % IMMATURE GRANULOCYTES 0 0.0 - 0.5 % ABS. NEUTROPHILS 14.2 (H) 1.8 - 8.0 K/UL  
 ABS. LYMPHOCYTES 1.3 0.8 - 3.5 K/UL  
 ABS. MONOCYTES 1.0 0.0 - 1.0 K/UL  
 ABS. EOSINOPHILS 0.0 0.0 - 0.4 K/UL  
 ABS. BASOPHILS 0.0 0.0 - 0.1 K/UL  
 ABS. IMM. GRANS. 0.1 (H) 0.00 - 0.04 K/UL  
 DF AUTOMATED    
TYPE & SCREEN Collection Time: 04/21/21  7:01 AM  
Result Value Ref Range Crossmatch Expiration 04/24/2021,2359 ABO/Rh(D) O Positive Antibody screen Negative CT NECK SOFT TISSUE W CONT Final Result 1.  2.3 x 1.7 x 3.6 cm enhancing mass in the posterior right parotid gland. Smaller soft tissue nodules are present in the inferior left parotid gland with  
an additional nodule suspected in the right parotid gland anterior to the  
dominant mass. Overall, in a patient with suspected lung malignancy findings are  
worrisome for metastatic intraparotid adenopathy. Correlation with PET/CT may be  
helpful as clinically appropriate. 2.  Chronic right maxillary sinusitis. CT CHEST W CONT Final Result 1. Large mass centered at the interface between the right lower lobe and the  
mediastinum, as detailed above, highly suspicious for neoplasm with leading  
differential consideration of lung cancer. 2. The mass abuts several mediastinal structures, including the esophagus and  
the heart with direct invasion difficult to exclude on this exam.  
3. T8 compression fracture of unknown chronicity. The mass also abuts the  
vertebral body making a pathologic fracture possible.   
4. Erosion of the anterior right lateral aspect of the T9 vertebral body which  
is suspicious for direct invasion by the mass. 5. Enlarged right hilar lymph node, suspicious for disease involvement. 6. Indeterminate 4 mm right middle lobe solid nodule. XR CHEST PORT Final Result 1. Right hilar fullness and increased density with suspicion for potential right  
hilar mass. Recommend further evaluation with contrast enhanced chest CT. 2. See above for additional comments. The above was discussed with and acknowledged by Dr. Diogo Carrasco by telephone  
on  4/20/2021 4:09 PM. Assessment/Plan: Anemia, possible GI bleed, dark stools - Plan for upper endoscopy and possibly EUS, either tomorrow after bronchoscopy or Friday. 
-Patient is schedule to have a bronchoscopy procedure tomorrow. 
-monitor H&H, transfuse as needed.  
-check cbc in am 
 
 
Active Problems: 
  Lung mass (4/20/2021) Pneumonia (4/20/2021) IP CONSULT TO HOSPITALIST 
IP CONSULT TO PULMONOLOGY 
IP CONSULT TO ONCOLOGY 
IP CONSULT TO GASTROENTEROLOGY 
IP CONSULT TO OTOLARYNGOLOGY Patient discussed with Dr Miguelito Pedersen and agrees to above impression and plan. Thank you for allowing me to participate in this patients care [facility] Referring Physician Fabián Luis DO

## 2021-04-21 NOTE — PROGRESS NOTES
Hospitalist Progress Note NAME: Rosa Elena Neal :  1945 MRN:  [id number] Subjective: Chief Complaint / Reason for Physician Visit Patient seen and evaluated at bedside, no acute events overnight, patient currently has no new complaints. Discussed with RN events overnight. Review of Systems: 
Symptom Y/N Comments  Symptom Y/N Comments Fever/Chills N   Chest Pain N Poor Appetite Y   Edema N   
Cough N   Abdominal Pain N Sputum N   Joint Pain N   
SOB/YOUNG N   Pruritis/Rash Y Nausea/vomit N   Tolerating PT/OT NA Diarrhea N   Tolerating Diet Y Constipation N   Other Could NOT obtain due to:   
Patient denies any fevers nausea vomiting lightheadedness dizziness chest pain palpitations headache focal weakness loss of sensation auditory or visual symptoms abdominal stool or urinary complaints or any other associated symptoms. Objective: VITALS:  
Last 24hrs VS reviewed since prior progress note. Most recent are: 
Patient Vitals for the past 24 hrs: 
 Temp Pulse Resp BP SpO2  
21 1004  92 18 (!) 126/59 97 % 21 0525 98.2 °F (36.8 °C) 91 18 129/63 95 % 21 0112     96 % 21 0011 98.6 °F (37 °C) 78 18 (!) 101/49 96 % 21 2000  85 19 (!) 119/59 98 % 21 1921  68 15 (!) 116/90 97 % 21 1820 98.1 °F (36.7 °C) 74  121/73 98 % 21 1628  76 20 (!) 123/57 98 % 21 1539  81 20 (!) 115/48 98 % 21 1515     98 % 21 1447 98.2 °F (36.8 °C) 88 18 122/72 94 % No intake or output data in the 24 hours ending 21 1029 PHYSICAL EXAM: 
General: Patient appears cachectic however comfortable not in any acute distress EENT:  EOMI. Anicteric sclerae. MMM Resp:  Decreased air entry bilaterally in bilateral lower lung zones CV:  Regular  rhythm, s1 + s2, no murmurs rubs or gallops  No edema GI:  Soft, Non distended, Non tender. +Bowel sounds Neurologic:  Alert and oriented X 3, normal speech, Psych:   Good insight. Not anxious nor agitated Skin:  No rashes. No jaundice Procedures: see electronic medical records for all procedures/Xrays and details which were not copied into this note but were reviewed prior to creation of Plan. LABS: 
I reviewed today's most current labs and imaging studies. Pertinent labs include: 
Recent Labs  
  04/21/21 
0308 04/20/21 
1550 WBC 16.6* 16.7* HGB 8.3* 8.9* HCT 27.9* 28.9*  
* 604* Recent Labs  
  04/21/21 
0308 04/20/21 
1550  139  
K 3.8 3.4*  
 104 CO2 29 29 GLU 83 93 BUN 15 14 CREA 0.43* 0.54* CA 10.2* 10.0 ALB 2.3* 2.6* TBILI 0.3 0.4 ALT 11* 10* INR  --  1.1 Signed: Vipin Arana MD 
 
CT neck soft tissue:IMPRESSION 
1.  2.3 x 1.7 x 3.6 cm enhancing mass in the posterior right parotid gland. Smaller soft tissue nodules are present in the inferior left parotid gland with 
an additional nodule suspected in the right parotid gland anterior to the 
dominant mass. Overall, in a patient with suspected lung malignancy findings are 
worrisome for metastatic intraparotid adenopathy. Correlation with PET/CT may be 
helpful as clinically appropriate. 2.  Chronic right maxillary sinusitis CT chest with contrast:IMPRESSION 1. Large mass centered at the interface between the right lower lobe and the 
mediastinum, as detailed above, highly suspicious for neoplasm with leading 
differential consideration of lung cancer. 2. The mass abuts several mediastinal structures, including the esophagus and 
the heart with direct invasion difficult to exclude on this exam. 
3. T8 compression fracture of unknown chronicity. The mass also abuts the 
vertebral body making a pathologic fracture possible. 4. Erosion of the anterior right lateral aspect of the T9 vertebral body which 
is suspicious for direct invasion by the mass.  
5. Enlarged right hilar lymph node, suspicious for disease involvement. 6. Indeterminate 4 mm right middle lobe solid nodule. Reviewed most current lab test results and cultures  YES Reviewed most current radiology test results   YES Review and summation of old records today    NO Reviewed patient's current orders and MAR    YES 
PMH/SH reviewed - no change compared to H&P Assessment/plan: 
 
Lung mass/pneumoniapatient presents with above-mentioned symptomatology was found to have a significant lung mass between the right lower lobe as well as mediastinotomy concerning for malignancy given unexpected weight loss as well as possible con commitment infection with evidence of leukocytosis Follow up blood cultures Follow up sputum cultures N.p.o. after midnight Zosyn for antibiotic coverage Pulmonology consult appreciated, patient likely to go for Bronchoscopy/Bx tomorrow Oncology consult for further evaluation 
  
Anemiapatient presents with above-mentioned symptomatology and was being worked up for anemia as an outpatient with concerns for possible GI bleeding, patient was scheduled to have an outpatient endoscopy Maintain active type and screen Continue to trend hemoglobin and hematocrit Obtain hematology consult to follow patient during the course of this admission Obtain gastroenterology consult 
  
Hypokalemiareplete potassium and recheck potassium 
  
Oral thrushof note patient has evidence of oral thrush upon physical examination Initiate nystatin 4 times daily Continue to monitor Urinary Tract infection - UA consistent with UTI, pt does not meet SIRS/sepsis criteria at this time Follow up blood cultures Follow up urine cultures Continue Zosyn for abx coverage Continue to monitor 
  
ProphylaxisSCDs, currently holding off on Lovenox given concern for possible GI bleed FENcardiac diet, n.p.o. after midnight, replete potassium and magnesium Full code, Disposition - pending clinical improvement, Pulmonology/Oncology/Gastroenterology evaluation/clearance________________________________________________________________________ Care Plan discussed with: 
  Comments Patient X Family  X   
RN X   
Care Manager x Consultant  x X Multidiciplinary team rounds were held today with , nursing, pharmacist and clinical coordinator. Patient's plan of care was discussed; medications were reviewed and discharge planning was addressed. ________________________________________________________________________ Total NON critical care TIME:  35  Minutes Comments >50% of visit spent in counseling and coordination of care X   
________________________________________________________________________ Sondra Wright MD

## 2021-04-21 NOTE — CONSULTS
[address] [facility] [place] Name:  Belinda Sol 
MR#:  [id number] :  1945 ACCOUNT #:  [de-identified] DATE OF SERVICE:  2021 ATTENDING PHYSICIAN:  Arley Saleh MD, hospitalist. 
 
REASON FOR CONSULTATION:  Right lung mass. HISTORY OF PRESENTING ILLNESS:  The patient is a 40-year-old  female. Information is obtained from current medical records and also from the patient herself. She gives a history of very extensive tobacco abuse starting at the age of 21 years and still smoking one pack a day. She has not been to a doctor in a long time. She has not been taking any medications at home. However, in 2020, she started having back pains. They were towards the right side of the chest.  She kept putting it off. In 2021, she saw a PCP. She was referred to a hematologist for anemia, also to a GI doctor. However, the patient stated that she just could not take the pain and came over to the emergency room. She had multiple investigations done which will be discussed below. CT of the chest is showing a large mass in the right lower lobe medially. There is concern for lung cancer. She has also lost significant weight. She denies any fever or chills. No sputum or hemoptysis. No increasing ankle edema. Currently, she is complaining of back pain. She is on room air. PAST MEDICAL HISTORY:  Significant for weight loss going on for the last several months and anemia. She has a history of hemorrhoidectomy many years ago. ALLERGIES:  NO KNOWN DRUG ALLERGIES. MEDICATIONS:  Currently, the patient is started on Flexeril 10 mg p.o. t.i.d., lidocaine cream topically b.i.d., multivitamins p.o. daily, nystatin oral suspension q.i.d., Zosyn 3.375 g IV q.8 hourly, and potassium supplement. She is ordered other p.r.n. medications. SOCIAL HISTORY:  She lives with her . She has children and grandchildren.   History of extensive tobacco abuse, starting at the age of 21 years, one pack a day and still continues to smoke. Denies drug or alcohol abuse. 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 FAMILY HISTORY:  She believes her father had lung cancer, but the details are not clear. One of the grandparents also had cancer. Otherwise, not contributory. REVIEW OF SYSTEMS:  Complete review of systems was undertaken. Pertinent findings as discussed above. All other systems were reviewed and are negative. PHYSICAL EXAMINATION: 
GENERAL:  The patient is an elderly, frail  female who does not appear to be in any distress. VITAL SIGNS:  Temperature is 98.7, pulse is 92 per minute, respiratory rate is 18 per minute, and blood pressure is 126/59. Saturation is 97% on room air. HEENT:  Showed pupils are equal and reactive to light. Pallor is noted. No icterus. Nasal passages are patent. Oropharynx is without obvious thrush. NECK:  Supple. Trachea is central.  No JVD or lymphadenopathy. The patient stated that she had a lump on the right side, which has grown away. She is not using any accessory muscles of respiration. However, she has had significant weight loss, with prominent supraclavicular fossa. CHEST:  Symmetrical with equal and fair air entry bilaterally. Breath sounds are diminished over the right lung base. An occasional crackle is noted. No rhonchi or wheezing. No obvious chest wall tenderness. HEART:  Rhythm is regular without any loud murmur or gallop. ABDOMEN:  Soft and benign. Bowel sounds are audible. No masses or organomegaly. EXTREMITIES:  Did not show any cyanosis or clubbing. No pitting edema. Pulses are palpable. NEUROLOGIC:  Grossly intact. SKIN:  Warm and dry. LABORATORY DATA:  CT of the chest done on 04/20/2021 was personally reviewed. She has a large right lower lobe medially located heterogeneous mass, which measures 8.1 cm in maximum dimension.   There is also T8 compression fracture. Increased right hilar lymphadenopathy is described. WBC count is 16.6 with neutrophils of 86%, hemoglobin is 8.7, platelet count of 327. Urinalysis showing nitrites and leukocyte esterase. INR is 1.1, BNP is 489, troponin is negative. Electrolytes are within normal limits. BUN is 15, creatinine is 0.43, blood glucose of 83. Influenza A and B is negative. COVID-19 rapid test is negative. ASSESSMENT AND PLAN: 
3  A 42-year-old  female with a history of extensive and ongoing tobacco use presenting with weight loss and back pain. She has a large mass in the right lower lobe located medially adjacent to the mediastinum and probably involving the mediastinum. It is measuring 8.1 cm in size. It is heterogeneous. There is right hilar lymphadenopathy. CT images were personally reviewed with the radiologist.  Most likely, it appears to be lung cancer. There also appears to be T8 compression fracture causing significant back pain. I discussed with the patient regarding bronchoscopy with biopsies, brushings, and washings. Risks, alternatives, benefits and complications were reviewed in detail. She understands and consents for the procedure. It has been scheduled for tomorrow at 12:00 noon with the help of Anesthesia. 2.  Chronic obstructive pulmonary disease. There are significant emphysematous changes on the CT scan. I will start her on nebulized bronchodilator treatments at this time. She is comfortable on room air. 3.  Back pain. This is due to compression fracture which may be pathological fractures. Advocate judicious pain control. I will also consult Hematology/Oncology. 4.  Anemia. 5.  For deep vein thrombosis prophylaxis, I will start her on sequential compression devices to the lower extremities. I will start her on chemical prophylaxis after the bronchoscopy tomorrow. Thank you for allowing me to participate in the care of this patient.   Agree with empiric antibiotics including Zosyn. I will follow the patient closely with you. Darline Franco MD 
 
 
[id number] 
D:  04/21/2021 13:13 
T:  04/21/2021 16:35 
JOB #:  [id number]

## 2021-04-21 NOTE — PROGRESS NOTES
Comprehensive Nutrition Assessment Type and Reason for Visit: Initial(low BMI, unintentional wt loss) Nutrition Recommendations/Plan:  
 
Liberalize to Regular diet after procedure Add Ensure Enlive TID (1050kcal, 60g pro) Add Magic cup BID (580kcal, 18g pro) *Pt able to meet 100%kcal/pro needs with supplementation alone Snacks daily in between meals Document %meal and supplement intakes, BMs in I/Os Obtain weekly measured wts Nutrition Assessment:  Admitted for SOB, generalized weakness, wt loss, difficulty swallowing. CT neck and chest finding large RLL mass with additional nodules, concerning for malignancy. Heme/onc consulted. Plans for Bronch tomorrow AM. Ordered Cardiac diet. Unable to assess intakes, pt fatigued, limited interview d/t this. Agreeable to Ensure Enlive TID and Magic cup, any flavor to try variety. Thrush impacting PO intakes at this time. Appetite is poor. Labs: H/H: 8.3/27.9, Cr 0.43, AST 6, ALT 11. Meds: Zosyn, dilaudid, MVI, morphine, flexeril. Malnutrition Assessment: 
Malnutrition Status:  Severe malnutrition Context:  Chronic illness Findings of the 6 clinical characteristics of malnutrition:  
Energy Intake:  7 - 75% or less est energy requirements for 1 month or longer Weight Loss:  7.00 - Greater than 10% over 6 months Body Fat Loss:  7 - Severe body fat loss, Orbital, Fat overlying ribs, Buccal region Muscle Mass Loss:  7 - Severe muscle mass loss(mild/moderate to temporalis), Clavicles (pectoralis &deltoids), Calf (gastrocnemius), Scapula (trapezius) Fluid Accumulation:  No significant fluid accumulation,   
 
 
Estimated Daily Nutrient Needs: 
Energy (kcal): 1500kcal (40kcal/kg); Weight Used for Energy Requirements: Current Protein (g): 75g (2g/kg); Weight Used for Protein Requirements: Current Fluid (ml/day): 1200mL (30mL/kg); Method Used for Fluid Requirements: ml/kg Nutrition Related Findings:  NFPE +severe muscle and fat losses. Pt appears very cachectic. +Chewing/Swallowing difficulty per pt. +Thrush. +Taste changes. Denied n/v. +Loose BMs. No edema. Wounds:   
None Current Nutrition Therapies: DIET NPO 
DIET CARDIAC Regular Anthropometric Measures: 
· Height:  5' 8\" (172.7 cm) · Current Body Wt:  37.5 kg (82 lb 9.6 oz)(RD obtained 4/21) · Admission Body Wt:  87 lb 8.4 oz · Usual Body Wt:  54.4 kg (120 lb)(12/2020) · Ideal Body Wt:  140 lbs:  59 % · BMI Category:  Underweight (BMI less than 18.5) Wt Readings from Last 5 Encounters:  
04/21/21 39.7 kg (87 lb 7.7 oz) 04/01/21 43.1 kg (95 lb)  
03/17/21 43.5 kg (96 lb) Per pt report, wt loss of 17kg (>30%BW) x 4 months - Severe. Per stated wts x1 month ago, pt with 4.5kg wt loss (10%BW) x 1 month Nutrition Diagnosis: · Severe malnutrition related to inadequate protein-energy intake as evidenced by BMI, weight loss greater than or equal to 10% in 6 months, severe muscle loss, severe loss of subcutaneous fat Nutrition Interventions:  
Food and/or Nutrient Delivery: Modify current diet, Start oral nutrition supplement(If consistent with GOC, pt may benefit from TF for nutrition) Nutrition Education and Counseling: No recommendations at this time Coordination of Nutrition Care: Continue to monitor while inpatient Goals: 
Meet >75% est needs via PO vs TF x 7 days, Wt gain 0.5kg/week, Lytes WNL Nutrition Monitoring and Evaluation:  
Behavioral-Environmental Outcomes: None identified Food/Nutrient Intake Outcomes: Food and nutrient intake, Supplement intake Physical Signs/Symptoms Outcomes: Chewing or swallowing, GI status, Weight, Skin, Nutrition focused physical findings Discharge Planning: Too soon to determine Electronically signed by Meeta Chinchilla on 4/21/2021 at 4:35 PM 
 
Contact: [phone] or via [employer]

## 2021-04-21 NOTE — PROGRESS NOTES
Reason for admission: Generalized weakness and unexplained weight loss RUR score: 8% Low 
 
DME: cane PCP: Dr. Danielle Muhammad DO; patient reported that she had an appointment with him 2021 and could not remember appointment date. Pharmacy: [facility]: None AMD: None Primary Decision Maker: (spouse) Aga Esquivel [phone] Utilization of home health: patient has never received home health or SNF. Patient lives in a 1.5 story home with her  Aga Esquivel [phone]); there are not stairs to access home in the front, patient reported the home has a deck in the back. Patient uses a cane, has no history of falling, and does not drive. Patient reported that she was independent and recently requires the assistance of her  and daughter with bathing. Patient has never received home health and SNF before. Patient has no POA and no AMD. 
 
DCP: home with home health

## 2021-04-21 NOTE — CONSULTS
Full consultation dictated. [phone]. We will proceed with bronchoscopy tomorrow morning. Consent obtained from the patient. Discussed with RN at the bedside. We will consult hematology/oncology. The patient has seen Dr. Berenice Umana as an outpatient. Thank you.

## 2021-04-21 NOTE — ROUTINE PROCESS
Bedside and Verbal shift change report given to Wanda Aldana (oncoming nurse) by Chanelle Cherry (offgoing nurse). Report included the following information SBAR and MAR.

## 2021-04-21 NOTE — PROGRESS NOTES
Advance Care Planning Healthcare Decision Maker:  
 
  Primary Decision Maker: Umair Comment - [facility] - [phone] Click here to complete [address] including selection of the Healthcare Decision Maker Relationship (ie \"Primary\")

## 2021-04-22 NOTE — PROGRESS NOTES
Physician Progress Note Sarah Lilly 
CSN #:                  [id number] :                       1945 ADMIT DATE:       2021 2:54 PM 
[address] [place] DATE: 
RESPONDING 
PROVIDER #:        Kolton Santos MD 
 
 
 
 
QUERY TEXT: 
 
Pt admitted with lung mass, pneumonia. Noted documentation of severe malnutrition on  by RD consultant. If possible, please document in progress notes and discharge summary: The medical record reflects the following: 
Risk Factors: possible malignancy, thrush, chewing/ swallowing difficulty Clinical Indicators: 
 
unintentional wt loss BMI 11.92% Malnutrition Assessment: 
Malnutrition Status:  Severe malnutrition Context:  Chronic illness Findings of the 6 clinical characteristics of malnutrition: 
Energy Intake:  7 - 75% or less est energy requirements for 1 month or longer Weight Loss:  7.00 - Greater than 10% over 6 months Body Fat Loss:  7 - Severe body fat loss, Orbital, Fat overlying ribs, Buccal region Muscle Mass Loss:  7 - Severe muscle mass loss(mild/moderate to temporalis), Clavicles (pectoralis &deltoids), Calf (gastrocnemius), Scapula (trapezius) Fluid Accumulation:  No significant fluid accumulation Treatment: RD consult; liberalize to reg diet, Ensure Enlive TID, Magic Cup BID Thank you, Rubia Montez RN, BSN, [place], [place] Clinical Documentation [phone] Options provided: 
-- Severe malnutrition confirmed present on admission 
-- Severe malnutrition confirmed not present on admission 
-- Severe malnutrition ruled out 
-- Other - I will add my own diagnosis -- Disagree - Not applicable / Not valid -- Disagree - Clinically unable to determine / Unknown 
-- Refer to Clinical Documentation Reviewer PROVIDER RESPONSE TEXT: 
 
The diagnosis of severe malnutrition was confirmed as present on admission.  
 
Query created by: Charmayne Singer on 2021 10:35 AM 
 
 
Electronically signed by:  Venkat Bullock Siria Ruby MD 4/22/2021 10:40 AM

## 2021-04-22 NOTE — PROGRESS NOTES
Pulmonary Progress Note NAME: Tadeo Armendariz :  1945 MRM:  [id number] Date/Time: 2021  5:38 PM 
 
 
  
Subjective:  
 
Patient evaluated in her room. She is back from bronchoscopy.  at the bedside. She is somewhat somnolent. However on room air. Has not coughed up any blood. Discussed with the  in detail. Also discussed with the RN. She is ordered a diet. Past Medical History reviewed and unchanged from Admission History and Physical 
 
  
Objective:  
 
Physical Exam  
 
Vitals:  
  
Last 24hrs VS reviewed since prior progress note. Most recent are: 
 
Visit Vitals BP (!) 136/59 (BP 1 Location: Left upper arm, BP Patient Position: At rest) Pulse 93 Temp 98.3 °F (36.8 °C) Resp 17 Ht 5' 8\" (1.727 m) Wt 35.6 kg (78 lb 6.3 oz) SpO2 95% Breastfeeding No  
BMI 11.92 kg/m² SpO2 Readings from Last 6 Encounters:  
21 95% 21 96% 21 97% Intake/Output Summary (Last 24 hours) at 2021 1738 Last data filed at 2021 1411 Gross per 24 hour Intake 250 ml Output 200 ml Net 50 ml GENERAL:  The patient is an elderly, frail  female who does not appear to be in any distress. Saturation is 97% on room air. HEENT:  Showed pupils are equal and reactive to light. Pallor is noted. No icterus. Nasal passages are patent. Oropharynx is without obvious thrush. NECK:  Supple. Trachea is central.  No JVD or lymphadenopathy. The patient stated that she had a lump on the right side, which has grown away. She is not using any accessory muscles of respiration. However, she has had significant weight loss, with prominent supraclavicular fossa. CHEST:  Symmetrical with equal and fair air entry bilaterally. Breath sounds are diminished in general.  She few rhonchi noted on the right side. No wheezing. No chest wall tenderness. HEART:  Rhythm is regular without any loud murmur or gallop.  
ABDOMEN:  Soft and benign. Bowel sounds are audible. No masses or organomegaly. EXTREMITIES:  Did not show any cyanosis or clubbing. No pitting edema. Pulses are palpable. NEUROLOGIC:  Grossly intact. SKIN:  Warm and dry. XR CHEST PORT Final Result CT NECK SOFT TISSUE W CONT Final Result 1.  2.3 x 1.7 x 3.6 cm enhancing mass in the posterior right parotid gland. Smaller soft tissue nodules are present in the inferior left parotid gland with  
an additional nodule suspected in the right parotid gland anterior to the  
dominant mass. Overall, in a patient with suspected lung malignancy findings are  
worrisome for metastatic intraparotid adenopathy. Correlation with PET/CT may be  
helpful as clinically appropriate. 2.  Chronic right maxillary sinusitis. CT CHEST W CONT Final Result 1. Large mass centered at the interface between the right lower lobe and the  
mediastinum, as detailed above, highly suspicious for neoplasm with leading  
differential consideration of lung cancer. 2. The mass abuts several mediastinal structures, including the esophagus and  
the heart with direct invasion difficult to exclude on this exam.  
3. T8 compression fracture of unknown chronicity. The mass also abuts the  
vertebral body making a pathologic fracture possible. 4. Erosion of the anterior right lateral aspect of the T9 vertebral body which  
is suspicious for direct invasion by the mass. 5. Enlarged right hilar lymph node, suspicious for disease involvement. 6. Indeterminate 4 mm right middle lobe solid nodule. XR CHEST PORT Final Result 1. Right hilar fullness and increased density with suspicion for potential right  
hilar mass. Recommend further evaluation with contrast enhanced chest CT. 2. See above for additional comments. The above was discussed with and acknowledged by Dr. Kathy Jones by telephone  
on  4/20/2021 4:09 PM. XR FLUOROSCOPY UNDER 60 MINUTES    (Results Pending) Lab Data Reviewed: (see below) Medications: 
Current Facility-Administered Medications Medication Dose Route Frequency  EPINEPHrine (ADRENALIN) 0.1 mg/mL syringe    PRN  
 lidocaine (XYLOCAINE) 10 mg/mL (1 %) injection    PRN  mineral oil (topical)    PRN  
 HYDROmorphone (DILAUDID) syringe 0.5 mg  0.5 mg IntraVENous Q4H PRN  
 albuterol-ipratropium (DUO-NEB) 2.5 MG-0.5 MG/3 ML  3 mL Nebulization Q4H PRN  
 melatonin tablet 6 mg  6 mg Oral QHS  nystatin (MYCOSTATIN) 100,000 unit/mL oral suspension 500,000 Units  500,000 Units Oral QID  multivitamin, tx-iron-ca-min (THERA-M w/ IRON) tablet 1 Tab  1 Tab Oral DAILY  lidocaine (XYLOCAINE) 4 % cream   Topical BID  cyclobenzaprine (FLEXERIL) tablet 10 mg  10 mg Oral TID  sodium chloride (NS) flush 5-40 mL  5-40 mL IntraVENous Q8H  
 sodium chloride (NS) flush 5-40 mL  5-40 mL IntraVENous PRN  
 acetaminophen (TYLENOL) tablet 650 mg  650 mg Oral Q6H PRN Or  
 acetaminophen (TYLENOL) suppository 650 mg  650 mg Rectal Q6H PRN  polyethylene glycol (MIRALAX) packet 17 g  17 g Oral DAILY PRN  promethazine (PHENERGAN) tablet 12.5 mg  12.5 mg Oral Q6H PRN Or  
 ondansetron (ZOFRAN) injection 4 mg  4 mg IntraVENous Q6H PRN  piperacillin-tazobactam (ZOSYN) 3.375 g in 0.9% sodium chloride (MBP/ADV) 100 mL MBP  3.375 g IntraVENous Q8H  
 
 
______________________________________________________________________ Lab Review:  
 
Recent Labs  
  04/22/21 
0657 04/21/21 
0308 04/20/21 
1550 WBC 17.0* 16.6* 16.7* HGB 9.5* 8.3* 8.9* HCT 31.8* 27.9* 28.9*  
* 541* 604* Recent Labs  
  04/22/21 
0657 04/21/21 
0308 04/20/21 
1550 * 140 139  
K 4.5 3.8 3.4*  
 107 104 CO2 23 29 29 GLU 82 83 93 BUN 12 15 14 CREA 0.48* 0.43* 0.54* CA 10.2* 10.2* 10.0 MG 2.2  --   --   
ALB  --  2.3* 2.6* ALT  --  11* 10* INR  --   --  1.1 No components found for: [place] No results for input(s): PH, PCO2, PO2, HCO3, FIO2 in the last 72 hours. Recent Labs  
  04/20/21 
1550 INR 1.1 Other pertinent lab:  
CT of the chest done on 04/20/2021 was personally reviewed. She has a large right lower lobe medially located heterogeneous mass, which measures 8.1 cm in maximum dimension. There is also T8 compression fracture. Increased right hilar lymphadenopathy is described. Influenza A and B is negative. COVID-19 rapid test is negative. Assessment & Plan: 1. A 63-year-old  female with a history of extensive and ongoing tobacco use presenting with weight loss and back pain. She has a large mass in the right lower lobe located medially adjacent to the mediastinum and probably involving the mediastinum. It is measuring 8.1 cm in size. It is heterogeneous. There is right hilar lymphadenopathy. CT images were personally reviewed with the radiologist.  Most likely, it appears to be lung cancer. There also appears to be T8 compression fracture causing significant back pain. The patient underwent bronchoscopy today under general anesthesia with LMA. There was extrinsic compression noted in the distal bronchus intermedius. However I was able to pass the bronchoscope in the right middle lobe and the right lower lobe segments. Biopsies were taken from this area. 2 transbronchial biopsies were also taken. Cytology brushings and washings were obtained. Await cytology and pathology. Patient did fairly well. Post procedure chest x-ray does not show any pneumothorax. 2.  Chronic obstructive pulmonary disease. There are significant emphysematous changes on the CT scan. Continue with nebulized bronchodilator treatments at this time. She is comfortable on room air. 3.  Back pain. This is due to compression fracture which may be pathological fractures. Advocate judicious pain control. Hematology/Oncology consulted. 4.  Anemia.  
5.  For deep vein thrombosis prophylaxis, I will start her on sequential compression devices to the lower extremities. I will start her on chemical prophylaxis. 
  
Thank you for allowing me to participate in the care of this patient. Agree with empiric antibiotics including Zosyn. I will follow the patient closely with you. Discussed with the RN. Also discussed detail with the daughter after the procedure. Discussed with the  at the bedside. More than 30 minutes spent with patient care. Willie Black MD

## 2021-04-22 NOTE — PROGRESS NOTES
Progress Note Patient: Philippe Cordero MRN: [id number]  SSN: [id number] YOB: 1945  Age: 76 y.o. Sex: female Admit Date: 4/20/2021 LOS: 2 days Subjective:  
 
Patient seen in room, s/p bronchoscopy today. She denies any pain. GI is following in consultation for anemia. Today's H&H, 9.5 and 31.8. 
 
-  Patient was admitted to the hospital with complaints of shortness of breath, generalized weakness, and lost of weight since December. Patient's symptoms started in December when she noticed that she is gradually loosing weight, worsening generalized weakness and shortness of breath. She denies any fever, chills, nausea or vomiting. She also reported of dark liquid stools. Denies taking any blood thinners. She states she was well before all these symptoms. She denies personal history of cancer. She appears cachexic. -CT chest shows a large mass in the right lower lobe. -CT neck shows mass in the right parotid gland. ENT seen patient, noted that right parotid mass is a concern for either metastatic or primary malignancy in parotid gland. Or a possible Warthin tumor (benign tumor among smokers). Patient has an extensive smoking history. Objective:  
 
Vitals:  
 04/22/21 1415 04/22/21 1420 04/22/21 1425 04/22/21 1430 BP: (!) 152/63 129/89 126/83 (!) 136/59 Pulse: 100 (!) 104 (!) 101 93 Resp: 22 19 23 17 Temp: 98.3 °F (36.8 °C) SpO2: 96% 95% 95% 95% Weight:      
Height:      
  
 
Intake and Output: 
Current Shift: 04/22 0701 - 04/22 1900 In: 250 [I.V.:250] Out: 200 [Urine:200] Last three shifts: No intake/output data recorded. Physical Exam:  
Skin:  Extremities and face reveal no rashes. No callaway erythema. HEENT: Sclerae anicteric. Extra-occular muscles are intact. No abnormal pigmentation of the lips. The neck is supple. Cardiovascular: Regular rate and rhythm. Respiratory:  Comfortable breathing with no accessory muscle use.   
GI: Abdomen nondistended, soft, and nontender. No enlargement of the liver or spleen. No masses palpable. Rectal:  Deferred Musculoskeletal: Extremities have good range of motion. Neurological:  Gross memory appears intact. Patient is alert and oriented. Psychiatric:  Mood appears appropriate with judgement intact. Lymphatic:  No visible adenopathy Lab/Data Review: 
Recent Results (from the past 24 hour(s)) METABOLIC PANEL, BASIC Collection Time: 04/22/21  6:57 AM  
Result Value Ref Range Sodium 135 (L) 136 - 145 mmol/L Potassium 4.5 3.5 - 5.1 mmol/L Chloride 106 97 - 108 mmol/L  
 CO2 23 21 - 32 mmol/L Anion gap 6 5 - 15 mmol/L Glucose 82 65 - 100 mg/dL BUN 12 6 - 20 mg/dL Creatinine 0.48 (L) 0.55 - 1.02 mg/dL BUN/Creatinine ratio 25 (H) 12 - 20 GFR est AA >60 >60 ml/min/1.73m2 GFR est non-AA >60 >60 ml/min/1.73m2 Calcium 10.2 (H) 8.5 - 10.1 mg/dL MAGNESIUM Collection Time: 04/22/21  6:57 AM  
Result Value Ref Range Magnesium 2.2 1.6 - 2.4 mg/dL CBC WITH AUTOMATED DIFF Collection Time: 04/22/21  6:57 AM  
Result Value Ref Range WBC 17.0 (H) 3.6 - 11.0 K/uL  
 RBC 3.80 3.80 - 5.20 M/uL HGB 9.5 (L) 11.5 - 16.0 g/dL HCT 31.8 (L) 35.0 - 47.0 % MCV 83.7 80.0 - 99.0 FL  
 MCH 25.0 (L) 26.0 - 34.0 PG  
 MCHC 29.9 (L) 30.0 - 36.5 g/dL  
 RDW 17.8 (H) 11.5 - 14.5 % PLATELET 015 (H) 595 - 400 K/uL MPV 8.7 (L) 8.9 - 12.9 FL  
 NEUTROPHILS 86 (H) 32 - 75 % LYMPHOCYTES 8 (L) 12 - 49 % MONOCYTES 6 5 - 13 % EOSINOPHILS 0 0 - 7 % BASOPHILS 0 0 - 1 % IMMATURE GRANULOCYTES 0 0.0 - 0.5 % ABS. NEUTROPHILS 14.7 (H) 1.8 - 8.0 K/UL  
 ABS. LYMPHOCYTES 1.3 0.8 - 3.5 K/UL  
 ABS. MONOCYTES 1.0 0.0 - 1.0 K/UL  
 ABS. EOSINOPHILS 0.1 0.0 - 0.4 K/UL  
 ABS. BASOPHILS 0.0 0.0 - 0.1 K/UL  
 ABS. IMM. GRANS. 0.1 (H) 0.00 - 0.04 K/UL  
 DF AUTOMATED    
  
 
 
 
 
CT NECK SOFT TISSUE W CONT Final Result 1.  2.3 x 1.7 x 3.6 cm enhancing mass in the posterior right parotid gland. Smaller soft tissue nodules are present in the inferior left parotid gland with  
an additional nodule suspected in the right parotid gland anterior to the  
dominant mass. Overall, in a patient with suspected lung malignancy findings are  
worrisome for metastatic intraparotid adenopathy. Correlation with PET/CT may be  
helpful as clinically appropriate. 2.  Chronic right maxillary sinusitis. CT CHEST W CONT Final Result 1. Large mass centered at the interface between the right lower lobe and the  
mediastinum, as detailed above, highly suspicious for neoplasm with leading  
differential consideration of lung cancer. 2. The mass abuts several mediastinal structures, including the esophagus and  
the heart with direct invasion difficult to exclude on this exam.  
3. T8 compression fracture of unknown chronicity. The mass also abuts the  
vertebral body making a pathologic fracture possible. 4. Erosion of the anterior right lateral aspect of the T9 vertebral body which  
is suspicious for direct invasion by the mass. 5. Enlarged right hilar lymph node, suspicious for disease involvement. 6. Indeterminate 4 mm right middle lobe solid nodule. XR CHEST PORT Final Result 1. Right hilar fullness and increased density with suspicion for potential right  
hilar mass. Recommend further evaluation with contrast enhanced chest CT. 2. See above for additional comments. The above was discussed with and acknowledged by Dr. Anjel Casper by telephone  
on  4/20/2021 4:09 PM. XR FLUOROSCOPY UNDER 60 MINUTES    (Results Pending) XR CHEST PORT    (Results Pending) Assessment:  
 
Active Problems: 
  Lung mass (4/20/2021) Pneumonia (4/20/2021) Plan: Anemia - hgb today is 9.5. Denies bleeding. Continue to monitor hemoglobin and transfuse as needed. Plan for upper EGD/EUS tomorrow. Please get consent. Keep NPO post midnight.   
 
Lung mass - Bronchoscopy with biopsy done today. Right Parotid mass - Plan for image guided FNA with IR per Dr. Marianela Shelton. 
 
Patient discussed with Dr Fallon Red and agrees to above impression and plan. Thank you for allowing me to participate in this patients care Signed By: Najma Carmichael NP April 22, 2021

## 2021-04-22 NOTE — ANESTHESIA POSTPROCEDURE EVALUATION
Procedure(s): BRONCHOSCOPY fluro. general 
 
Anesthesia Post Evaluation Multimodal analgesia: multimodal analgesia used between 6 hours prior to anesthesia start to PACU discharge Patient location during evaluation: PACU Patient participation: complete - patient participated Level of consciousness: awake Pain score: 0 Pain management: adequate Airway patency: patent Anesthetic complications: no 
Cardiovascular status: acceptable Respiratory status: acceptable Hydration status: acceptable Post anesthesia nausea and vomiting:  controlled Final Post Anesthesia Temperature Assessment:  Normothermia (36.0-37.5 degrees C) INITIAL Post-op Vital signs:  
Vitals Value Taken Time /76 04/22/21 1401 Temp Pulse 97 04/22/21 1401 Resp 24 04/22/21 1401 SpO2 99 % 04/22/21 1401

## 2021-04-22 NOTE — CONSULTS
Otolaryngology-HNS Consult Subjective:  
 Cher Lynn  
76 y.o.  
1945 Chief complaint/reason for consult -right parotid mass History of present illness 43-year-old female admitted for weight loss chest pain. Extensive smoking history. Also having back pain. She having fatigue. She is admitted a CT scan of the chest shows a large mass in the right lower lobe. She also has compression fracture in the back. Neck scan was done which incidentally noted a mass in the right parotid gland. She has no symptoms in the right neck. Review of Systems Review of Systems Constitutional: Positive for malaise/fatigue and weight loss. Negative for chills and fever. HENT: Positive for sore throat. Negative for ear pain, hearing loss, nosebleeds and tinnitus. Eyes: Negative for blurred vision and double vision. Respiratory: Positive for cough and shortness of breath. Negative for sputum production. Cardiovascular: Negative for chest pain and palpitations. Gastrointestinal: Negative for heartburn, nausea and vomiting. Musculoskeletal: Positive for back pain and joint pain. Skin: Negative. Neurological: Positive for weakness. Negative for dizziness, speech change and headaches. Endo/Heme/Allergies: Negative for environmental allergies. Does not bruise/bleed easily. Psychiatric/Behavioral: Negative for memory loss. The patient does not have insomnia. Past Medical History:  
Diagnosis Date  Anemia  Thrombocytosis ([address] Past Surgical History:  
Procedure Laterality Date  HX GI    
 hemrrhoid Family History Problem Relation Age of Onset  Cancer Mother   
     colon  Alzheimer Mother  Cancer Father   
     lung Social History Tobacco Use  Smoking status: Current Every Day Smoker Packs/day: 1.00 Types: Cigarettes  Smokeless tobacco: Never Used Substance Use Topics  Alcohol use: Not Currently Prior to Admission medications Medication Sig Start Date End Date Taking? Authorizing Provider  
iron aspgly,[id number]-Ca-suc (FERREX 150 FORTE PLUS) 845-91-88-4 mg-mg-mcg-mg cap cap Take 1 Cap by mouth two (2) times a day. Yes Provider, Historical  
lidocaine HCL (XYLOCAINE) 3 % topical cream Apply  to affected area two (2) times a day. 3/17/21  Yes Norberto Hutson DO  
tiZANidine (ZANAFLEX) 2 mg tablet One orally TID for back and shoulder pain 3/17/21  Yes Norberto Hutson DO No Known Allergies Objective:  
 
Visit Vitals BP (!) 99/51 (BP 1 Location: Left upper arm) Pulse 84 Temp 98.2 °F (36.8 °C) Resp 18 Ht 5' 8\" (1.727 m) Wt 78 lb 6.3 oz (35.6 kg) SpO2 96% Breastfeeding No  
BMI 11.92 kg/m² Physical Exam 
Vitals signs reviewed. Constitutional:   
   General: She is awake. She is not in acute distress. Appearance: She is cachectic. HENT:  
   Head: Normocephalic and atraumatic. Jaw: There is normal jaw occlusion. No trismus, tenderness or malocclusion. Salivary Glands: Right salivary gland is diffusely enlarged. Right salivary gland is not tender. Left salivary gland is not diffusely enlarged or tender. Comments: There is a subtle fullness to the tail of the parotid gland right retromandibular Right Ear: Hearing, tympanic membrane, ear canal and external ear normal.  
   Left Ear: Hearing, tympanic membrane, ear canal and external ear normal.  
   Ears:  
   Panchal exam findings: does not lateralize. Right Rinne: AC > BC. Left Rinne: AC > BC. Nose: No nasal deformity, septal deviation or mucosal edema. Right Nostril: No epistaxis. Left Nostril: No epistaxis. Right Turbinates: Not enlarged, swollen or pale. Left Turbinates: Not enlarged, swollen or pale. Right Sinus: No maxillary sinus tenderness or frontal sinus tenderness. Left Sinus: No maxillary sinus tenderness or frontal sinus tenderness. Mouth/Throat: Lips: Pink. No lesions. Mouth: Mucous membranes are dry. No oral lesions. Dentition: Normal dentition. No dental caries. Tongue: No lesions. Palate: No mass and lesions. Pharynx: Oropharynx is clear. Uvula midline. No oropharyngeal exudate or posterior oropharyngeal erythema. Tonsils: No tonsillar exudate. 0 on the right. 0 on the left. Comments: Erythematous tongue Eyes:  
   General: Vision grossly intact. Extraocular Movements: Extraocular movements intact. Right eye: No nystagmus. Left eye: No nystagmus. Conjunctiva/sclera: Conjunctivae normal.  
   Pupils: Pupils are equal, round, and reactive to light. Neck: Musculoskeletal: No edema or erythema. Thyroid: No thyroid mass, thyromegaly or thyroid tenderness. Trachea: Trachea and phonation normal. No tracheal tenderness or tracheal deviation. Comments: Thin neck no other adenopathy felt Cardiovascular:  
   Rate and Rhythm: Normal rate and regular rhythm. Pulmonary:  
   Effort: Pulmonary effort is normal. No respiratory distress. Breath sounds: No stridor. Musculoskeletal: Normal range of motion. General: No swelling or tenderness. Lymphadenopathy:  
   Cervical: No cervical adenopathy. Skin: 
   General: Skin is warm and dry. Findings: No lesion or rash. Neurological:  
   General: No focal deficit present. Mental Status: She is alert and oriented to person, place, and time. Mental status is at baseline. Cranial Nerves: Cranial nerves are intact. Coordination: Romberg sign negative. Gait: Gait is intact. Psychiatric:     
   Mood and Affect: Mood normal.     
   Behavior: Behavior normal. Behavior is cooperative. Recent Results (from the past 24 hour(s)) METABOLIC PANEL, BASIC Collection Time: 04/22/21  6:57 AM  
Result Value Ref Range Sodium 135 (L) 136 - 145 mmol/L Potassium 4.5 3.5 - 5.1 mmol/L  Chloride 106 97 - 108 mmol/L  
 CO2 23 21 - 32 mmol/L Anion gap 6 5 - 15 mmol/L Glucose 82 65 - 100 mg/dL BUN 12 6 - 20 mg/dL Creatinine 0.48 (L) 0.55 - 1.02 mg/dL BUN/Creatinine ratio 25 (H) 12 - 20 GFR est AA >60 >60 ml/min/1.73m2 GFR est non-AA >60 >60 ml/min/1.73m2 Calcium 10.2 (H) 8.5 - 10.1 mg/dL MAGNESIUM Collection Time: 04/22/21  6:57 AM  
Result Value Ref Range Magnesium 2.2 1.6 - 2.4 mg/dL CBC WITH AUTOMATED DIFF Collection Time: 04/22/21  6:57 AM  
Result Value Ref Range WBC 17.0 (H) 3.6 - 11.0 K/uL  
 RBC 3.80 3.80 - 5.20 M/uL HGB 9.5 (L) 11.5 - 16.0 g/dL HCT 31.8 (L) 35.0 - 47.0 % MCV 83.7 80.0 - 99.0 FL  
 MCH 25.0 (L) 26.0 - 34.0 PG  
 MCHC 29.9 (L) 30.0 - 36.5 g/dL  
 RDW 17.8 (H) 11.5 - 14.5 % PLATELET 138 (H) 294 - 400 K/uL MPV 8.7 (L) 8.9 - 12.9 FL  
 NEUTROPHILS 86 (H) 32 - 75 % LYMPHOCYTES 8 (L) 12 - 49 % MONOCYTES 6 5 - 13 % EOSINOPHILS 0 0 - 7 % BASOPHILS 0 0 - 1 % IMMATURE GRANULOCYTES 0 0.0 - 0.5 % ABS. NEUTROPHILS 14.7 (H) 1.8 - 8.0 K/UL  
 ABS. LYMPHOCYTES 1.3 0.8 - 3.5 K/UL  
 ABS. MONOCYTES 1.0 0.0 - 1.0 K/UL  
 ABS. EOSINOPHILS 0.1 0.0 - 0.4 K/UL  
 ABS. BASOPHILS 0.0 0.0 - 0.1 K/UL  
 ABS. IMM. GRANS. 0.1 (H) 0.00 - 0.04 K/UL  
 DF AUTOMATED I have personally reviewed all pertinent notes, labs, results, and imaging studies for this patient. Assessment/Plan:  
 
Right parotid mass -I reviewed the CT scan images personally. There is an enhancing mass here. Certainly in light of her CT chest findings there is concern for either metastatic or primary malignancy in the parotid gland. Another possibility is a Warthin tumor which is a benign tumor common among smokers. She would benefit from a image guided FNA with interventional radiology for the right parotid mass. I explained this to her and she does agree to move forward with this. Thank you for this consult. Manuel Paz MD, FACS [facility] [address] Mikel Chou [facility]  [place]  [phone]

## 2021-04-22 NOTE — PROGRESS NOTES
Hospitalist Progress Note NAME: Marky Leigh :  1945 MRN:  [id number] Subjective: Chief Complaint / Reason for Physician Visit Patient seen and evaluated at bedside, no acute events overnight, patient currently has no new complaints. Discussed with RN events overnight. Review of Systems: 
Symptom Y/N Comments  Symptom Y/N Comments Fever/Chills N   Chest Pain N Poor Appetite Y   Edema N   
Cough N   Abdominal Pain N Sputum N   Joint Pain N   
SOB/YOUNG N   Pruritis/Rash Y Nausea/vomit N   Tolerating PT/OT NA Diarrhea N   Tolerating Diet Y Constipation N   Other Could NOT obtain due to:   
Patient denies any fevers nausea vomiting lightheadedness dizziness chest pain palpitations headache focal weakness loss of sensation auditory or visual symptoms abdominal stool or urinary complaints or any other associated symptoms. Objective: VITALS:  
Last 24hrs VS reviewed since prior progress note. Most recent are: 
Patient Vitals for the past 24 hrs: 
 Temp Pulse Resp BP SpO2  
21 0744 98.2 °F (36.8 °C) 84 18 (!) 99/51 96 % 21 1946 100.1 °F (37.8 °C) 80 16 (!) 113/55 96 % 21 1627 98.9 °F (37.2 °C) 81 18 (!) 110/56 97 % Intake/Output Summary (Last 24 hours) at 2021 1143 Last data filed at 2021 0710 Gross per 24 hour Intake  Output 200 ml Net -200 ml PHYSICAL EXAM: 
General: Patient appears cachectic however comfortable not in any acute distress EENT:  EOMI. Anicteric sclerae. MMM Resp:  Decreased air entry bilaterally in bilateral lower lung zones CV:  Regular  rhythm, s1 + s2, no murmurs rubs or gallops  No edema GI:  Soft, Non distended, Non tender. +Bowel sounds Neurologic:  Alert and oriented X 3, normal speech, Psych:   Good insight. Not anxious nor agitated Skin:  No rashes. No jaundice Procedures: see electronic medical records for all procedures/Xrays and details which were not copied into this note but were reviewed prior to creation of Plan. LABS: 
I reviewed today's most current labs and imaging studies. Pertinent labs include: 
Recent Labs  
  04/21/21 
0308 04/20/21 
1550 WBC 16.6* 16.7* HGB 8.3* 8.9* HCT 27.9* 28.9*  
* 604* Recent Labs  
  04/21/21 
0308 04/20/21 
1550  139  
K 3.8 3.4*  
 104 CO2 29 29 GLU 83 93 BUN 15 14 CREA 0.43* 0.54* CA 10.2* 10.0 ALB 2.3* 2.6* TBILI 0.3 0.4 ALT 11* 10* INR  --  1.1 Signed: Johanna Morales MD 
 
CT neck soft tissue:IMPRESSION 
1.  2.3 x 1.7 x 3.6 cm enhancing mass in the posterior right parotid gland. Smaller soft tissue nodules are present in the inferior left parotid gland with 
an additional nodule suspected in the right parotid gland anterior to the 
dominant mass. Overall, in a patient with suspected lung malignancy findings are 
worrisome for metastatic intraparotid adenopathy. Correlation with PET/CT may be 
helpful as clinically appropriate. 2.  Chronic right maxillary sinusitis CT chest with contrast:IMPRESSION 1. Large mass centered at the interface between the right lower lobe and the 
mediastinum, as detailed above, highly suspicious for neoplasm with leading 
differential consideration of lung cancer. 2. The mass abuts several mediastinal structures, including the esophagus and 
the heart with direct invasion difficult to exclude on this exam. 
3. T8 compression fracture of unknown chronicity. The mass also abuts the 
vertebral body making a pathologic fracture possible. 4. Erosion of the anterior right lateral aspect of the T9 vertebral body which 
is suspicious for direct invasion by the mass. 5. Enlarged right hilar lymph node, suspicious for disease involvement. 6. Indeterminate 4 mm right middle lobe solid nodule. Reviewed most current lab test results and cultures  YES Reviewed most current radiology test results   YES Review and summation of old records today    NO Reviewed patient's current orders and MAR    YES 
PMH/SH reviewed - no change compared to H&P Assessment/plan: 
 
Lung mass/pneumoniapatient presents with above-mentioned symptomatology was found to have a significant lung mass between the right lower lobe as well as mediastinotomy concerning for malignancy given unexpected weight loss as well as possible con commitment infection with evidence of leukocytosis Follow up blood cultures Follow up sputum cultures N.p.o. for bronchoscopy/biopsy Zosyn for antibiotic coverage Pulmonology consult appreciated, Oncology consult appreciated 
  
Anemiapatient presents with above-mentioned symptomatology and was being worked up for anemia as an outpatient with concerns for possible GI bleeding, patient was scheduled to have an outpatient endoscopy Maintain active type and screen Continue to trend hemoglobin and hematocrit Hematology consult appreciated Obtain gastroenterology consult 
  
Hypokalemiaresolved 
  
Oral thrushof note patient has evidence of oral thrush upon physical examination 
continue nystatin 4 times daily Continue to monitor Urinary Tract infection - UA consistent with UTI, pt does not meet SIRS/sepsis criteria at this time Follow up blood cultures Follow up urine cultures Continue Zosyn for abx coverage Continue to monitor 
  
ProphylaxisSCDs, currently holding off on Lovenox given concern for possible GI bleed FENcardiac diet, n.p.o., replete potassium and magnesium Full code, Disposition - pending clinical improvement, Pulmonology/Oncology/Gastroenterology evaluation/clearance________________________________________________________________________ Care Plan discussed with: 
  Comments Patient X Family  X   
RN X   
Care Manager x Consultant  x X Multidiciplinary team rounds were held today with , nursing, pharmacist and clinical coordinator.   Patient's plan of care was discussed; medications were reviewed and discharge planning was addressed. ________________________________________________________________________ Total NON critical care TIME:  35  Minutes Comments >50% of visit spent in counseling and coordination of care X   
________________________________________________________________________ Gladis Herrera MD

## 2021-04-22 NOTE — PROGRESS NOTES
In with patient doctor at bedside. Questions answered. Repositioned to comfort. Pain medications administered. Frequency of medication educated. Will continue to monitor.

## 2021-04-22 NOTE — ROUTINE PROCESS
Pt back to room 422 from PACU report given to [facility]

## 2021-04-22 NOTE — ANESTHESIA PREPROCEDURE EVALUATION
Relevant Problems No relevant active problems Anesthetic History No history of anesthetic complications Review of Systems / Medical History Patient summary reviewed, nursing notes reviewed and pertinent labs reviewed Pulmonary Within defined limits Neuro/Psych Within defined limits Cardiovascular Within defined limits GI/Hepatic/Renal 
Within defined limits Endo/Other Anemia Other Findings Comments: Lung cancer Past Medical History:  
Diagnosis Date  Anemia  Thrombocytosis ([address] Past Surgical History:  
Procedure Laterality Date  HX GI    
 hemrrhoid Current Outpatient Medications Medication Instructions  iron aspgly,[id number]-suc (FERREX 150 FORTE PLUS) [id number] mg-mg-mcg-mg cap cap 1 Cap, Oral, 2 TIMES DAILY  lidocaine HCL (XYLOCAINE) 3 % topical cream Topical, 2 TIMES DAILY  tiZANidine (ZANAFLEX) 2 mg tablet One orally TID for back and shoulder pain Current Facility-Administered Medications Medication Dose Route Frequency  HYDROmorphone (DILAUDID) syringe 0.5 mg  0.5 mg IntraVENous Q4H PRN  
 albuterol-ipratropium (DUO-NEB) 2.5 MG-0.5 MG/3 ML  3 mL Nebulization Q4H PRN  
 melatonin tablet 6 mg  6 mg Oral QHS  nystatin (MYCOSTATIN) 100,000 unit/mL oral suspension 500,000 Units  500,000 Units Oral QID  multivitamin, tx-iron-ca-min (THERA-M w/ IRON) tablet 1 Tab  1 Tab Oral DAILY  lidocaine (XYLOCAINE) 4 % cream   Topical BID  cyclobenzaprine (FLEXERIL) tablet 10 mg  10 mg Oral TID  sodium chloride (NS) flush 5-40 mL  5-40 mL IntraVENous Q8H  
 sodium chloride (NS) flush 5-40 mL  5-40 mL IntraVENous PRN  
 acetaminophen (TYLENOL) tablet 650 mg  650 mg Oral Q6H PRN Or  
 acetaminophen (TYLENOL) suppository 650 mg  650 mg Rectal Q6H PRN  polyethylene glycol (MIRALAX) packet 17 g  17 g Oral DAILY PRN  promethazine (PHENERGAN) tablet 12.5 mg  12.5 mg Oral Q6H PRN Or  
 ondansetron (ZOFRAN) injection 4 mg  4 mg IntraVENous Q6H PRN  piperacillin-tazobactam (ZOSYN) 3.375 g in 0.9% sodium chloride (MBP/ADV) 100 mL MBP  3.375 g IntraVENous Q8H Patient Vitals for the past 24 hrs: 
 Temp Pulse Resp BP SpO2  
04/22/21 0744 36.8 °C (98.2 °F) 84 18 (!) 99/51 96 % 04/21/21 1946 37.8 °C (100.1 °F) 80 16 (!) 113/55 96 % 04/21/21 1627 37.2 °C (98.9 °F) 81 18 (!) 110/56 97 % Lab Results Component Value Date/Time WBC 17.0 (H) 04/22/2021 06:57 AM  
 HGB 9.5 (L) 04/22/2021 06:57 AM  
 HCT 31.8 (L) 04/22/2021 06:57 AM  
 PLATELET 507 (H) 08/27/6907 06:57 AM  
 MCV 83.7 04/22/2021 06:57 AM  
 
Lab Results Component Value Date/Time Sodium 135 (L) 04/22/2021 06:57 AM  
 Potassium 4.5 04/22/2021 06:57 AM  
 Chloride 106 04/22/2021 06:57 AM  
 CO2 23 04/22/2021 06:57 AM  
 Anion gap 6 04/22/2021 06:57 AM  
 Glucose 82 04/22/2021 06:57 AM  
 BUN 12 04/22/2021 06:57 AM  
 Creatinine 0.48 (L) 04/22/2021 06:57 AM  
 BUN/Creatinine ratio 25 (H) 04/22/2021 06:57 AM  
 GFR est AA >60 04/22/2021 06:57 AM  
 GFR est non-AA >60 04/22/2021 06:57 AM  
 Calcium 10.2 (H) 04/22/2021 06:57 AM  
 
No results found for: APTT, PTP, INR, INREXT Lab Results Component Value Date/Time Glucose 82 04/22/2021 06:57 AM  
 
Physical Exam 
 
Airway Mallampati: I 
TM Distance: 4 - 6 cm Neck ROM: normal range of motion Mouth opening: Normal 
 
 Cardiovascular Rhythm: regular Rate: normal 
 
 
 
 Dental 
No notable dental hx Pulmonary Breath sounds clear to auscultation Abdominal 
GI exam deferred Other Findings Anesthetic Plan ASA: 3 Anesthesia type: general 
 
 
 
 
Induction: Intravenous Anesthetic plan and risks discussed with: Patient and Family

## 2021-04-23 NOTE — PROGRESS NOTES
Dr. Maria Kraft notified regarding pt not having endoscopy done due to no anestesiologist after 3 pm and pt not tolerating IV K+;  New orders received;  Dr. Momo Rogers does not want pt discharged until he speaks with Dr. Maria Kraft;   Oncoming nurse Gala Moraes notified of this;

## 2021-04-23 NOTE — PROGRESS NOTES
Hematology and Oncology Progress Note Patient: Uli Arreola MRN: [id number]  SSN: [id number] YOB: 1945  Age: 76 y.o. Sex: female Admit Date: 4/20/2021 LOS: 2 days Chief Complaint: Patient has dyspnea Subjective:  
 
Patient is accompanied by her  and daughter at bedside. Patient had bronchoscopy today. Patient's appetite is decreased. She also has a back pain. She has dyspnea. Objective:  
 
Vitals:  
 04/22/21 1415 04/22/21 1420 04/22/21 1425 04/22/21 1430 BP: (!) 152/63 129/89 126/83 (!) 136/59 Pulse: 100 (!) 104 (!) 101 93 Resp: 22 19 23 17 Temp: 98.3 °F (36.8 °C) SpO2: 96% 95% 95% 95% Weight:      
Height:      
  
Physical Exam: 
Constitutional: Elderly white female looks chronically ill. Not in any acute distress or pain. Eyes: Sclerae anicteric. Conjunctivae shows pallor. ENMT: Has mask on. Patient has bitemporal wasting. She also has slight puffiness around her eyes. Neck: No adenopathy, JVD or thyromegaly. Respiratory: Lungs are clear bilaterally. Cardiovascular: Normal sinus rhythm. Abdomen: Scaphoid abdomen. Soft, nontender, no hepatosplenomegaly. No guarding or rigidity. Bowel sounds present. Extremities: She does have bilateral lower extremity swelling. Skin: No petechiae; no skin rash. Neurologic: Alert/oriented x 3. Lab/Data Review: 
 
Recent Results (from the past 24 hour(s)) METABOLIC PANEL, BASIC Collection Time: 04/22/21  6:57 AM  
Result Value Ref Range Sodium 135 (L) 136 - 145 mmol/L Potassium 4.5 3.5 - 5.1 mmol/L Chloride 106 97 - 108 mmol/L  
 CO2 23 21 - 32 mmol/L Anion gap 6 5 - 15 mmol/L Glucose 82 65 - 100 mg/dL BUN 12 6 - 20 mg/dL Creatinine 0.48 (L) 0.55 - 1.02 mg/dL BUN/Creatinine ratio 25 (H) 12 - 20 GFR est AA >60 >60 ml/min/1.73m2 GFR est non-AA >60 >60 ml/min/1.73m2 Calcium 10.2 (H) 8.5 - 10.1 mg/dL MAGNESIUM  Collection Time: 04/22/21  6:57 AM Result Value Ref Range Magnesium 2.2 1.6 - 2.4 mg/dL CBC WITH AUTOMATED DIFF Collection Time: 04/22/21  6:57 AM  
Result Value Ref Range WBC 17.0 (H) 3.6 - 11.0 K/uL  
 RBC 3.80 3.80 - 5.20 M/uL HGB 9.5 (L) 11.5 - 16.0 g/dL HCT 31.8 (L) 35.0 - 47.0 % MCV 83.7 80.0 - 99.0 FL  
 MCH 25.0 (L) 26.0 - 34.0 PG  
 MCHC 29.9 (L) 30.0 - 36.5 g/dL  
 RDW 17.8 (H) 11.5 - 14.5 % PLATELET 799 (H) 967 - 400 K/uL MPV 8.7 (L) 8.9 - 12.9 FL  
 NEUTROPHILS 86 (H) 32 - 75 % LYMPHOCYTES 8 (L) 12 - 49 % MONOCYTES 6 5 - 13 % EOSINOPHILS 0 0 - 7 % BASOPHILS 0 0 - 1 % IMMATURE GRANULOCYTES 0 0.0 - 0.5 % ABS. NEUTROPHILS 14.7 (H) 1.8 - 8.0 K/UL  
 ABS. LYMPHOCYTES 1.3 0.8 - 3.5 K/UL  
 ABS. MONOCYTES 1.0 0.0 - 1.0 K/UL  
 ABS. EOSINOPHILS 0.1 0.0 - 0.4 K/UL  
 ABS. BASOPHILS 0.0 0.0 - 0.1 K/UL  
 ABS. IMM. GRANS. 0.1 (H) 0.00 - 0.04 K/UL  
 DF AUTOMATED Assessment and plan:  
 
66-year-old white female who has shortness of breath dysphagia and weight loss. Patient had CT of chest which shows right hilar mass with hilar adenopathy and possible T8 vertebral erosion. Obviously this is malignancy unless proven otherwise.  
-Patient had bronchoscopy today and also reviewed bronchoscopy findings. Case was also discussed with Dr. April Parrish from pulmonary. I have also discussed my impression and plan with patient's daughter and patient. 
-Patient has lost lots of weight lately which is a bad prognostic sign for her possible lung cancer. 
-Based on surgical pathology will make further recommendations. 
-Patient has leukocytosis and thrombocytosis both are reactive possibly from malignancy. She had hematologic work-up and it was negative for any myeloproliferative disorders. This dictation was done by dragon, computer voice recognition software. Often unanticipated grammatical, syntax, phones and other interpretive errors are inadvertently transcribed.   Please excuse errors that have escaped final proofreading. Signed By: Renae Wynn MD   
 April 22, 2021

## 2021-04-23 NOTE — PROGRESS NOTES
Progress Note Patient: Kathy Hernandez MRN: [id number]  SSN: [id number] YOB: 1945  Age: 76 y.o. Sex: female Admit Date: 4/20/2021 LOS: 3 days Subjective:  
Patient seen in room, family at bedside. She is complaining of back pain. She had a bronchoscopy with biopsy procedure done yesterday with inconclusive report. She is scheduled for EGD/EUS today. GI is following for anemia consult. H&H today Is 8.5 and 28. 1. No report of overt bleeding. 
 
-Patient was admitted to the hospital with complaints of shortness of breath, generalized weakness, and lost of weight since December. Patient's symptoms started in December when she noticed that she is gradually loosing weight, worsening generalized weakness and shortness of breath. She denies any fever, chills, nausea or vomiting. She also reported of dark liquid stools. Denies taking any blood thinners. She states she was well before all these symptoms. She denies personal history of cancer. She appears cachexic.  
  
-CT chest shows a large mass in the right lower lobe. -CT neck shows mass in the right parotid gland.  
  
ENT seen patient, noted that right parotid mass is a concern for either metastatic or primary malignancy in parotid gland. Or a possible Warthin tumor (benign tumor among smokers). Patient has an extensive smoking history. Objective:  
 
Vitals:  
 04/22/21 1430 04/22/21 2054 04/23/21 7265 04/23/21 7740 BP: (!) 136/59 (!) 103/57 110/60 130/67 Pulse: 93 83 84 85 Resp: 17 16 18 18 Temp:  97.7 °F (36.5 °C) 98.7 °F (37.1 °C) 97.5 °F (36.4 °C) SpO2: 95% 97% 98% 98% Weight:    37.2 kg (81 lb 15.8 oz) Height:      
  
 
Intake and Output: 
Current Shift: No intake/output data recorded. Last three shifts: 04/21 1901 - 04/23 0700 In: 250 [I.V.:250] Out: 200 [Urine:200] Physical Exam:  
Skin:  Extremities and face reveal no rashes. No callaway erythema. HEENT: Sclerae anicteric.  Extra-occular muscles are intact. No abnormal pigmentation of the lips. The neck is supple. Cardiovascular: Regular rate and rhythm. Respiratory:  Comfortable breathing with no accessory muscle use. GI:  Abdomen nondistended, soft, and nontender. No enlargement of the liver or spleen. No masses palpable. Rectal:  Deferred Musculoskeletal: Extremities have good range of motion. Neurological:  Gross memory appears intact. Patient is alert and oriented. Psychiatric:  Mood appears appropriate with judgement intact. Lymphatic:  No visible adenopathy Lab/Data Review: 
Recent Results (from the past 24 hour(s)) METABOLIC PANEL, BASIC Collection Time: 04/23/21  5:43 AM  
Result Value Ref Range Sodium 140 136 - 145 mmol/L Potassium 3.4 (L) 3.5 - 5.1 mmol/L Chloride 104 97 - 108 mmol/L  
 CO2 32 21 - 32 mmol/L Anion gap 4 (L) 5 - 15 mmol/L Glucose 98 65 - 100 mg/dL BUN 13 6 - 20 mg/dL Creatinine 0.47 (L) 0.55 - 1.02 mg/dL BUN/Creatinine ratio 28 (H) 12 - 20 GFR est AA >60 >60 ml/min/1.73m2 GFR est non-AA >60 >60 ml/min/1.73m2 Calcium 10.6 (H) 8.5 - 10.1 mg/dL MAGNESIUM Collection Time: 04/23/21  5:43 AM  
Result Value Ref Range Magnesium 2.3 1.6 - 2.4 mg/dL CBC WITH AUTOMATED DIFF Collection Time: 04/23/21  5:43 AM  
Result Value Ref Range WBC 15.0 (H) 3.6 - 11.0 K/uL  
 RBC 3.39 (L) 3.80 - 5.20 M/uL HGB 8.5 (L) 11.5 - 16.0 g/dL HCT 28.1 (L) 35.0 - 47.0 % MCV 82.9 80.0 - 99.0 FL  
 MCH 25.1 (L) 26.0 - 34.0 PG  
 MCHC 30.2 30.0 - 36.5 g/dL  
 RDW 17.6 (H) 11.5 - 14.5 % PLATELET 247 (H) 109 - 400 K/uL MPV 8.7 (L) 8.9 - 12.9 FL  
 NEUTROPHILS 92 (H) 32 - 75 % LYMPHOCYTES 5 (L) 12 - 49 % MONOCYTES 3 (L) 5 - 13 % EOSINOPHILS 0 0 - 7 % BASOPHILS 0 0 - 1 % IMMATURE GRANULOCYTES 0 0.0 - 0.5 % ABS. NEUTROPHILS 13.8 (H) 1.8 - 8.0 K/UL  
 ABS. LYMPHOCYTES 0.8 0.8 - 3.5 K/UL  
 ABS. MONOCYTES 0.4 0.0 - 1.0 K/UL  
 ABS.  EOSINOPHILS 0.0 0.0 - 0.4 K/UL ABS. BASOPHILS 0.0 0.0 - 0.1 K/UL  
 ABS. IMM. GRANS. 0.0 0.00 - 0.04 K/UL  
 DF AUTOMATED    
  
 
 
 
 
XR CHEST PORT Final Result XR FLUOROSCOPY UNDER 60 MINUTES Final Result CT NECK SOFT TISSUE W CONT Final Result 1.  2.3 x 1.7 x 3.6 cm enhancing mass in the posterior right parotid gland. Smaller soft tissue nodules are present in the inferior left parotid gland with  
an additional nodule suspected in the right parotid gland anterior to the  
dominant mass. Overall, in a patient with suspected lung malignancy findings are  
worrisome for metastatic intraparotid adenopathy. Correlation with PET/CT may be  
helpful as clinically appropriate. 2.  Chronic right maxillary sinusitis. CT CHEST W CONT Final Result 1. Large mass centered at the interface between the right lower lobe and the  
mediastinum, as detailed above, highly suspicious for neoplasm with leading  
differential consideration of lung cancer. 2. The mass abuts several mediastinal structures, including the esophagus and  
the heart with direct invasion difficult to exclude on this exam.  
3. T8 compression fracture of unknown chronicity. The mass also abuts the  
vertebral body making a pathologic fracture possible. 4. Erosion of the anterior right lateral aspect of the T9 vertebral body which  
is suspicious for direct invasion by the mass. 5. Enlarged right hilar lymph node, suspicious for disease involvement. 6. Indeterminate 4 mm right middle lobe solid nodule. XR CHEST PORT Final Result 1. Right hilar fullness and increased density with suspicion for potential right  
hilar mass. Recommend further evaluation with contrast enhanced chest CT. 2. See above for additional comments. The above was discussed with and acknowledged by Dr. Sadia Swanson by telephone  
on  4/20/2021 4:09 PM. Assessment:  
 
Active Problems: 
  Lung mass (4/20/2021) Pneumonia (4/20/2021) Plan: Anemia - Hgb 8.5 today, no report of overt bleeding. EGD/EUS was not done today due to anesthesia availability. Plan to do it on Monday. NPO post midnight Sunday night. If planning for discharge this weekend, patient would need a temporary feeding tube (Dobhoff tube) for nutrition support and home health. Otherwise we will plan for EGD/EUS with possible PEG placement on Monday. Patient discussed with Dr Keke Martin and agrees to above impression and plan. Thank you for allowing me to participate in this patients care Signed By: Uzma Carmichael NP April 23, 2021

## 2021-04-23 NOTE — PROGRESS NOTES
Discharge order written for home health. Cm met with pt and pt's  Andrez Reagan at the bedside to f/up on pt's plan of care. Cm discussed and educated home health. Pt is agreeable with home health services. Cm presented home health choice list. Pt did not have a preference in a home health company. During the discussion, pt indicated she would like to get a wheelchair. No proference in a DME agency. Cm informed her CM will f/up with the doctor to obtain an order for the wheelchair. Cm presented and discussed IMM. IMM obtained. Referrals made via [facility]. Pt accepted with [facility]. Discharge information uploaded via [employer]. Discharge plan of care/case management plan validated with provider's discharge order.

## 2021-04-23 NOTE — ROUTINE PROCESS
Report given to Jerrod Olson RN oncoming nurse to include sbar, mar, kardex, recent orders and changes

## 2021-04-23 NOTE — PROGRESS NOTES
Hospitalist Progress Note NAME: Nevin Dykes :  1945 MRN:  [id number] Subjective: Chief Complaint / Reason for Physician Visit Patient seen and evaluated at bedside, no acute events overnight, patient currently has no new complaints. Discussed with RN events overnight. Review of Systems: 
Symptom Y/N Comments  Symptom Y/N Comments Fever/Chills N   Chest Pain N Poor Appetite Y   Edema N   
Cough N   Abdominal Pain N Sputum N   Joint Pain N   
SOB/YOUNG N   Pruritis/Rash Y Nausea/vomit N   Tolerating PT/OT NA Diarrhea N   Tolerating Diet Y Constipation N   Other Could NOT obtain due to:   
Patient denies any fevers nausea vomiting lightheadedness dizziness chest pain palpitations headache focal weakness loss of sensation auditory or visual symptoms abdominal stool or urinary complaints or any other associated symptoms. Objective: VITALS:  
Last 24hrs VS reviewed since prior progress note. Most recent are: 
Patient Vitals for the past 24 hrs: 
 Temp Pulse Resp BP SpO2  
21 1545 97.5 °F (36.4 °C) 84 18 133/65 96 % 21 0834 97.5 °F (36.4 °C) 85 18 130/67 98 % 21 0534 98.7 °F (37.1 °C) 84 18 110/60 98 % 21 2054 97.7 °F (36.5 °C) 83 16 (!) 103/57 97 % No intake or output data in the 24 hours ending 21 1756 PHYSICAL EXAM: 
General: Patient appears cachectic however comfortable not in any acute distress EENT:  EOMI. Anicteric sclerae. MMM Resp:  Decreased air entry bilaterally in bilateral lower lung zones CV:  Regular  rhythm, s1 + s2, no murmurs rubs or gallops  No edema GI:  Soft, Non distended, Non tender. +Bowel sounds Neurologic:  Alert and oriented X 3, normal speech, Psych:   Good insight. Not anxious nor agitated Skin:  No rashes. No jaundice Procedures: see electronic medical records for all procedures/Xrays and details which were not copied into this note but were reviewed prior to creation of Plan.   
 
LABS: 
I reviewed today's most current labs and imaging studies. Pertinent labs include: 
Recent Labs  
  04/21/21 
0308 04/20/21 
1550 WBC 16.6* 16.7* HGB 8.3* 8.9* HCT 27.9* 28.9*  
* 604* Recent Labs  
  04/21/21 
0308 04/20/21 
1550  139  
K 3.8 3.4*  
 104 CO2 29 29 GLU 83 93 BUN 15 14 CREA 0.43* 0.54* CA 10.2* 10.0 ALB 2.3* 2.6* TBILI 0.3 0.4 ALT 11* 10* INR  --  1.1 Signed: Jeanmarie Biswas MD 
 
CT neck soft tissue:IMPRESSION 
1.  2.3 x 1.7 x 3.6 cm enhancing mass in the posterior right parotid gland. Smaller soft tissue nodules are present in the inferior left parotid gland with 
an additional nodule suspected in the right parotid gland anterior to the 
dominant mass. Overall, in a patient with suspected lung malignancy findings are 
worrisome for metastatic intraparotid adenopathy. Correlation with PET/CT may be 
helpful as clinically appropriate. 2.  Chronic right maxillary sinusitis CT chest with contrast:IMPRESSION 1. Large mass centered at the interface between the right lower lobe and the 
mediastinum, as detailed above, highly suspicious for neoplasm with leading 
differential consideration of lung cancer. 2. The mass abuts several mediastinal structures, including the esophagus and 
the heart with direct invasion difficult to exclude on this exam. 
3. T8 compression fracture of unknown chronicity. The mass also abuts the 
vertebral body making a pathologic fracture possible. 4. Erosion of the anterior right lateral aspect of the T9 vertebral body which 
is suspicious for direct invasion by the mass. 5. Enlarged right hilar lymph node, suspicious for disease involvement. 6. Indeterminate 4 mm right middle lobe solid nodule. Reviewed most current lab test results and cultures  YES Reviewed most current radiology test results   YES Review and summation of old records today    NO Reviewed patient's current orders and MAR    YES 
PMH/SH reviewed - no change compared to H&P Assessment/plan: 
 
Lung mass/pneumoniapatient presents with above-mentioned symptomatology was found to have a significant lung mass between the right lower lobe as well as mediastinotomy concerning for malignancy given unexpected weight loss as well as possible con commitment infection with evidence of leukocytosis, s/p bronchoscopy/biopsy Follow up blood cultures Follow up sputum cultures Continue Zosyn for antibiotic coverage Pulmonology consult appreciated, Oncology consult appreciated 
  
Anemiapatient presents with above-mentioned symptomatology and was being worked up for anemia as an outpatient with concerns for possible GI bleeding, patient was scheduled to have an outpatient endoscopy Maintain active type and screen Continue to trend hemoglobin and hematocrit Hematology consult appreciated Pt to go for UGI endoscopy Gastroenterology consult 
  
Hypokalemiaresolved 
  
Oral thrushof note patient has evidence of oral thrush upon physical examination 
continue nystatin 4 times daily Continue to monitor Urinary Tract infection - UA consistent with UTI, pt does not meet SIRS/sepsis criteria at this time Follow up blood cultures Follow up urine cultures Continue Zosyn for abx coverage Continue to monitor 
  
ProphylaxisSCDs, currently holding off on Lovenox given concern for possible GI bleed FENcardiac diet, cardiac diet, replete potassium and magnesium Full code, Disposition - pending clinical improvement, Pulmonology/Oncology/Gastroenterology evaluation/clearance________________________________________________________________________ Care Plan discussed with: 
  Comments Patient X Family  X   
RN X   
Care Manager x Consultant  x X Multidiciplinary team rounds were held today with , nursing, pharmacist and clinical coordinator.   Patient's plan of care was discussed; medications were reviewed and discharge planning was addressed. ________________________________________________________________________ Total NON critical care TIME:  35  Minutes Comments >50% of visit spent in counseling and coordination of care X   
________________________________________________________________________ Lynn Osborn MD

## 2021-04-23 NOTE — DISCHARGE SUMMARY
Hospitalist Discharge Summary     Patient ID:  Vikram Nobles  [id number]  46 y.o.  1945 4/20/2021    PCP on record: Viola Kerns DO    Admit date: 4/20/2021  Discharge date and time: 4/23/2021    DISCHARGE DIAGNOSIS:    Lung mass/pneumonia/anemia/hypokalemia/oral thrush    CONSULTATIONS:  IP CONSULT TO HOSPITALIST  IP CONSULT TO PULMONOLOGY  IP CONSULT TO ONCOLOGY  IP CONSULT TO GASTROENTEROLOGY  IP CONSULT TO OTOLARYNGOLOGY  IP CONSULT TO INTERVENTIONAL RADIOLOGY    Excerpted HPI from H&P of Nadia Velazquez MD:  77-year-old female with past medical history of anemia, poor follow-up with physicians presents to [facility] with generalized weakness as well as unexplained weight loss. Patient states over the past few months she has been noticing gradual onset persistent progressive generalized weakness as well as unexplained weight loss following which she presented to the ED. Patient denies any fevers chills nausea vomiting lightheadedness dizziness dyspnea orthopnea paroxysmal nocturnal dyspnea chest pain palpitations headache focal weakness loss sensation auditory or visual symptoms abdominal stool urinary complaints or any other associated symptoms. Patient endorses no recent sick contacts or travel activity    ______________________________________________________________________  DISCHARGE SUMMARY/HOSPITAL COURSE:  for full details see H&P, daily progress notes, labs, consult notes.    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 Of note patient's mobility limitation impairs ability to participate in 1 or more activities such as toileting, feeding, dressing, grooming and bathing and mobility limitation cannot be resolved by use of cane or walker and patient is able to safely use a manual wheelchair and patient's functional mobility deficit can be resolved by the use of a manual wheelchair and patient can maneuver the chair independently or there is a caregiver to assist the patient with maneuvering and the patient has the strength to maneuver the wheelchair.    _______________________________________________________________________  Patient seen and examined by me on discharge day. Pertinent Findings:  Gen:    Not in distress  Chest: Decreased air entry bilaterally in bilateral lower lung zones  CVS:   Regular rhythm, s1/s2 no m/r/g  No edema  Abd:  Soft, not distended, not tender  Neuro:  Alert, Oriented x 4  _______________________________________________________________________  DISCHARGE MEDICATIONS:   Current Discharge Medication List      START taking these medications    Details   acetaminophen (TYLENOL) 325 mg tablet Take 2 Tabs by mouth every six (6) hours as needed for Pain or Fever. Qty: 60 Tab, Refills: 0      melatonin 3 mg tablet Take 2 Tabs by mouth nightly. Qty: 30 Tab, Refills: 0      nystatin (MYCOSTATIN) 100,000 unit/mL suspension Take 5 mL by mouth four (4) times daily.  swish and spit  Qty: 473 mL, Refills: 0      levoFLOXacin (Levaquin) 500 mg tablet Take 1 Tab by mouth daily. Qty: 5 Tab, Refills: 0         CONTINUE these medications which have NOT CHANGED    Details   iron aspgly,[id number]-Ca-suc (FERREX 150 FORTE PLUS) 828-55-02-0 mg-mg-mcg-mg cap cap Take 1 Cap by mouth two (2) times a day. lidocaine HCL (XYLOCAINE) 3 % topical cream Apply  to affected area two (2) times a day. Qty: 85 g, Refills: 0    Associated Diagnoses: Chronic right shoulder pain; Chronic right-sided thoracic back pain      tiZANidine (ZANAFLEX) 2 mg tablet One orally TID for back and shoulder pain  Qty: 60 Tab, Refills: 3    Associated Diagnoses: Chronic right-sided thoracic back pain               Patient Follow Up Instructions: Activity: Activity as tolerated  Diet: Cardiac Diet  Wound Care: As directed    Follow-up with PCP/Pulmonology/Gastroenterology/ENT/Oncology in 2 weeks. Follow-up tests/labs As per above physicians  Follow-up Information     Follow up With Specialties Details Why Contact Info    Viola Luis F, [address] In 1 week  [facility], [place] [facility] 45 [facility]  [phone]      Sari Lomax MD Hematology and Oncology In 1 week  [address] [facility] [address] [facility]      Xiomy Harper MD Gastroenterology In 1 week  [address]      Gertrude Soto MD Otolaryngology In 1 week  [address] [place] [address]  [phone]      Alonso Peters MD Pulmonary Disease In 1 week  [facility] [address] [facility]  [phone]          ________________________________________________________________    Risk of deterioration: Low    Condition at Discharge:  Stable  __________________________________________________________________    Disposition  Home with family, no needs    ____________________________________________________________________    Code Status: Full Code  ___________________________________________________________________      Total time in minutes spent coordinating this discharge (includes going over instructions, follow-up, prescriptions, and preparing report for sign off to her PCP) :  50 minutes    Signed:   Kathy Rodarte MD

## 2021-04-23 NOTE — PROGRESS NOTES
Pulmonary Progress Note NAME: Blair Solorio :  1945 MRM:  [id number] Date/Time: 2021  5:38 PM 
 
 
  
Subjective:  
 
Patient evaluated in her room. Discussed with the daughter at the bedside. She is awake and alert. However she is for EGD and EUS by Dr. Whitaker Him today. She has not coughed up any blood. Remains on room air. Past Medical History reviewed and unchanged from Admission History and Physical 
 
  
Objective:  
 
Physical Exam  
 
Vitals:  
  
Last 24hrs VS reviewed since prior progress note. Most recent are: 
 
Visit Vitals /67 (BP 1 Location: Left upper arm, BP Patient Position: At rest) Pulse 85 Temp 97.5 °F (36.4 °C) Resp 18 Ht 5' 8\" (1.727 m) Wt 37.2 kg (81 lb 15.8 oz) SpO2 98% Breastfeeding No  
BMI 12.47 kg/m² SpO2 Readings from Last 6 Encounters:  
21 98% 21 96% 21 97% No intake or output data in the 24 hours ending 21 1536 GENERAL:  The patient is an elderly, frail  female who does not appear to be in any distress. Saturation is 97% on room air. HEENT:  Showed pupils are equal and reactive to light. Pallor is noted. No icterus. Nasal passages are patent. Oropharynx is without obvious thrush. NECK:  Supple. Trachea is central.  No JVD or lymphadenopathy. The patient stated that she had a lump on the right side, which has grown away. She is not using any accessory muscles of respiration. However, she has had significant weight loss, with prominent supraclavicular fossa. CHEST:  Symmetrical with equal and fair air entry bilaterally. Breath sounds are diminished in general.    No wheezing or rhonchi today. No chest wall tenderness. HEART:  Rhythm is regular without any loud murmur or gallop. ABDOMEN:  Soft and benign. Bowel sounds are audible. No masses or organomegaly. EXTREMITIES:  Did not show any cyanosis or clubbing. No pitting edema. Pulses are palpable.  
NEUROLOGIC: Grossly intact. SKIN:  Warm and dry. XR CHEST PORT Final Result XR FLUOROSCOPY UNDER 60 MINUTES Final Result CT NECK SOFT TISSUE W CONT Final Result 1.  2.3 x 1.7 x 3.6 cm enhancing mass in the posterior right parotid gland. Smaller soft tissue nodules are present in the inferior left parotid gland with  
an additional nodule suspected in the right parotid gland anterior to the  
dominant mass. Overall, in a patient with suspected lung malignancy findings are  
worrisome for metastatic intraparotid adenopathy. Correlation with PET/CT may be  
helpful as clinically appropriate. 2.  Chronic right maxillary sinusitis. CT CHEST W CONT Final Result 1. Large mass centered at the interface between the right lower lobe and the  
mediastinum, as detailed above, highly suspicious for neoplasm with leading  
differential consideration of lung cancer. 2. The mass abuts several mediastinal structures, including the esophagus and  
the heart with direct invasion difficult to exclude on this exam.  
3. T8 compression fracture of unknown chronicity. The mass also abuts the  
vertebral body making a pathologic fracture possible. 4. Erosion of the anterior right lateral aspect of the T9 vertebral body which  
is suspicious for direct invasion by the mass. 5. Enlarged right hilar lymph node, suspicious for disease involvement. 6. Indeterminate 4 mm right middle lobe solid nodule. XR CHEST PORT Final Result 1. Right hilar fullness and increased density with suspicion for potential right  
hilar mass. Recommend further evaluation with contrast enhanced chest CT. 2. See above for additional comments. The above was discussed with and acknowledged by Dr. Estelle Munson by telephone  
on  4/20/2021 4:09 PM.  
  
 
 
Lab Data Reviewed: (see below) Medications: 
Current Facility-Administered Medications Medication Dose Route Frequency  potassium chloride (KLOR-CON) packet for solution 40 mEq  40 mEq Oral NOW  EPINEPHrine (ADRENALIN) 0.1 mg/mL syringe    PRN  
 lidocaine (XYLOCAINE) 10 mg/mL (1 %) injection    PRN  mineral oil (topical)    PRN  
 albuterol-ipratropium (DUO-NEB) 2.5 MG-0.5 MG/3 ML  3 mL Nebulization Q4H PRN  
 melatonin tablet 6 mg  6 mg Oral QHS  nystatin (MYCOSTATIN) 100,000 unit/mL oral suspension 500,000 Units  500,000 Units Oral QID  multivitamin, tx-iron-ca-min (THERA-M w/ IRON) tablet 1 Tab  1 Tab Oral DAILY  lidocaine (XYLOCAINE) 4 % cream   Topical BID  cyclobenzaprine (FLEXERIL) tablet 10 mg  10 mg Oral TID  sodium chloride (NS) flush 5-40 mL  5-40 mL IntraVENous Q8H  
 sodium chloride (NS) flush 5-40 mL  5-40 mL IntraVENous PRN  
 acetaminophen (TYLENOL) tablet 650 mg  650 mg Oral Q6H PRN Or  
 acetaminophen (TYLENOL) suppository 650 mg  650 mg Rectal Q6H PRN  polyethylene glycol (MIRALAX) packet 17 g  17 g Oral DAILY PRN  promethazine (PHENERGAN) tablet 12.5 mg  12.5 mg Oral Q6H PRN Or  
 ondansetron (ZOFRAN) injection 4 mg  4 mg IntraVENous Q6H PRN  piperacillin-tazobactam (ZOSYN) 3.375 g in 0.9% sodium chloride (MBP/ADV) 100 mL MBP  3.375 g IntraVENous Q8H  
 
 
______________________________________________________________________ Lab Review:  
 
Recent Labs  
  04/23/21 
0543 04/22/21 
8687 04/21/21 
0308 WBC 15.0* 17.0* 16.6* HGB 8.5* 9.5* 8.3* HCT 28.1* 31.8* 27.9*  
* 549* 541* Recent Labs  
  04/23/21 
0543 04/22/21 
0657 04/21/21 
0308 04/20/21 
1550  135* 140 139  
K 3.4* 4.5 3.8 3.4*  
 106 107 104 CO2 32 23 29 29 GLU 98 82 83 93 BUN 13 12 15 14 CREA 0.47* 0.48* 0.43* 0.54* CA 10.6* 10.2* 10.2* 10.0 MG 2.3 2.2  --   --   
ALB  --   --  2.3* 2.6* ALT  --   --  11* 10* INR  --   --   --  1.1 No components found for: [place] No results for input(s): PH, PCO2, PO2, HCO3, FIO2 in the last 72 hours. Recent Labs  
  04/20/21 
1550 INR 1.1 Other pertinent lab:  
CT of the chest done on 04/20/2021 was personally reviewed. She has a large right lower lobe medially located heterogeneous mass, which measures 8.1 cm in maximum dimension. There is also T8 compression fracture. Increased right hilar lymphadenopathy is described. Influenza A and B is negative. COVID-19 rapid test is negative. Assessment & Plan: 1. A 42-year-old  female with a history of extensive and ongoing tobacco use presenting with weight loss and back pain. She has a large mass in the right lower lobe located medially adjacent to the mediastinum and probably involving the mediastinum. It is measuring 8.1 cm in size. It is heterogeneous. There is right hilar lymphadenopathy. CT images were personally reviewed with the radiologist.  Most likely, it appears to be lung cancer. There also appears to be T8 compression fracture causing significant back pain. The patient underwent bronchoscopy on 4/22. There was extrinsic compression noted in the distal bronchus intermedius. However I was able to pass the bronchoscope in the right middle lobe and the right lower lobe segments. Biopsies were taken from this area. 2 transbronchial biopsies were also taken. Cytology brushings and washings were obtained. I discussed with Dr. Wilde, pathologist today. Preliminary report shows no evidence of malignancy. I discussed with Dr. Fallon Red. He will proceed with EGD and EUS with biopsies today. Also discussed with the daughter in detail. 2.  Chronic obstructive pulmonary disease. There are significant emphysematous changes on the CT scan. Continue with nebulized bronchodilator treatments at this time. She is comfortable on room air. 3.  Back pain. This is due to compression fracture which may be pathological fractures. Advocate judicious pain control. Hematology/Oncology consulted. 4.  Anemia.  
5.  For deep vein thrombosis prophylaxis, on sequential compression devices to the lower extremities.   
  
Thank you for allowing me to participate in the care of this patient. Agree with empiric antibiotics including Zosyn. I will follow the patient closely with you. Discussed with the daughter, who is asking about discharge plans. The patient is on room air and from pulmonary standpoint the patient can be discharged home on oral Augmentin to finish 7 days of treatment and as needed bronchodilator treatments with albuterol/Atrovent. I will be happy to follow her as outpatient. More than 30 minutes spent with patient care. Kristine Steele MD

## 2021-04-23 NOTE — OP NOTES
[address] [facility] 
OPERATIVE REPORT Name:  Josselin Brooke 
MR#:  [id number] :  1945 ACCOUNT #:  [de-identified] DATE OF SERVICE:  2021 ATTENDING PHYSICIAN:  Rufina Rudolph MD 
 
PREOPERATIVE DIAGNOSIS:  A 51-year-old  female presenting with significant weight loss and right lower lobe mass located medially, measuring 8 cm in maximum size. Need for diagnostic bronchoscopy. POSTOPERATIVE DIAGNOSIS:  A 51-year-old  female presenting with significant weight loss and right lower lobe mass located medially, measuring 8 cm in maximum size. Need for diagnostic bronchoscopy. PROCEDURE PERFORMED:  Flexible bronchoscopy under anesthesia with washings, brushings, and biopsies. SURGEON:  Candida Van MD 
 
ANESTHESIA:  The patient was given general anesthesia using LMA. Please see separate Anesthesia notes. COMPLICATIONS:  None. Chest x-ray is pending. SPECIMENS REMOVED: 
1. Cytology brushings x3 from the bronchus intermedius, right middle lobe medial segment and also from the right lower lobe. 2.  Bronchial washings, mainly from the right lung. 3.  Endobronchial biopsies x2 from the bronchus intermedius. 4.  Transbronchial biopsies x2 from the right lower lobe. ESTIMATED BLOOD LOSS:  About 5 mL PROCEDURE:  The patient was duly identified in the bronchoscopy suite. Consent for the procedure was previously obtained. The risks, complications, alternatives, and benefits were explained in detail. Time-out was conducted. She was given general anesthesia using LMA. Please see separate Anesthesia notes. The bronchoscope was lubricated with mineral oil and passed through the bronchoscopy adapter and into the LMA. I could see the vocal cords which appeared normal.  Lidocaine solution was instilled above the vocal cords. The trachea was then entered. More lidocaine solution was instilled in the trachea and above the main gregg.   The main gregg appeared to be normal and sharp. There were some mucoid secretions which were lavaged. The trachea appeared normal as well. The left lung was first inspected. It was endobronchoscopically normal.  No endobronchial lesions or extrinsic compression. Mostly normal anatomy. Thereafter, attention was focused in the right lung. Right main bronchus was normal.  Right upper lobe was normal, although with some mucoid secretions which were lavaged. Thereafter, attention was focused on the bronchus intermedius. The distal bronchus intermedius was stenotic, but the bronchoscope would pass into the right middle lobe as well as the lower lobe segments. The mucosa was edematous and bled easily. It appears to be involved by the tumor. The bifurcations were blunted. I first obtained endobronchial biopsy at the bifurcation of the bronchus intermedius. The patient did bleed after the first biopsy. I put epinephrine and iced saline. Thereafter, I took three brushings, one from the right middle lobe, one from the bronchus intermedius, and one from the right lower lobe. Thereafter, I took more biopsies, one endobronchial from the distal bronchus intermedius, another one transbronchially using fluoroscopy from the medial segment of the right middle lobe, and another one from the right lower lobe as medially as I could go. However, I could not find any right lower lobe segment that would lead me to the tumor which could be seen just inferior to the right hilum. There was some more hemorrhage and it was secured by instilling more epinephrine and iced saline solution. Securing good hemostasis, the bronchoscope was withdrawn and the procedure was terminated. Altogether, the patient tolerated the procedure fairly well. She will be recovered in the recovery room by Anesthesia. A stat portable chest x-ray is ordered and pending.   The specimens will be sent for appropriate analysis including microbiology, cytology, and pathology. I also discussed with the daughter, Joce Torres, prior to the procedure and after the procedure. Pictures taken during bronchoscopy were shown to the daughter. Андрей Brown MD 
 
 
[id number] 
D:  04/22/2021 14:22 T:  04/23/2021 0:03 JOB #:  [id number] CC:   Milka Rodriguez MD

## 2021-04-24 NOTE — PROGRESS NOTES
Hematology and Oncology Progress Note Patient: Roddy Villar MRN: [id number]  SSN: [id number] YOB: 1945  Age: 76 y.o. Sex: female Admit Date: 4/20/2021 LOS: 3 days Chief Complaint: Patient has dyspnea Subjective:  
Patient is feeling tired. She has no mucosal bleeding. She is eager to go home. Objective:  
 
Vitals:  
 04/22/21 2054 04/23/21 4784 04/23/21 0834 04/23/21 1545 BP: (!) 103/57 110/60 130/67 133/65 Pulse: 83 84 85 84 Resp: 16 18 18 18 Temp: 97.7 °F (36.5 °C) 98.7 °F (37.1 °C) 97.5 °F (36.4 °C) 97.5 °F (36.4 °C) SpO2: 97% 98% 98% 96% Weight:   37.2 kg (81 lb 15.8 oz) Height:      
  
Physical Exam: 
Constitutional: Elderly white female looks chronically ill. Not in any acute distress or pain. Eyes: Sclerae anicteric. Conjunctivae shows pallor. ENMT: Has mask on. Patient has bitemporal wasting. She also has slight puffiness around her eyes. Neck: No adenopathy, JVD or thyromegaly. Respiratory: Lungs are clear bilaterally. Cardiovascular: Normal sinus rhythm. Abdomen: Scaphoid abdomen. Soft, nontender, no hepatosplenomegaly. No guarding or rigidity. Bowel sounds present. Extremities: She does have bilateral lower extremity swelling. Skin: No petechiae; no skin rash. Neurologic: Alert/oriented x 3. Lab/Data Review: 
 
Recent Results (from the past 24 hour(s)) METABOLIC PANEL, BASIC Collection Time: 04/23/21  5:43 AM  
Result Value Ref Range Sodium 140 136 - 145 mmol/L Potassium 3.4 (L) 3.5 - 5.1 mmol/L Chloride 104 97 - 108 mmol/L  
 CO2 32 21 - 32 mmol/L Anion gap 4 (L) 5 - 15 mmol/L Glucose 98 65 - 100 mg/dL BUN 13 6 - 20 mg/dL Creatinine 0.47 (L) 0.55 - 1.02 mg/dL BUN/Creatinine ratio 28 (H) 12 - 20 GFR est AA >60 >60 ml/min/1.73m2 GFR est non-AA >60 >60 ml/min/1.73m2 Calcium 10.6 (H) 8.5 - 10.1 mg/dL MAGNESIUM Collection Time: 04/23/21  5:43 AM  
Result Value Ref Range Magnesium 2.3 1.6 - 2.4 mg/dL CBC WITH AUTOMATED DIFF Collection Time: 04/23/21  5:43 AM  
Result Value Ref Range WBC 15.0 (H) 3.6 - 11.0 K/uL  
 RBC 3.39 (L) 3.80 - 5.20 M/uL HGB 8.5 (L) 11.5 - 16.0 g/dL HCT 28.1 (L) 35.0 - 47.0 % MCV 82.9 80.0 - 99.0 FL  
 MCH 25.1 (L) 26.0 - 34.0 PG  
 MCHC 30.2 30.0 - 36.5 g/dL  
 RDW 17.6 (H) 11.5 - 14.5 % PLATELET 657 (H) 508 - 400 K/uL MPV 8.7 (L) 8.9 - 12.9 FL  
 NEUTROPHILS 92 (H) 32 - 75 % LYMPHOCYTES 5 (L) 12 - 49 % MONOCYTES 3 (L) 5 - 13 % EOSINOPHILS 0 0 - 7 % BASOPHILS 0 0 - 1 % IMMATURE GRANULOCYTES 0 0.0 - 0.5 % ABS. NEUTROPHILS 13.8 (H) 1.8 - 8.0 K/UL  
 ABS. LYMPHOCYTES 0.8 0.8 - 3.5 K/UL  
 ABS. MONOCYTES 0.4 0.0 - 1.0 K/UL  
 ABS. EOSINOPHILS 0.0 0.0 - 0.4 K/UL  
 ABS. BASOPHILS 0.0 0.0 - 0.1 K/UL  
 ABS. IMM. GRANS. 0.0 0.00 - 0.04 K/UL  
 DF AUTOMATED    
CBC WITH AUTOMATED DIFF Collection Time: 04/23/21  7:20 PM  
Result Value Ref Range WBC 20.6 (H) 3.6 - 11.0 K/uL  
 RBC 3.53 (L) 3.80 - 5.20 M/uL HGB 8.9 (L) 11.5 - 16.0 g/dL HCT 29.5 (L) 35.0 - 47.0 % MCV 83.6 80.0 - 99.0 FL  
 MCH 25.2 (L) 26.0 - 34.0 PG  
 MCHC 30.2 30.0 - 36.5 g/dL  
 RDW 17.8 (H) 11.5 - 14.5 % PLATELET 887 (H) 179 - 400 K/uL MPV 8.7 (L) 8.9 - 12.9 FL  
 NEUTROPHILS 92 (H) 32 - 75 % LYMPHOCYTES 4 (L) 12 - 49 % MONOCYTES 4 (L) 5 - 13 % EOSINOPHILS 0 0 - 7 % BASOPHILS 0 0 - 1 % IMMATURE GRANULOCYTES 0 0.0 - 0.5 % ABS. NEUTROPHILS 19.0 (H) 1.8 - 8.0 K/UL  
 ABS. LYMPHOCYTES 0.8 0.8 - 3.5 K/UL  
 ABS. MONOCYTES 0.8 0.0 - 1.0 K/UL  
 ABS. EOSINOPHILS 0.0 0.0 - 0.4 K/UL  
 ABS. BASOPHILS 0.0 0.0 - 0.1 K/UL  
 ABS. IMM. GRANS. 0.0 0.00 - 0.04 K/UL  
 DF AUTOMATED Assessment and plan:  
 
68-year-old white female who has shortness of breath dysphagia and weight loss. Patient had CT of chest which shows right hilar mass with hilar adenopathy and possible T8 vertebral erosion.  Obviously this is malignancy unless proven otherwise.  
-Patient's bronchoscopy specimen was nondiagnostic so patient underwent EGD and EUS by Dr. Fallon Red. Will wait for surgical pathology. 
-Patient has lost lots of weight lately which is a bad prognostic sign for her possible lung cancer. 
-Based on surgical pathology will make further recommendations. 
-Patient has leukocytosis and thrombocytosis both are reactive possibly from malignancy. She had hematologic work-up and it was negative for any myeloproliferative disorders. This dictation was done by dragon, computer voice recognition software. Often unanticipated grammatical, syntax, phones and other interpretive errors are inadvertently transcribed. Please excuse errors that have escaped final proofreading. Signed By: Fahad Chi MD   
 April 23, 2021

## 2021-04-24 NOTE — PROGRESS NOTES
Verbal shift change report given to Anita Simons.  (oncoming nurse) by Berna Gibbs (offgoing nurse). Report included the following information SBAR, MAR and Recent Results.

## 2021-04-24 NOTE — PROGRESS NOTES
Progress Note Patient: Naldo Sweeney MRN: [id number]  SSN: [id number] YOB: 1945  Age: 76 y.o. Sex: female Admit Date: 4/20/2021 LOS: 4 days Subjective:  
 
Patient is comfortable she was drinking water and told me that she is going to make extra effort to drink Ensure to maintain her nutrition and hydration. She is comfortable and pain-free. Her endoscopic ultrasound and PEG tube placement is scheduled for Monday. Objective:  
 
Vitals:  
 04/23/21 4689 04/23/21 1545 04/23/21 2106 04/24/21 0745 BP: 130/67 133/65 (!) 112/56 (!) 143/80 Pulse: 85 84 100 100 Resp: 18 18 18 19 Temp: 97.5 °F (36.4 °C) 97.5 °F (36.4 °C) 97.7 °F (36.5 °C) 97.8 °F (36.6 °C) SpO2: 98% 96% 95% 97% Weight: 37.2 kg (81 lb 15.8 oz) Height:      
  
 
Intake and Output: 
Current Shift: No intake/output data recorded. Last three shifts: No intake/output data recorded. Physical Exam:  
Skin:  Extremities and face reveal no rashes. No callaway erythema. HEENT: Sclerae anicteric. Extra-occular muscles are intact. No abnormal pigmentation of the lips. The neck is supple. Cardiovascular: Regular rate and rhythm. Respiratory:  Comfortable breathing with no accessory muscle use. GI:  Abdomen nondistended, soft, and nontender. No enlargement of the liver or spleen. No masses palpable. Rectal:  Deferred Musculoskeletal: Extremities have good range of motion. Neurological:  Gross memory appears intact. Patient is alert and oriented. Psychiatric:  Mood appears appropriate with judgement intact. Lymphatic:  No visible adenopathy Lab/Data Review: 
Recent Results (from the past 24 hour(s)) CBC WITH AUTOMATED DIFF Collection Time: 04/23/21  7:20 PM  
Result Value Ref Range WBC 20.6 (H) 3.6 - 11.0 K/uL  
 RBC 3.53 (L) 3.80 - 5.20 M/uL HGB 8.9 (L) 11.5 - 16.0 g/dL HCT 29.5 (L) 35.0 - 47.0 %  MCV 83.6 80.0 - 99.0 FL  
 MCH 25.2 (L) 26.0 - 34.0 PG  
 MCHC 30.2 30.0 - 36.5 g/dL  
 RDW 17.8 (H) 11.5 - 14.5 % PLATELET 443 (H) 137 - 400 K/uL MPV 8.7 (L) 8.9 - 12.9 FL  
 NEUTROPHILS 92 (H) 32 - 75 % LYMPHOCYTES 4 (L) 12 - 49 % MONOCYTES 4 (L) 5 - 13 % EOSINOPHILS 0 0 - 7 % BASOPHILS 0 0 - 1 % IMMATURE GRANULOCYTES 0 0.0 - 0.5 % ABS. NEUTROPHILS 19.0 (H) 1.8 - 8.0 K/UL  
 ABS. LYMPHOCYTES 0.8 0.8 - 3.5 K/UL  
 ABS. MONOCYTES 0.8 0.0 - 1.0 K/UL  
 ABS. EOSINOPHILS 0.0 0.0 - 0.4 K/UL  
 ABS. BASOPHILS 0.0 0.0 - 0.1 K/UL  
 ABS. IMM. GRANS. 0.0 0.00 - 0.04 K/UL  
 DF AUTOMATED METABOLIC PANEL, COMPREHENSIVE Collection Time: 04/23/21  7:20 PM  
Result Value Ref Range Sodium 140 136 - 145 mmol/L Potassium 3.8 3.5 - 5.1 mmol/L Chloride 105 97 - 108 mmol/L  
 CO2 31 21 - 32 mmol/L Anion gap 4 (L) 5 - 15 mmol/L Glucose 106 (H) 65 - 100 mg/dL BUN 14 6 - 20 mg/dL Creatinine 0.63 0.55 - 1.02 mg/dL BUN/Creatinine ratio 22 (H) 12 - 20 GFR est AA >60 >60 ml/min/1.73m2 GFR est non-AA >60 >60 ml/min/1.73m2 Calcium 11.1 (H) 8.5 - 10.1 mg/dL Bilirubin, total 0.4 0.2 - 1.0 mg/dL AST (SGOT) 10 (L) 15 - 37 U/L  
 ALT (SGPT) 12 12 - 78 U/L Alk. phosphatase 112 45 - 117 U/L Protein, total 6.9 6.4 - 8.2 g/dL Albumin 2.5 (L) 3.5 - 5.0 g/dL Globulin 4.4 (H) 2.0 - 4.0 g/dL A-G Ratio 0.6 (L) 1.1 - 2.2 XR CHEST PORT Final Result XR FLUOROSCOPY UNDER 60 MINUTES Final Result CT NECK SOFT TISSUE W CONT Final Result 1.  2.3 x 1.7 x 3.6 cm enhancing mass in the posterior right parotid gland. Smaller soft tissue nodules are present in the inferior left parotid gland with  
an additional nodule suspected in the right parotid gland anterior to the  
dominant mass. Overall, in a patient with suspected lung malignancy findings are  
worrisome for metastatic intraparotid adenopathy. Correlation with PET/CT may be  
helpful as clinically appropriate. 2.  Chronic right maxillary sinusitis.   
  
CT CHEST W CONT Final Result 1. Large mass centered at the interface between the right lower lobe and the  
mediastinum, as detailed above, highly suspicious for neoplasm with leading  
differential consideration of lung cancer. 2. The mass abuts several mediastinal structures, including the esophagus and  
the heart with direct invasion difficult to exclude on this exam.  
3. T8 compression fracture of unknown chronicity. The mass also abuts the  
vertebral body making a pathologic fracture possible. 4. Erosion of the anterior right lateral aspect of the T9 vertebral body which  
is suspicious for direct invasion by the mass. 5. Enlarged right hilar lymph node, suspicious for disease involvement. 6. Indeterminate 4 mm right middle lobe solid nodule. XR CHEST PORT Final Result 1. Right hilar fullness and increased density with suspicion for potential right  
hilar mass. Recommend further evaluation with contrast enhanced chest CT. 2. See above for additional comments. The above was discussed with and acknowledged by Dr. Orquidea Jackson by telephone  
on  4/20/2021 4:09 PM. Assessment:  
 
Active Problems: 
  Lung mass (4/20/2021) Pneumonia (4/20/2021) Mediastinal mass Plan:  
Plan to do endoscopic ultrasound with fine-needle aspiration on Monday as well as a PEG tube placement. Hold off any anticoagulants and keep her n.p.o. after midnight on Sunday Thank you for allowing me to participate in this patients care Signed By: Senia Fitzpatrick MD   
 April 24, 2021

## 2021-04-24 NOTE — MANAGEMENT PLAN
CM met with patient at bedside regarding a hospice eval after CM was notified by nurse JOSUÉ that Dr. Lenny Juares had requested CM meet with patient regarding hospice. Patient was already aware regarding hospice eval upon CM visit. Patient was agreeable to info session with [facility]. CM has sent referral via [facility] to [facility]. CM has notified Rolando Anna with [facility] of referral.  
 
CM has requested a Hospice Eval And Treat order from Dr. Lenny Juares. CM will continue to follow for discharge planning.

## 2021-04-24 NOTE — HOSPICE
[facility] Good Help to Those in Need 
[phone] Patient Name: Lucrecia Lyle YOB: 1945 Age: 76 y.o. [facility] RN Note:  Hospice consult received, reviewing chart. Will follow up with Unit Nurse and Care Manager to discuss plan of care, patient status and discharge disposition within the hour. Thank you for the opportunity to be of service to this patient. Met with patient and spoke about our services. She continues to nod off due to pain meds and is very focused on care to clean the litter box and cook meals. I told her we have SW that can help her find someone. She said her daughter has been helping her and her son works a lot. I asked if I could reach out to her daughter and she said she will be here tomorrow. Hope to meet with them then. Thank you Remedios Johnston RN 
[phone] c 
[phone] o

## 2021-04-24 NOTE — PROGRESS NOTES
Hematology and Oncology Progress Note Patient: Uli Arreola MRN: [id number]  SSN: [id number] YOB: 1945  Age: 76 y.o. Sex: female Admit Date: 4/20/2021 LOS: 4 days Chief Complaint: Patient has dyspnea Subjective:  
Patient is having shoulder pain and feeling tired. She has poor appetite. She has tried Megace but it did not work. Objective:  
 
Vitals:  
 04/23/21 8640 04/23/21 1545 04/23/21 2106 04/24/21 0745 BP: 130/67 133/65 (!) 112/56 (!) 143/80 Pulse: 85 84 100 100 Resp: 18 18 18 19 Temp: 97.5 °F (36.4 °C) 97.5 °F (36.4 °C) 97.7 °F (36.5 °C) 97.8 °F (36.6 °C) SpO2: 98% 96% 95% 97% Weight: 37.2 kg (81 lb 15.8 oz) Height:      
  
Physical Exam: 
Constitutional: Elderly white female looks chronically ill. Not in any acute distress or pain. Eyes: Sclerae anicteric. Conjunctivae shows pallor. ENMT: Has mask on. Patient has bitemporal wasting. She also has slight puffiness around her eyes. Neck: No adenopathy, JVD or thyromegaly. Respiratory: Lungs are clear bilaterally. Cardiovascular: Normal sinus rhythm. Extremities: She does have bilateral lower extremity swelling. Skin: No petechiae; no skin rash. Neurologic: Alert/oriented x 3. Lab/Data Review: 
 
Recent Results (from the past 24 hour(s)) CBC WITH AUTOMATED DIFF Collection Time: 04/23/21  7:20 PM  
Result Value Ref Range WBC 20.6 (H) 3.6 - 11.0 K/uL  
 RBC 3.53 (L) 3.80 - 5.20 M/uL HGB 8.9 (L) 11.5 - 16.0 g/dL HCT 29.5 (L) 35.0 - 47.0 % MCV 83.6 80.0 - 99.0 FL  
 MCH 25.2 (L) 26.0 - 34.0 PG  
 MCHC 30.2 30.0 - 36.5 g/dL  
 RDW 17.8 (H) 11.5 - 14.5 % PLATELET 868 (H) 265 - 400 K/uL MPV 8.7 (L) 8.9 - 12.9 FL  
 NEUTROPHILS 92 (H) 32 - 75 % LYMPHOCYTES 4 (L) 12 - 49 % MONOCYTES 4 (L) 5 - 13 % EOSINOPHILS 0 0 - 7 % BASOPHILS 0 0 - 1 % IMMATURE GRANULOCYTES 0 0.0 - 0.5 % ABS. NEUTROPHILS 19.0 (H) 1.8 - 8.0 K/UL  
 ABS.  LYMPHOCYTES 0.8 0.8 - 3.5 K/UL  
 ABS. MONOCYTES 0.8 0.0 - 1.0 K/UL  
 ABS. EOSINOPHILS 0.0 0.0 - 0.4 K/UL  
 ABS. BASOPHILS 0.0 0.0 - 0.1 K/UL  
 ABS. IMM. GRANS. 0.0 0.00 - 0.04 K/UL  
 DF AUTOMATED METABOLIC PANEL, COMPREHENSIVE Collection Time: 04/23/21  7:20 PM  
Result Value Ref Range Sodium 140 136 - 145 mmol/L Potassium 3.8 3.5 - 5.1 mmol/L Chloride 105 97 - 108 mmol/L  
 CO2 31 21 - 32 mmol/L Anion gap 4 (L) 5 - 15 mmol/L Glucose 106 (H) 65 - 100 mg/dL BUN 14 6 - 20 mg/dL Creatinine 0.63 0.55 - 1.02 mg/dL BUN/Creatinine ratio 22 (H) 12 - 20 GFR est AA >60 >60 ml/min/1.73m2 GFR est non-AA >60 >60 ml/min/1.73m2 Calcium 11.1 (H) 8.5 - 10.1 mg/dL Bilirubin, total 0.4 0.2 - 1.0 mg/dL AST (SGOT) 10 (L) 15 - 37 U/L  
 ALT (SGPT) 12 12 - 78 U/L Alk. phosphatase 112 45 - 117 U/L Protein, total 6.9 6.4 - 8.2 g/dL Albumin 2.5 (L) 3.5 - 5.0 g/dL Globulin 4.4 (H) 2.0 - 4.0 g/dL A-G Ratio 0.6 (L) 1.1 - 2.2 Assessment and plan:  
 
22-year-old white female who has shortness of breath dysphagia and weight loss. Patient had CT of chest which shows right hilar mass with hilar adenopathy and possible T8 vertebral erosion. Obviously this is malignancy unless proven otherwise.  
-Patient's bronchoscopy specimen was nondiagnostic so patient was supposed to go for EGD and EUS directed biopsy by Dr. Tessa Rodriguez but because of no anesthesia coverage available patient's procedure has been delayed to Monday. Discussed with Dr. Tessa Rodriguez. 
-Patient has lost lots of weight lately which is a bad prognostic sign for her possible lung cancer. 
-Discussed with patient about Marinol which I will start on outpatient basis. -Based on surgical pathology will make further recommendations. 
-Patient has leukocytosis and thrombocytosis both are reactive possibly from malignancy. She had hematologic work-up and it was negative for any myeloproliferative disorders.  
 
This dictation was done by lencho computer voice recognition software. Often unanticipated grammatical, syntax, phones and other interpretive errors are inadvertently transcribed. Please excuse errors that have escaped final proofreading. Signed By: Audra Lindquist MD   
 April 24, 2021

## 2021-04-24 NOTE — PROGRESS NOTES
Hospitalist Progress Note NAME: Tadeo Armendariz :  1945 MRN:  [id number] Subjective: Chief Complaint / Reason for Physician Visit Patient seen and evaluated at bedside, no acute events overnight, patient currently has no new complaints. Discussed with RN events overnight. Review of Systems: 
Symptom Y/N Comments  Symptom Y/N Comments Fever/Chills N   Chest Pain N Poor Appetite Y   Edema N   
Cough N   Abdominal Pain N Sputum N   Joint Pain N   
SOB/YOUNG N   Pruritis/Rash Y Nausea/vomit N   Tolerating PT/OT NA Diarrhea N   Tolerating Diet Y Constipation N   Other Could NOT obtain due to:   
Patient denies any fevers nausea vomiting lightheadedness dizziness chest pain palpitations headache focal weakness loss of sensation auditory or visual symptoms abdominal stool or urinary complaints or any other associated symptoms. Objective: VITALS:  
Last 24hrs VS reviewed since prior progress note. Most recent are: 
Patient Vitals for the past 24 hrs: 
 Temp Pulse Resp BP SpO2  
21 0745 97.8 °F (36.6 °C) 100 19 (!) 143/80 97 % 21 2106 97.7 °F (36.5 °C) 100 18 (!) 112/56 95 % 21 1545 97.5 °F (36.4 °C) 84 18 133/65 96 % No intake or output data in the 24 hours ending 21 1144 PHYSICAL EXAM: 
General: Patient appears cachectic however comfortable not in any acute distress EENT:  EOMI. Anicteric sclerae. MMM Resp:  Decreased air entry bilaterally in bilateral lower lung zones CV:  Regular  rhythm, s1 + s2, no murmurs rubs or gallops  No edema GI:  Soft, Non distended, Non tender. +Bowel sounds Neurologic:  Alert and oriented X 3, normal speech, Psych:   Good insight. Not anxious nor agitated Skin:  No rashes. No jaundice Procedures: see electronic medical records for all procedures/Xrays and details which were not copied into this note but were reviewed prior to creation of Plan.    
 
LABS: 
I reviewed today's most current labs and imaging studies. Pertinent labs include: 
Recent Labs  
  04/21/21 
0308 04/20/21 
1550 WBC 16.6* 16.7* HGB 8.3* 8.9* HCT 27.9* 28.9*  
* 604* Recent Labs  
  04/21/21 
0308 04/20/21 
1550  139  
K 3.8 3.4*  
 104 CO2 29 29 GLU 83 93 BUN 15 14 CREA 0.43* 0.54* CA 10.2* 10.0 ALB 2.3* 2.6* TBILI 0.3 0.4 ALT 11* 10* INR  --  1.1 Signed: Shalom Damian MD 
 
CT neck soft tissue:IMPRESSION 
1.  2.3 x 1.7 x 3.6 cm enhancing mass in the posterior right parotid gland. Smaller soft tissue nodules are present in the inferior left parotid gland with 
an additional nodule suspected in the right parotid gland anterior to the 
dominant mass. Overall, in a patient with suspected lung malignancy findings are 
worrisome for metastatic intraparotid adenopathy. Correlation with PET/CT may be 
helpful as clinically appropriate. 2.  Chronic right maxillary sinusitis CT chest with contrast:IMPRESSION 1. Large mass centered at the interface between the right lower lobe and the 
mediastinum, as detailed above, highly suspicious for neoplasm with leading 
differential consideration of lung cancer. 2. The mass abuts several mediastinal structures, including the esophagus and 
the heart with direct invasion difficult to exclude on this exam. 
3. T8 compression fracture of unknown chronicity. The mass also abuts the 
vertebral body making a pathologic fracture possible. 4. Erosion of the anterior right lateral aspect of the T9 vertebral body which 
is suspicious for direct invasion by the mass. 5. Enlarged right hilar lymph node, suspicious for disease involvement. 6. Indeterminate 4 mm right middle lobe solid nodule. Reviewed most current lab test results and cultures  YES Reviewed most current radiology test results   YES Review and summation of old records today    NO Reviewed patient's current orders and MAR    YES 
PMH/SH reviewed - no change compared to H&P Assessment/plan: 
 
Lung mass/pneumoniapatient presents with above-mentioned symptomatology was found to have a significant lung mass between the right lower lobe as well as mediastinotomy concerning for malignancy given unexpected weight loss as well as possible con commitment infection with evidence of leukocytosis, s/p bronchoscopy/biopsy Follow up blood cultures Follow up sputum cultures Continue Zosyn for antibiotic coverage Pulmonology consult appreciated, Oncology consult appreciated 
  
Anemiapatient presents with above-mentioned symptomatology and was being worked up for anemia as an outpatient with concerns for possible GI bleeding, patient was scheduled to have an outpatient endoscopy Maintain active type and screen Continue to trend hemoglobin and hematocrit Hematology consult appreciated Pt to go for UGI endoscopy on monday Gastroenterology consult appreciated 
  
Hypokalemiareplete K and recheck K 
  
Oral thrushof note patient has evidence of oral thrush upon physical examination 
continue nystatin 4 times daily Continue to monitor Urinary Tract infection - UA consistent with UTI, pt does not meet SIRS/sepsis criteria at this time Follow up blood cultures Follow up urine cultures Continue Zosyn for abx coverage Continue to monitor 
  
ProphylaxisSCDs, currently holding off on Lovenox given concern for possible GI bleed FENcardiac diet, cardiac diet, replete potassium and magnesium Full code, Disposition - pending clinical improvement, Pulmonology/Oncology/Gastroenterology evaluation/clearance________________________________________________________________________ Care Plan discussed with: 
  Comments Patient X Family  X   
RN X   
Care Manager x Consultant  x X Multidiciplinary team rounds were held today with , nursing, pharmacist and clinical coordinator.   Patient's plan of care was discussed; medications were reviewed and discharge planning was addressed. ________________________________________________________________________ Total NON critical care TIME:  35  Minutes Comments >50% of visit spent in counseling and coordination of care X   
________________________________________________________________________ Mikayla Cooney MD

## 2021-04-25 NOTE — ROUTINE PROCESS
Verbal shift change report given to 42 [address] De Médices  (oncoming nurse) by Chris Khan. (offgoing nurse). Report included the following information SBAR, MAR and Recent Results , and upcoming procedures.

## 2021-04-25 NOTE — PROGRESS NOTES
Hospitalist Progress Note NAME: Radha Sibley :  1945 MRN:  [id number] Subjective: Chief Complaint / Reason for Physician Visit Patient seen and evaluated at bedside, no acute events overnight, patient currently has no new complaints. Discussed with RN events overnight. Review of Systems: 
Symptom Y/N Comments  Symptom Y/N Comments Fever/Chills N   Chest Pain N Poor Appetite Y   Edema N   
Cough N   Abdominal Pain N Sputum N   Joint Pain N   
SOB/YOUNG N   Pruritis/Rash Y Nausea/vomit N   Tolerating PT/OT NA Diarrhea N   Tolerating Diet Y Constipation N   Other Could NOT obtain due to:   
Patient denies any fevers nausea vomiting lightheadedness dizziness chest pain palpitations headache focal weakness loss of sensation auditory or visual symptoms abdominal stool or urinary complaints or any other associated symptoms. Objective: VITALS:  
Last 24hrs VS reviewed since prior progress note. Most recent are: 
Patient Vitals for the past 24 hrs: 
 Temp Pulse Resp BP SpO2  
21 0742 97.7 °F (36.5 °C) 86 18 133/70 95 % 21 2002 98.1 °F (36.7 °C) 95  126/65 100 % No intake or output data in the 24 hours ending 21 1002 PHYSICAL EXAM: 
General: Patient appears cachectic however comfortable not in any acute distress EENT:  EOMI. Anicteric sclerae. MMM Resp:  Decreased air entry bilaterally in bilateral lower lung zones CV:  Regular  rhythm, s1 + s2, no murmurs rubs or gallops  No edema GI:  Soft, Non distended, Non tender. +Bowel sounds Neurologic:  Alert and oriented X 3, normal speech, Psych:   Good insight. Not anxious nor agitated Skin:  No rashes. No jaundice Procedures: see electronic medical records for all procedures/Xrays and details which were not copied into this note but were reviewed prior to creation of Plan. LABS: 
I reviewed today's most current labs and imaging studies.  
Pertinent labs include: 
Recent Labs  
  04/21/21 
0308 04/20/21 
1550 WBC 16.6* 16.7* HGB 8.3* 8.9* HCT 27.9* 28.9*  
* 604* Recent Labs  
  04/21/21 
0308 04/20/21 
1550  139  
K 3.8 3.4*  
 104 CO2 29 29 GLU 83 93 BUN 15 14 CREA 0.43* 0.54* CA 10.2* 10.0 ALB 2.3* 2.6* TBILI 0.3 0.4 ALT 11* 10* INR  --  1.1 Signed: Huseyin Kramer MD 
 
CT neck soft tissue:IMPRESSION 
1.  2.3 x 1.7 x 3.6 cm enhancing mass in the posterior right parotid gland. Smaller soft tissue nodules are present in the inferior left parotid gland with 
an additional nodule suspected in the right parotid gland anterior to the 
dominant mass. Overall, in a patient with suspected lung malignancy findings are 
worrisome for metastatic intraparotid adenopathy. Correlation with PET/CT may be 
helpful as clinically appropriate. 2.  Chronic right maxillary sinusitis CT chest with contrast:IMPRESSION 1. Large mass centered at the interface between the right lower lobe and the 
mediastinum, as detailed above, highly suspicious for neoplasm with leading 
differential consideration of lung cancer. 2. The mass abuts several mediastinal structures, including the esophagus and 
the heart with direct invasion difficult to exclude on this exam. 
3. T8 compression fracture of unknown chronicity. The mass also abuts the 
vertebral body making a pathologic fracture possible. 4. Erosion of the anterior right lateral aspect of the T9 vertebral body which 
is suspicious for direct invasion by the mass. 5. Enlarged right hilar lymph node, suspicious for disease involvement. 6. Indeterminate 4 mm right middle lobe solid nodule. Reviewed most current lab test results and cultures  YES Reviewed most current radiology test results   YES Review and summation of old records today    NO Reviewed patient's current orders and MAR    YES 
PMH/SH reviewed - no change compared to H&P Assessment/plan: 
 
Lung mass/pneumoniapatient presents with above-mentioned symptomatology was found to have a significant lung mass between the right lower lobe as well as mediastinotomy concerning for malignancy given unexpected weight loss as well as possible con commitment infection with evidence of leukocytosis, s/p bronchoscopy/biopsy Follow up blood cultures Follow up sputum cultures Continue Zosyn for antibiotic coverage Pulmonology consult appreciated, Oncology consult appreciated 
  
Anemiapatient presents with above-mentioned symptomatology and was being worked up for anemia as an outpatient with concerns for possible GI bleeding, patient was scheduled to have an outpatient endoscopy Maintain active type and screen Continue to trend hemoglobin and hematocrit Hematology consult appreciated Pt to go for UGI endoscopy on monday Gastroenterology consult appreciated 
  
Hypokalemiareplete K and recheck K 
  
Oral thrushof note patient has evidence of oral thrush upon physical examination 
continue nystatin 4 times daily Continue to monitor Urinary Tract infection - UA consistent with UTI, pt does not meet SIRS/sepsis criteria at this time Follow up blood cultures Follow up urine cultures Continue Zosyn for abx coverage Continue to monitor 
  
ProphylaxisSCDs, currently holding off on Lovenox given concern for possible GI bleed FENcardiac diet, cardiac diet, replete potassium and magnesium Full code, Disposition - pending clinical improvement, Pulmonology/Oncology/Gastroenterology evaluation/clearance________________________________________________________________________ Care Plan discussed with: 
  Comments Patient X Family  X   
RN X   
Care Manager x Consultant  x X Multidiciplinary team rounds were held today with , nursing, pharmacist and clinical coordinator. Patient's plan of care was discussed; medications were reviewed and discharge planning was addressed. ________________________________________________________________________ Total NON critical care TIME:  35  Minutes Comments >50% of visit spent in counseling and coordination of care X   
________________________________________________________________________ Ney Patience, MD

## 2021-04-25 NOTE — PROGRESS NOTES
Problem: Falls - Risk of 
Goal: *Absence of Falls Description: Document Remigio Galarza Fall Risk and appropriate interventions in the flowsheet. Outcome: Progressing Towards Goal 
Note: Fall Risk Interventions: 
Mobility Interventions: Bed/chair exit alarm Mentation Interventions: Bed/chair exit alarm, Adequate sleep, hydration, pain control, Toileting rounds, Reorient patient, Update white board Medication Interventions: Bed/chair exit alarm Elimination Interventions: Call light in reach, Bed/chair exit alarm, Patient to call for help with toileting needs, Toileting schedule/hourly rounds History of Falls Interventions: Bed/chair exit alarm

## 2021-04-26 NOTE — PROGRESS NOTES
Hematology and Oncology Progress Note Patient: Blair Solorio MRN: [id number]  SSN: [id number] YOB: 1945  Age: 76 y.o. Sex: female Admit Date: 4/20/2021 LOS: 5 days Chief Complaint: Patient has dyspnea Subjective:  
Patient continues to have shoulder pain. Her appetite is decreased. Objective:  
 
Vitals:  
 04/25/21 6556 04/25/21 0745 04/25/21 1652 04/25/21 2055 BP: 133/70 133/70 (!) 144/80 (!) 149/77 Pulse: 86 86 85 72 Resp: 18 18 19 18 Temp: 97.7 °F (36.5 °C) 97.7 °F (36.5 °C) 97.8 °F (36.6 °C) 97.3 °F (36.3 °C) SpO2: 95%  94% 97% Weight:      
Height:      
  
Physical Exam: 
Constitutional: Elderly white female looks chronically ill. Not in any acute distress or pain. Eyes: Sclerae anicteric. Conjunctivae shows pallor. ENMT: Has mask on. Patient has bitemporal wasting. She also has slight puffiness around her eyes. Neck: No adenopathy. Respiratory: Lungs are clear bilaterally. Cardiovascular: Normal sinus rhythm. Extremities: She does have bilateral lower extremity swelling. Skin: No petechiae; no skin rash. Neurologic: Alert/oriented x 3. Lab/Data Review: 
 
Recent Results (from the past 24 hour(s)) CBC W/O DIFF Collection Time: 04/25/21 10:30 AM  
Result Value Ref Range WBC 13.4 (H) 3.6 - 11.0 K/uL  
 RBC 3.07 (L) 3.80 - 5.20 M/uL HGB 7.6 (L) 11.5 - 16.0 g/dL HCT 25.1 (L) 35.0 - 47.0 % MCV 81.8 80.0 - 99.0 FL  
 MCH 24.8 (L) 26.0 - 34.0 PG  
 MCHC 30.3 30.0 - 36.5 g/dL  
 RDW 17.9 (H) 11.5 - 14.5 % PLATELET 836 879 - 708 K/uL MPV 9.2 8.9 - 64.1 FL  
METABOLIC PANEL, BASIC Collection Time: 04/25/21 10:30 AM  
Result Value Ref Range Sodium 138 136 - 145 mmol/L Potassium 3.0 (L) 3.5 - 5.1 mmol/L Chloride 105 97 - 108 mmol/L  
 CO2 29 21 - 32 mmol/L Anion gap 4 (L) 5 - 15 mmol/L Glucose 95 65 - 100 mg/dL BUN 11 6 - 20 mg/dL Creatinine 0.48 (L) 0.55 - 1.02 mg/dL  BUN/Creatinine ratio 23 (H) 12 - 20 GFR est AA >60 >60 ml/min/1.73m2 GFR est non-AA >60 >60 ml/min/1.73m2 Calcium 10.7 (H) 8.5 - 10.1 mg/dL MAGNESIUM Collection Time: 04/25/21 10:30 AM  
Result Value Ref Range Magnesium 2.2 1.6 - 2.4 mg/dL Assessment and plan:  
 
77-year-old white female who has shortness of breath dysphagia and weight loss. Patient had CT of chest which shows right hilar mass with hilar adenopathy and possible T8 vertebral erosion. Obviously this is malignancy unless proven otherwise.  
-Patient's bronchoscopy specimen was nondiagnostic so patient was supposed to go for EGD and EUS directed biopsy by Dr. Jeffrey Knox but because of no anesthesia coverage available patient's procedure has been delayed to Monday. Discussed with Dr. Jeffrey Knox. 
-Patient has lost lots of weight lately which is a bad prognostic sign for her possible lung cancer. 
-Discussed with patient about Marinol which I will start on outpatient basis. -Based on surgical pathology will make further recommendations. 
-Patient has leukocytosis and thrombocytosis both are reactive possibly from malignancy. She had hematologic work-up and it was negative for any myeloproliferative disorders. 
-Discussed with patient and her daughter. This dictation was done by dragon, computer voice recognition software. Often unanticipated grammatical, syntax, phones and other interpretive errors are inadvertently transcribed. Please excuse errors that have escaped final proofreading. Signed By: Donna Baumann MD   
 April 25, 2021

## 2021-04-26 NOTE — PROGRESS NOTES
Hospitalist Progress Note NAME: Zenaida Castellanos :  1945 MRN:  [id number] Subjective: Chief Complaint / Reason for Physician Visit Patient seen and evaluated at bedside, no acute events overnight, patient currently has no new complaints. Discussed with RN events overnight. Review of Systems: 
Symptom Y/N Comments  Symptom Y/N Comments Fever/Chills N   Chest Pain N Poor Appetite Y   Edema N   
Cough N   Abdominal Pain N Sputum N   Joint Pain N   
SOB/YOUNG N   Pruritis/Rash Y Nausea/vomit N   Tolerating PT/OT NA Diarrhea N   Tolerating Diet Y Constipation N   Other Could NOT obtain due to:   
Patient denies any fevers nausea vomiting lightheadedness dizziness chest pain palpitations headache focal weakness loss of sensation auditory or visual symptoms abdominal stool or urinary complaints or any other associated symptoms. Objective: VITALS:  
Last 24hrs VS reviewed since prior progress note. Most recent are: 
Patient Vitals for the past 24 hrs: 
 Temp Pulse Resp BP SpO2  
21 0855     99 % 215 97.3 °F (36.3 °C) 72 18 (!) 149/77 97 % 21 1652 97.8 °F (36.6 °C) 85 19 (!) 144/80 94 % Intake/Output Summary (Last 24 hours) at 2021 1411 Last data filed at 2021 9929 Gross per 24 hour Intake  Output 700 ml Net -700 ml PHYSICAL EXAM: 
General: Patient appears cachectic however comfortable not in any acute distress EENT:  EOMI. Anicteric sclerae. MMM Resp:  Decreased air entry bilaterally in bilateral lower lung zones CV:  Regular  rhythm, s1 + s2, no murmurs rubs or gallops  No edema GI:  Soft, Non distended, Non tender. +Bowel sounds Neurologic:  Alert and oriented X 3, normal speech, Psych:   Good insight. Not anxious nor agitated Skin:  No rashes. No jaundice Procedures: see electronic medical records for all procedures/Xrays and details which were not copied into this note but were reviewed prior to creation of Plan. LABS: 
I reviewed today's most current labs and imaging studies. Pertinent labs include: 
Recent Labs  
  04/21/21 
0308 04/20/21 
1550 WBC 16.6* 16.7* HGB 8.3* 8.9* HCT 27.9* 28.9*  
* 604* Recent Labs  
  04/21/21 
0308 04/20/21 
1550  139  
K 3.8 3.4*  
 104 CO2 29 29 GLU 83 93 BUN 15 14 CREA 0.43* 0.54* CA 10.2* 10.0 ALB 2.3* 2.6* TBILI 0.3 0.4 ALT 11* 10* INR  --  1.1 Signed: Mehul Dwyer MD 
 
CT neck soft tissue:IMPRESSION 
1.  2.3 x 1.7 x 3.6 cm enhancing mass in the posterior right parotid gland. Smaller soft tissue nodules are present in the inferior left parotid gland with 
an additional nodule suspected in the right parotid gland anterior to the 
dominant mass. Overall, in a patient with suspected lung malignancy findings are 
worrisome for metastatic intraparotid adenopathy. Correlation with PET/CT may be 
helpful as clinically appropriate. 2.  Chronic right maxillary sinusitis CT chest with contrast:IMPRESSION 1. Large mass centered at the interface between the right lower lobe and the 
mediastinum, as detailed above, highly suspicious for neoplasm with leading 
differential consideration of lung cancer. 2. The mass abuts several mediastinal structures, including the esophagus and 
the heart with direct invasion difficult to exclude on this exam. 
3. T8 compression fracture of unknown chronicity. The mass also abuts the 
vertebral body making a pathologic fracture possible. 4. Erosion of the anterior right lateral aspect of the T9 vertebral body which 
is suspicious for direct invasion by the mass. 5. Enlarged right hilar lymph node, suspicious for disease involvement. 6. Indeterminate 4 mm right middle lobe solid nodule. Reviewed most current lab test results and cultures  YES Reviewed most current radiology test results   YES Review and summation of old records today    NO Reviewed patient's current orders and MAR    YES 
PMH/SH reviewed - no change compared to H&P Assessment/plan: 
 
Lung mass/pneumoniapatient presents with above-mentioned symptomatology was found to have a significant lung mass between the right lower lobe as well as mediastinotomy concerning for malignancy given unexpected weight loss as well as possible con commitment infection with evidence of leukocytosis, s/p bronchoscopy/biopsy Follow up blood cultures Follow up sputum cultures Continue Zosyn for antibiotic coverage Pulmonology consult appreciated, Oncology consult appreciated 
  
Anemiapatient presents with above-mentioned symptomatology and was being worked up for anemia as an outpatient with concerns for possible GI bleeding, patient was scheduled to have an outpatient endoscopy Maintain active type and screen Continue to trend hemoglobin and hematocrit Hematology consult appreciated Pt to go for UGI endoscopy tomorrow once potassium has improved Gastroenterology consult appreciated 
  
Hypokalemiaaggressively replete K and recheck K 
  
Oral thrushof note patient has evidence of oral thrush upon physical examination 
continue nystatin 4 times daily Continue to monitor Urinary Tract infection - UA consistent with UTI, pt does not meet SIRS/sepsis criteria at this time Follow up blood cultures Follow up urine cultures Continue Zosyn for abx coverage Continue to monitor 
  
ProphylaxisSCDs, currently holding off on Lovenox given concern for possible GI bleed FENcardiac diet, cardiac diet, replete potassium and magnesium Full code, Disposition - pending clinical improvement, Pulmonology/Oncology/Gastroenterology evaluation/clearance________________________________________________________________________ Care Plan discussed with: 
  Comments Patient X Family  X   
RN X   
Care Manager x Consultant  x                  X Multidiciplinary team rounds were held today with case manager, nursing, pharmacist and clinical coordinator. Patient's plan of care was discussed; medications were reviewed and discharge planning was addressed. ________________________________________________________________________ Total NON critical care TIME:  35  Minutes Comments >50% of visit spent in counseling and coordination of care X   
________________________________________________________________________ Jamarcus Douglas MD

## 2021-04-26 NOTE — PROGRESS NOTES
Pulmonary Progress Note NAME: Vishal Casey :  1945 MRM:  [id number] Date/Time: 2021  5:38 PM 
 
 
  
Subjective:  
 
Patient evaluated in her room. Daughter was also present at bedside. Patient is weak, complains of pain in her back. Getting potassium replaced. She is awake and alert. Procedure for EGD and EUS needle aspiration biopsy of mediastinal mass was postponed till tomorrow because of hypokalemia . Denies any hemoptysis Remains on room air. Past Medical History reviewed and unchanged from Admission History and Physical 
 
  
Objective:  
 
Physical Exam  
 
Vitals:  
  
Last 24hrs VS reviewed since prior progress note. Most recent are: 
 
Visit Vitals BP (!) 149/77 Pulse 72 Temp 97.3 °F (36.3 °C) Resp 18 Ht 5' 8\" (1.727 m) Wt 37.2 kg (81 lb 15.8 oz) SpO2 99% Breastfeeding No  
BMI 12.47 kg/m² SpO2 Readings from Last 6 Encounters:  
21 99% 21 96% 21 97% Intake/Output Summary (Last 24 hours) at 2021 1357 Last data filed at 2021 4205 Gross per 24 hour Intake  Output 700 ml Net -700 ml GENERAL:  The patient is an elderly, frail  female who does not appear to be in any distress. Saturation is 97% on room air. HEENT:  Showed pupils are equal and reactive to light. Pallor is noted. No icterus. Nasal passages are patent. Oropharynx is without obvious thrush. NECK:  Supple. Trachea is central.  No JVD or lymphadenopathy. The patient stated that she had a lump on the right side, which has grown away. She is not using any accessory muscles of respiration. However, she has had significant weight loss, with prominent supraclavicular fossa. CHEST:  Symmetrical with equal and fair air entry bilaterally. Breath sounds are diminished in general.    No wheezing or rhonchi today. No chest wall tenderness. HEART:  Rhythm is regular without any loud murmur or gallop.  
ABDOMEN:  Soft and benign. Bowel sounds are audible. No masses or organomegaly. EXTREMITIES:  Did not show any cyanosis or clubbing. No pitting edema. Pulses are palpable. NEUROLOGIC:  Grossly intact. SKIN:  Warm and dry. XR CHEST PORT Final Result XR FLUOROSCOPY UNDER 60 MINUTES Final Result CT NECK SOFT TISSUE W CONT Final Result 1.  2.3 x 1.7 x 3.6 cm enhancing mass in the posterior right parotid gland. Smaller soft tissue nodules are present in the inferior left parotid gland with  
an additional nodule suspected in the right parotid gland anterior to the  
dominant mass. Overall, in a patient with suspected lung malignancy findings are  
worrisome for metastatic intraparotid adenopathy. Correlation with PET/CT may be  
helpful as clinically appropriate. 2.  Chronic right maxillary sinusitis. CT CHEST W CONT Final Result 1. Large mass centered at the interface between the right lower lobe and the  
mediastinum, as detailed above, highly suspicious for neoplasm with leading  
differential consideration of lung cancer. 2. The mass abuts several mediastinal structures, including the esophagus and  
the heart with direct invasion difficult to exclude on this exam.  
3. T8 compression fracture of unknown chronicity. The mass also abuts the  
vertebral body making a pathologic fracture possible. 4. Erosion of the anterior right lateral aspect of the T9 vertebral body which  
is suspicious for direct invasion by the mass. 5. Enlarged right hilar lymph node, suspicious for disease involvement. 6. Indeterminate 4 mm right middle lobe solid nodule. XR CHEST PORT Final Result 1. Right hilar fullness and increased density with suspicion for potential right  
hilar mass. Recommend further evaluation with contrast enhanced chest CT. 2. See above for additional comments. The above was discussed with and acknowledged by Dr. Noemi Bella by telephone  
on  4/20/2021 4:09 PM. Lab Data Reviewed: (see below) Medications: 
Current Facility-Administered Medications Medication Dose Route Frequency  potassium chloride 10 mEq in 100 ml IVPB  10 mEq IntraVENous Q1H  
 HYDROmorphone (DILAUDID) syringe 0.5 mg  0.5 mg IntraVENous Q3H PRN  
 0.9% sodium chloride infusion  50 mL/hr IntraVENous CONTINUOUS  
 EPINEPHrine (ADRENALIN) 0.1 mg/mL syringe    PRN  
 lidocaine (XYLOCAINE) 10 mg/mL (1 %) injection    PRN  mineral oil (topical)    PRN  
 albuterol-ipratropium (DUO-NEB) 2.5 MG-0.5 MG/3 ML  3 mL Nebulization Q4H PRN  
 melatonin tablet 6 mg  6 mg Oral QHS  nystatin (MYCOSTATIN) 100,000 unit/mL oral suspension 500,000 Units  500,000 Units Oral QID  multivitamin, tx-iron-ca-min (THERA-M w/ IRON) tablet 1 Tab  1 Tab Oral DAILY  lidocaine (XYLOCAINE) 4 % cream   Topical BID  cyclobenzaprine (FLEXERIL) tablet 10 mg  10 mg Oral TID  sodium chloride (NS) flush 5-40 mL  5-40 mL IntraVENous Q8H  
 sodium chloride (NS) flush 5-40 mL  5-40 mL IntraVENous PRN  
 acetaminophen (TYLENOL) tablet 650 mg  650 mg Oral Q6H PRN Or  
 acetaminophen (TYLENOL) suppository 650 mg  650 mg Rectal Q6H PRN  polyethylene glycol (MIRALAX) packet 17 g  17 g Oral DAILY PRN  promethazine (PHENERGAN) tablet 12.5 mg  12.5 mg Oral Q6H PRN Or  
 ondansetron (ZOFRAN) injection 4 mg  4 mg IntraVENous Q6H PRN  piperacillin-tazobactam (ZOSYN) 3.375 g in 0.9% sodium chloride (MBP/ADV) 100 mL MBP  3.375 g IntraVENous Q8H  
 
 
______________________________________________________________________ Lab Review:  
 
Recent Labs  
  04/26/21 
0659 04/25/21 
1030 04/23/21 
1920 WBC 16.0* 13.4* 20.6* HGB 8.4* 7.6* 8.9* HCT 27.7* 25.1* 29.5* * 350 606* Recent Labs  
  04/26/21 
1255 04/26/21 
0659 04/25/21 
1030 04/23/21 
1920 NA  --  141 138 140  
K 2.7* 2.6* 3.0* 3.8 CL  --  104 105 105 CO2  --  31 29 31 GLU  --  88 95 106* BUN  --  11 11 14 CREA  -- 0.48* 0.48* 0.63 CA  --  11.1* 10.7* 11.1*  
MG  --  2.1 2.2  --   
ALB  --   --   --  2.5* ALT  --   --   --  12 No components found for: [address] [facility] No results for input(s): PH, PCO2, PO2, HCO3, FIO2 in the last 72 hours. No results for input(s): INR, INREXT, INREXT, INREXT in the last 72 hours. Other pertinent lab:  
CT of the chest done on 04/20/2021 was personally reviewed. She has a large right lower lobe medially located heterogeneous mass, which measures 8.1 cm in maximum dimension. There is also T8 compression fracture. Increased right hilar lymphadenopathy is described. Influenza A and B is negative. COVID-19 rapid test is negative. Assessment & Plan: 1. A 17-year-old  female with a history of extensive and ongoing tobacco use presenting with weight loss and back pain. She has a large mass in the right lower lobe located medially adjacent to the mediastinum and probably involving the mediastinum. It is measuring 8.1 cm in size. It is heterogeneous. There is right hilar lymphadenopathy. CT images were personally reviewed with the radiologist.  Most likely, it appears to be lung cancer. There also appears to be T8 compression fracture causing significant back pain. The patient underwent bronchoscopy on 4/22. There was extrinsic compression noted in the distal bronchus intermedius. However I was able to pass the bronchoscope in the right middle lobe and the right lower lobe segments. Biopsies were taken from this area. 2 transbronchial biopsies were also taken. Cytology brushings and washings were obtained. IPreliminary report shows no evidence of malignancy. Patient was scheduled for EGD and EUS with biopsies today was postponed till tomorrow because of severe hypokalemia with potassium 2.7. Delsa Severance discussed with the daughter in detail. 2.  Chronic obstructive pulmonary disease. There are significant emphysematous changes on the CT scan.   Continue with nebulized bronchodilator treatments at this time. She is comfortable on room air. 3.  Back pain. This is due to compression fracture which may be pathological fractures. Advocate judicious pain control. Hematology/Oncology consulted. 4.  Anemia. 5.  For deep vein thrombosis prophylaxis, on sequential compression devices to the lower extremities. Patient has elevated WBC count and has been on IV Zosyn. Thank you for allowing me to participate in the care of this patient. More than 30 minutes spent with patient care.  
 
Sofia Sandra MD

## 2021-04-26 NOTE — PROGRESS NOTES
Hematology and Oncology Progress Note Patient: Blair Solorio MRN: [id number]  SSN: [id number] YOB: 1945  Age: 76 y.o. Sex: female Admit Date: 4/20/2021 LOS: 6 days Chief Complaint: Patient has dyspnea Subjective:  
Patient continues to have shoulder pain. Her appetite is decreased. Objective:  
 
Vitals:  
 04/25/21 0745 04/25/21 1652 04/25/21 2055 04/26/21 2078 BP: 133/70 (!) 144/80 (!) 149/77 Pulse: 86 85 72 Resp: 18 19 18 Temp: 97.7 °F (36.5 °C) 97.8 °F (36.6 °C) 97.3 °F (36.3 °C) SpO2:  94% 97% 99% Weight:      
Height:      
  
Physical Exam: 
Constitutional: Elderly white female looks chronically ill. Not in any acute distress or pain. Eyes: Sclerae anicteric. Conjunctivae shows pallor. ENMT: Has mask on. Patient has bitemporal wasting. She also has slight puffiness around her eyes. Neck: No adenopathy. Respiratory: Lungs are clear bilaterally. Cardiovascular: Normal sinus rhythm. Extremities: She does have bilateral lower extremity swelling. Skin: No petechiae; no skin rash. Neurologic: Alert/oriented x 3. Lab/Data Review: 
 
Recent Results (from the past 24 hour(s)) CBC W/O DIFF Collection Time: 04/26/21  6:59 AM  
Result Value Ref Range WBC 16.0 (H) 3.6 - 11.0 K/uL  
 RBC 3.37 (L) 3.80 - 5.20 M/uL HGB 8.4 (L) 11.5 - 16.0 g/dL HCT 27.7 (L) 35.0 - 47.0 % MCV 82.2 80.0 - 99.0 FL  
 MCH 24.9 (L) 26.0 - 34.0 PG  
 MCHC 30.3 30.0 - 36.5 g/dL  
 RDW 17.8 (H) 11.5 - 14.5 % PLATELET 959 (H) 585 - 400 K/uL MPV 8.6 (L) 8.9 - 20.8 FL  
METABOLIC PANEL, BASIC Collection Time: 04/26/21  6:59 AM  
Result Value Ref Range Sodium 141 136 - 145 mmol/L Potassium 2.6 (LL) 3.5 - 5.1 mmol/L Chloride 104 97 - 108 mmol/L  
 CO2 31 21 - 32 mmol/L Anion gap 6 5 - 15 mmol/L Glucose 88 65 - 100 mg/dL BUN 11 6 - 20 mg/dL Creatinine 0.48 (L) 0.55 - 1.02 mg/dL BUN/Creatinine ratio 23 (H) 12 - 20  GFR est AA >60 >60 ml/min/1.73m2 GFR est non-AA >60 >60 ml/min/1.73m2 Calcium 11.1 (H) 8.5 - 10.1 mg/dL MAGNESIUM Collection Time: 04/26/21  6:59 AM  
Result Value Ref Range Magnesium 2.1 1.6 - 2.4 mg/dL POTASSIUM Collection Time: 04/26/21 12:55 PM  
Result Value Ref Range Potassium 2.7 (LL) 3.5 - 5.1 mmol/L Assessment and plan:  
 
79-year-old white female who has shortness of breath dysphagia and weight loss. Patient had CT of chest which shows right hilar mass with hilar adenopathy and possible T8 vertebral erosion. Obviously this is malignancy unless proven otherwise.  
-Patient's bronchoscopy specimen was nondiagnostic so patient was supposed to go for EGD and EUS directed biopsy by Dr. Eagle Miranda but postponed for tomorrow.  
-Patient has lost lots of weight lately which is a bad prognostic sign for her possible lung cancer. 
-Based on surgical pathology will make further recommendations. 
-Patient has leukocytosis and thrombocytosis both are reactive possibly from malignancy. She had hematologic work-up and it was negative for any myeloproliferative disorders. Anemia:monitor. Leukocytosis:White count going up. Thrombocytosis:possible reactive. Signed By: Fabby Morris MD   
 April 26, 2021

## 2021-04-26 NOTE — PROGRESS NOTES
Progress Note Patient: Crow Tristan MRN: [id number]  SSN: [id number] YOB: 1945  Age: 76 y.o. Sex: female Admit Date: 4/20/2021 LOS: 6 days Subjective:  
 
Patient seen, awake and alert. No new complaints. Generally weak. GI is following for anemia consult. Patient was admitted to the hospital with complaints of shortness of breath, generalized weakness, and lost of weight since December. CT of chest and neck showed a large mass in the right lower lower lobe and mass in the right parotid gland, concerns for malignancy. EGD/EUS  today was postponed due to low potassium level. Will try again tomorrow. Patient has been getting potassium replacements, oral and IV. Potassium level 2.7. Objective:  
 
Vitals:  
 04/25/21 0745 04/25/21 1652 04/25/21 2055 04/26/21 7763 BP: 133/70 (!) 144/80 (!) 149/77 Pulse: 86 85 72 Resp: 18 19 18 Temp: 97.7 °F (36.5 °C) 97.8 °F (36.6 °C) 97.3 °F (36.3 °C) SpO2:  94% 97% 99% Weight:      
Height:      
  
 
Intake and Output: 
Current Shift: 04/26 0701 - 04/26 1900 In: -  
Out: 700 [Urine:700] Last three shifts: No intake/output data recorded. Physical Exam:  
Skin:  Extremities and face reveal no rashes. No callaway erythema. HEENT: Sclerae anicteric. Extra-occular muscles are intact. No abnormal pigmentation of the lips. The neck is supple. Cardiovascular: Regular rate and rhythm. Respiratory:  Comfortable breathing with no accessory muscle use. GI:  Abdomen nondistended, soft, and nontender. No enlargement of the liver or spleen. No masses palpable. Rectal:  Deferred Musculoskeletal: Extremities have good range of motion. Neurological:  Gross memory appears intact. Patient is alert and oriented. Psychiatric:  Mood appears appropriate with judgement intact. Lymphatic:  No visible adenopathy Lab/Data Review: 
Recent Results (from the past 24 hour(s)) CBC W/O DIFF  Collection Time: 04/26/21  6:59 AM Result Value Ref Range WBC 16.0 (H) 3.6 - 11.0 K/uL  
 RBC 3.37 (L) 3.80 - 5.20 M/uL HGB 8.4 (L) 11.5 - 16.0 g/dL HCT 27.7 (L) 35.0 - 47.0 % MCV 82.2 80.0 - 99.0 FL  
 MCH 24.9 (L) 26.0 - 34.0 PG  
 MCHC 30.3 30.0 - 36.5 g/dL  
 RDW 17.8 (H) 11.5 - 14.5 % PLATELET 965 (H) 471 - 400 K/uL MPV 8.6 (L) 8.9 - 32.8 FL  
METABOLIC PANEL, BASIC Collection Time: 04/26/21  6:59 AM  
Result Value Ref Range Sodium 141 136 - 145 mmol/L Potassium 2.6 (LL) 3.5 - 5.1 mmol/L Chloride 104 97 - 108 mmol/L  
 CO2 31 21 - 32 mmol/L Anion gap 6 5 - 15 mmol/L Glucose 88 65 - 100 mg/dL BUN 11 6 - 20 mg/dL Creatinine 0.48 (L) 0.55 - 1.02 mg/dL BUN/Creatinine ratio 23 (H) 12 - 20 GFR est AA >60 >60 ml/min/1.73m2 GFR est non-AA >60 >60 ml/min/1.73m2 Calcium 11.1 (H) 8.5 - 10.1 mg/dL MAGNESIUM Collection Time: 04/26/21  6:59 AM  
Result Value Ref Range Magnesium 2.1 1.6 - 2.4 mg/dL POTASSIUM Collection Time: 04/26/21 12:55 PM  
Result Value Ref Range Potassium 2.7 (LL) 3.5 - 5.1 mmol/L  
  
 
 
 
 
XR ABD (AP AND ERECT OR DECUB) Final Result Findings/impression:  
  
Nonspecific gaseous distention of the stomach. No definite pneumoperitoneum. Moderate stool in the rectum. Right perihilar mass lesion partially visualized. See recently performed chest  
CT. No acute osseous abnormality identified. XR CHEST PORT Final Result XR FLUOROSCOPY UNDER 60 MINUTES Final Result CT NECK SOFT TISSUE W CONT Final Result 1.  2.3 x 1.7 x 3.6 cm enhancing mass in the posterior right parotid gland. Smaller soft tissue nodules are present in the inferior left parotid gland with  
an additional nodule suspected in the right parotid gland anterior to the  
dominant mass. Overall, in a patient with suspected lung malignancy findings are  
worrisome for metastatic intraparotid adenopathy.  Correlation with PET/CT may be  
helpful as clinically appropriate. 2.  Chronic right maxillary sinusitis. CT CHEST W CONT Final Result 1. Large mass centered at the interface between the right lower lobe and the  
mediastinum, as detailed above, highly suspicious for neoplasm with leading  
differential consideration of lung cancer. 2. The mass abuts several mediastinal structures, including the esophagus and  
the heart with direct invasion difficult to exclude on this exam.  
3. T8 compression fracture of unknown chronicity. The mass also abuts the  
vertebral body making a pathologic fracture possible. 4. Erosion of the anterior right lateral aspect of the T9 vertebral body which  
is suspicious for direct invasion by the mass. 5. Enlarged right hilar lymph node, suspicious for disease involvement. 6. Indeterminate 4 mm right middle lobe solid nodule. XR CHEST PORT Final Result 1. Right hilar fullness and increased density with suspicion for potential right  
hilar mass. Recommend further evaluation with contrast enhanced chest CT. 2. See above for additional comments. The above was discussed with and acknowledged by Dr. Wallace Michaud by telephone  
on  4/20/2021 4:09 PM. Assessment:  
 
Active Problems: 
  Lung mass (4/20/2021) Pneumonia (4/20/2021) Plan:  
Re-schedule EUS with fine needle aspiration biopsy tomorrow and PEG tube placement. Keep NPO post midnight. Repeat labs in am.  
 
Patient discussed with Dr Dami Lopez and agrees to above impression and plan. Thank you for allowing me to participate in this patients care Signed By: Darrius Carmichael NP April 26, 2021

## 2021-04-26 NOTE — HOSPICE
[facility] 
[phone] Patient Name: Elizabeth Valles YOB: 1945 Age: 76 y.o. [address] [facility] RN Note:  Met with patient at bedside for follow up discussion for home hospice. Patient reports she just wants to go home and has not decided on either hospice or home health and reports she does not feel like she is \"sick\". Will continue to follow. Spoke with nurse caring for patient. She will reach out to daughter (who works here) and provide contact information for this liaison. Family in need of info session. Patient is anxious and unable to discuss hospice at this time. Thank you for the opportunity to be of service to Ms. Lakeisha Musa and her family.

## 2021-04-27 NOTE — HOSPICE
[facility]  [phone]     Patient Name: Adonay Gordon  YOB: 1945  Age: 76 y.o. [facility] RN Note:  Met with daughter while patient having CT following EGD/PEG placement earlier today. Daughter tearful, distraught and unable to comprehend extent of patient illness. She is unsure about hospice support at this time. Provided information regarding GIP should patient continue to decline versus home hospice. Will continue to follow and offer support.     Wong Archer RN  Hospice Clinical Liaison  [phone]

## 2021-04-27 NOTE — ANESTHESIA PREPROCEDURE EVALUATION
Relevant Problems RESPIRATORY SYSTEM  
(+) Pneumonia Anesthetic History No history of anesthetic complications Review of Systems / Medical History Patient summary reviewed, nursing notes reviewed and pertinent labs reviewed Pulmonary Within defined limits Comments: HISTORY: Cough, weight loss 
  
FINDINGS: 
  
Centered at the junction of the medial aspect of the right lower lobe and the 
posterior mediastinum/pleural cavity there is a heterogeneous mass measuring 8.1 
x 5.0 x 6.8 cm that is likely centrally necrotic with central low density. There 
are surrounding areas of groundglass opacification and probable spiculated 
margins within the right lower lobe. The esophagus abuts the mass and appears 
deviated anteriorly and laterally to the left. Direct invasion of the esophagus 
is difficult to exclude. Portions of the mass also directly abut the posterior 
aspect of the heart. 
  
Enlarged right hilar lymph node measuring approximately 15 mm that is suspicious 
for disease involvement. 
  
Heart is upper limits of normal for size. No pericardial effusion. Visualized 
portions of the thyroid appear unremarkable. Atherosclerosis of the thoracic 
aorta which is mildly ectatic. 
  
Severe emphysematous changes within both lungs. Scarring within the lung apices. No pneumothorax or significant pleural effusion is evident. 4 mm incidental 
solid nodule in the right middle lobe (axial series 204, image 73), 
indeterminate. 
  
There is a heterogeneously enhancing region within the right hepatic lobe which 
is otherwise poorly evaluated on this exam. The left adrenal gland appears 
thickened. 
  
Compression deformity of T8 of unknown chronicity. Significant vertebral body 
height loss of the vertebra which is greater than 50% of the vertebral body 
height.  There is mild posterior buckling of the posterior vertebral body cortex 
without significant spinal canal narrowing evident by CT. Please note that this 
vertebra abuts the above-described mass and a pathologic fracture is therefore 
difficult to exclude. The anterior and right lateral aspect of the T9 vertebral 
body appears mildly eroded, suspicious for direct invasion of the mass. Previously described irregularity of right lower ribs on the prior chest 
radiograph appears most likely to be related to superimposition artifact. No 
suspicious superficial soft tissue abnormalities. 
  
IMPRESSION 1. Large mass centered at the interface between the right lower lobe and the 
mediastinum, as detailed above, highly suspicious for neoplasm with leading 
differential consideration of lung cancer. 2. The mass abuts several mediastinal structures, including the esophagus and 
the heart with direct invasion difficult to exclude on this exam. 
3. T8 compression fracture of unknown chronicity. The mass also abuts the 
vertebral body making a pathologic fracture possible. 4. Erosion of the anterior right lateral aspect of the T9 vertebral body which 
is suspicious for direct invasion by the mass. 5. Enlarged right hilar lymph node, suspicious for disease involvement. 6. Indeterminate 4 mm right middle lobe solid nodule. Neuro/Psych Within defined limits Cardiovascular Within defined limits Comments: Diagnosis   Final 
Normal sinus rhythm Possible Left atrial enlargement Anteroseptal infarct , age undetermined Abnormal ECG No previous ECGs available Confirmed by Liz Gleason [phone]) on 4/20/2021 3:20:36 PM  
 
  
GI/Hepatic/Renal 
Within defined limits Endo/Other Cancer (Lung Cancer) and anemia Other Findings Comments:  
Procedure Information Case: [id number] Date/Time: 04/27/21 8644 Procedures: PERCUTANEOUS ENDOSCOPIC GASTROSTOMY TUBE INSERTION (N/A ) ENDOSCOPIC ULTRASOUND (EUS) (N/A ) FINE NEEDLE ASPIRATION (N/A ) Anesthesia type: General 
Diagnosis: Dysphagia, unspecified type (R13.10) Pre-op diagnosis: Dysphagia, unspecified type (R13.10) Location: [facility] ENDO 02 / [facility] ENDOSCOPY Providers: Christiana Mcgraw MD 
 
Medical History Thrombocytosis ([facility] [address] Anemia Past Medical History:  
Diagnosis Date  Anemia  Thrombocytosis ([facility] [address] Past Surgical History:  
Procedure Laterality Date  HX GI    
 hemrrhoid Current Outpatient Medications Medication Instructions  acetaminophen (TYLENOL) 650 mg, Oral, EVERY 6 HOURS AS NEEDED  
 iron aspgly,[id number] (FERREX 150 FORTE PLUS) 129-14-67-8 mg-mg-mcg-mg cap cap 1 Cap, Oral, 2 TIMES DAILY  levoFLOXacin (LEVAQUIN) 500 mg, Oral, DAILY  lidocaine HCL (XYLOCAINE) 3 % topical cream Topical, 2 TIMES DAILY  melatonin 6 mg, Oral, EVERY BEDTIME  nystatin (MYCOSTATIN) 500,000 Units, Oral, 4 TIMES DAILY, swish and spit  oxyCODONE IR (ROXICODONE) 10 mg, Oral, EVERY 6 HOURS AS NEEDED  
 oxyCODONE IR (ROXICODONE) 5 mg, Oral, EVERY 6 HOURS AS NEEDED  tiZANidine (ZANAFLEX) 2 mg tablet One orally TID for back and shoulder pain No current facility-administered medications for this visit. Current Outpatient Medications Medication Sig  
 acetaminophen (TYLENOL) 325 mg tablet Take 2 Tabs by mouth every six (6) hours as needed for Pain or Fever.  melatonin 3 mg tablet Take 2 Tabs by mouth nightly.  nystatin (MYCOSTATIN) 100,000 unit/mL suspension Take 5 mL by mouth four (4) times daily. swish and spit  levoFLOXacin (Levaquin) 500 mg tablet Take 1 Tab by mouth daily.  oxyCODONE IR (Roxicodone) 5 mg immediate release tablet Take 2 Tabs by mouth every six (6) hours as needed for Pain for up to 3 days. Max Daily Amount: 40 mg.  
 oxyCODONE IR (Roxicodone) 5 mg immediate release tablet Take 1 Tab by mouth every six (6) hours as needed (breakthrough pain) for up to 3 days. Max Daily Amount: 20 mg. Facility-Administered Medications Ordered in Other Visits Medication Dose Route Frequency  HYDROmorphone (DILAUDID) syringe 0.5 mg  0.5 mg IntraVENous Q3H PRN  
 sodium phosphates (FLEET'S) enema 66 mL  1 Enema Rectal NOW  
 0.9% sodium chloride infusion  50 mL/hr IntraVENous CONTINUOUS  
 EPINEPHrine (ADRENALIN) 0.1 mg/mL syringe    PRN  
 lidocaine (XYLOCAINE) 10 mg/mL (1 %) injection    PRN  mineral oil (topical)    PRN  
 albuterol-ipratropium (DUO-NEB) 2.5 MG-0.5 MG/3 ML  3 mL Nebulization Q4H PRN  
 melatonin tablet 6 mg  6 mg Oral QHS  nystatin (MYCOSTATIN) 100,000 unit/mL oral suspension 500,000 Units  500,000 Units Oral QID  multivitamin, tx-iron-ca-min (THERA-M w/ IRON) tablet 1 Tab  1 Tab Oral DAILY  lidocaine (XYLOCAINE) 4 % cream   Topical BID  cyclobenzaprine (FLEXERIL) tablet 10 mg  10 mg Oral TID  sodium chloride (NS) flush 5-40 mL  5-40 mL IntraVENous Q8H  
 sodium chloride (NS) flush 5-40 mL  5-40 mL IntraVENous PRN  
 acetaminophen (TYLENOL) tablet 650 mg  650 mg Oral Q6H PRN Or  
 acetaminophen (TYLENOL) suppository 650 mg  650 mg Rectal Q6H PRN  polyethylene glycol (MIRALAX) packet 17 g  17 g Oral DAILY PRN  promethazine (PHENERGAN) tablet 12.5 mg  12.5 mg Oral Q6H PRN Or  
 ondansetron (ZOFRAN) injection 4 mg  4 mg IntraVENous Q6H PRN  piperacillin-tazobactam (ZOSYN) 3.375 g in 0.9% sodium chloride (MBP/ADV) 100 mL MBP  3.375 g IntraVENous Q8H No data found. Lab Results Component Value Date/Time WBC 16.0 (H) 04/26/2021 06:59 AM  
 HGB 8.4 (L) 04/26/2021 06:59 AM  
 HCT 27.7 (L) 04/26/2021 06:59 AM  
 PLATELET 540 (H) 40/48/4537 06:59 AM  
 MCV 82.2 04/26/2021 06:59 AM  
 
Lab Results Component Value Date/Time  Sodium 141 04/26/2021 06:59 AM  
 Potassium 2.7 (LL) 04/26/2021 12:55 PM  
 Chloride 104 04/26/2021 06:59 AM  
 CO2 31 04/26/2021 06:59 AM  
 Anion gap 6 04/26/2021 06:59 AM  
 Glucose 88 04/26/2021 06:59 AM  
 BUN 11 04/26/2021 06:59 AM  
 Creatinine 0.48 (L) 04/26/2021 06:59 AM  
 BUN/Creatinine ratio 23 (H) 04/26/2021 06:59 AM  
 GFR est AA >60 04/26/2021 06:59 AM  
 GFR est non-AA >60 04/26/2021 06:59 AM  
 Calcium 11.1 (H) 04/26/2021 06:59 AM  
 
No results found for: APTT, PTP, INR, INREXT, INREXT Lab Results Component Value Date/Time Glucose 88 04/26/2021 06:59 AM  
 
Physical Exam 
 
Airway Mallampati: I 
TM Distance: 4 - 6 cm Neck ROM: normal range of motion Mouth opening: Normal 
 
 Cardiovascular Rhythm: regular Rate: normal 
 
 
 
 Dental 
No notable dental hx Pulmonary Breath sounds clear to auscultation Abdominal 
GI exam deferred Other Findings Comments: Results for Hilario Nolasco (MRN [id number]) as of 4/26/2021 20:36 
 
4/26/2021 06:59 WBC: 16.0 (H) RBC: 3.37 (L) HGB: 8.4 (L) HCT: 27.7 (L) MCV: 82.2 MCH: 24.9 (L) MCHC: 30.3 RDW: 17.8 (H) PLATELET: 326 (H) MPV: 8.6 (L) Results for Hilario Nolasco (MRN [id number]) as of 4/26/2021 20:36 
 
4/26/2021 06:59 Sodium: 141 Potassium: 2.6 (LL) Chloride: 104 CO2: 31 Anion gap: 6 Glucose: 88 BUN: 11 Creatinine: 0.48 (L) BUN/Creatinine ratio: 23 (H) Calcium: 11.1 (H) Magnesium: 2.1 GFR est non-AA: >60 
GFR est AA: >60 Anesthetic Plan ASA: 4 Anesthesia type: general 
 
 
 
 
Induction: Intravenous Anesthetic plan and risks discussed with: Patient General anesthesia was prescribed for this patient because by definition it is \"a drug-induced loss of consciousness during which patients are not arousable, even by painful stimulation. \" Sometimes, the ability to independently maintain ventilatory function is often impaired and patients often require assistance in maintaining a patent airway. Occasionally, positive pressure ventilation may be required because of depressed spontaneous ventilation or drug-induced depression of neuromuscular function.  This depth of anesthesia is preferred for endoscopic/esophageal procedures to facilitate the procedure and for patient safety/quality of care.

## 2021-04-27 NOTE — PROGRESS NOTES
Hospitalist Progress Note    NAME: Naldo Sweeney   :  1945   MRN:  [id number]       Subjective:     Chief Complaint / Reason for Physician Visit  Patient seen and evaluated at bedside,   Frail, confused, limited interaction. EGD today, showed extrinsic compression in the mid esophagus consistent with CAT scan of the chest mass. PEG tube placed and eus of mediastinal mass performed. Remains on room air,   Afebrile. Nursing tells me she's remained confused with some hallucinations. Discussed with RN events overnight. Review of Systems:  Symptom Y/N Comments  Symptom Y/N Comments   Fever/Chills N   Chest Pain N    Poor Appetite Y   Edema N    Cough N   Abdominal Pain N    Sputum N   Joint Pain N    SOB/YOUNG N   Pruritis/Rash Y    Nausea/vomit N   Tolerating PT/OT NA    Diarrhea N   Tolerating Diet Y    Constipation N   Other       Could NOT obtain due to:    Patient denies any fevers nausea vomiting lightheadedness dizziness chest pain palpitations headache focal weakness loss of sensation auditory or visual symptoms abdominal stool or urinary complaints or any other associated symptoms. Objective:     VITALS:   Last 24hrs VS reviewed since prior progress note. Most recent are:  Patient Vitals for the past 24 hrs:   Temp Pulse Resp BP SpO2   21 1115 97.2 °F (36.2 °C) 80 16 131/73 99 %   21 1110  79 15 138/69 100 %   21 1103 97.3 °F (36.3 °C) 74 16 124/67 100 %   21 0800 98.2 °F (36.8 °C) 96 18 (!) 154/59 95 %   21 2150 97.8 °F (36.6 °C) 85 18 (!) 144/80 95 %   21 2105     97 %   21 1635 97.3 °F (36.3 °C) 78 18 (!) 143/71 98 %       Intake/Output Summary (Last 24 hours) at 2021 1210  Last data filed at 2021 1056  Gross per 24 hour   Intake 1000 ml   Output 1 ml   Net 999 ml        PHYSICAL EXAM:  General: Patient appears cachectic however comfortable not in any acute distress    EENT:  EOMI. Anicteric sclerae.  MMM  Resp:  Decreased air entry bilaterally in bilateral lower lung zones  CV:  Regular  rhythm, s1 + s2, no murmurs rubs or gallops  No edema  GI:  Soft, Non distended, Non tender. +Bowel sounds  Neurologic:  Alert and oriented X 2, normal speech   Psych:   Not anxious nor agitated  Skin:  No rashes. No jaundice    Procedures: see electronic medical records for all procedures/Xrays and details which were not copied into this note but were reviewed prior to creation of Plan. LABS:  I reviewed today's most current labs and imaging studies. Pertinent labs include:  Recent Results (from the past 24 hour(s))   METABOLIC PANEL, BASIC    Collection Time: 04/26/21  9:33 PM   Result Value Ref Range    Sodium 140 136 - 145 mmol/L    Potassium 3.7 3.5 - 5.1 mmol/L    Chloride 105 97 - 108 mmol/L    CO2 28 21 - 32 mmol/L    Anion gap 7 5 - 15 mmol/L    Glucose 74 65 - 100 mg/dL    BUN 15 6 - 20 mg/dL    Creatinine 0.57 0.55 - 1.02 mg/dL    BUN/Creatinine ratio 26 (H) 12 - 20      GFR est AA >60 >60 ml/min/1.73m2    GFR est non-AA >60 >60 ml/min/1.73m2    Calcium 12.0 (H) 8.5 - 10.1 mg/dL   CBC WITH AUTOMATED DIFF    Collection Time: 04/27/21  7:37 AM   Result Value Ref Range    WBC 20.5 (H) 3.6 - 11.0 K/uL    RBC 3.52 (L) 3.80 - 5.20 M/uL    HGB 8.7 (L) 11.5 - 16.0 g/dL    HCT 29.0 (L) 35.0 - 47.0 %    MCV 82.4 80.0 - 99.0 FL    MCH 24.7 (L) 26.0 - 34.0 PG    MCHC 30.0 30.0 - 36.5 g/dL    RDW 18.3 (H) 11.5 - 14.5 %    PLATELET 474 (H) 280 - 400 K/uL    MPV 8.6 (L) 8.9 - 12.9 FL    NEUTROPHILS 94 (H) 32 - 75 %    LYMPHOCYTES 3 (L) 12 - 49 %    MONOCYTES 3 (L) 5 - 13 %    EOSINOPHILS 0 0 - 7 %    BASOPHILS 0 0 - 1 %    IMMATURE GRANULOCYTES 0 0.0 - 0.5 %    ABS. NEUTROPHILS 19.3 (H) 1.8 - 8.0 K/UL    ABS. LYMPHOCYTES 0.6 (L) 0.8 - 3.5 K/UL    ABS. MONOCYTES 0.6 0.0 - 1.0 K/UL    ABS. EOSINOPHILS 0.0 0.0 - 0.4 K/UL    ABS. BASOPHILS 0.0 0.0 - 0.1 K/UL    ABS. IMM.  GRANS. 0.1 (H) 0.00 - 0.04 K/UL    DF AUTOMATED     METABOLIC PANEL, COMPREHENSIVE Collection Time: 04/27/21  7:37 AM   Result Value Ref Range    Sodium 143 136 - 145 mmol/L    Potassium 3.2 (L) 3.5 - 5.1 mmol/L    Chloride 108 97 - 108 mmol/L    CO2 29 21 - 32 mmol/L    Anion gap 6 5 - 15 mmol/L    Glucose 104 (H) 65 - 100 mg/dL    BUN 19 6 - 20 mg/dL    Creatinine 0.55 0.55 - 1.02 mg/dL    BUN/Creatinine ratio 35 (H) 12 - 20      GFR est AA >60 >60 ml/min/1.73m2    GFR est non-AA >60 >60 ml/min/1.73m2    Calcium 12.2 (H) 8.5 - 10.1 mg/dL    Bilirubin, total 0.6 0.2 - 1.0 mg/dL    AST (SGOT) 16 15 - 37 U/L    ALT (SGPT) 14 12 - 78 U/L    Alk. phosphatase 110 45 - 117 U/L    Protein, total 6.7 6.4 - 8.2 g/dL    Albumin 2.4 (L) 3.5 - 5.0 g/dL    Globulin 4.3 (H) 2.0 - 4.0 g/dL    A-G Ratio 0.6 (L) 1.1 - 2.2         CT neck soft tissue:IMPRESSION  1.  2.3 x 1.7 x 3.6 cm enhancing mass in the posterior right parotid gland. Smaller soft tissue nodules are present in the inferior left parotid gland with  an additional nodule suspected in the right parotid gland anterior to the  dominant mass. Overall, in a patient with suspected lung malignancy findings are  worrisome for metastatic intraparotid adenopathy. Correlation with PET/CT may be  helpful as clinically appropriate. 2.  Chronic right maxillary sinusitis    CT chest with contrast:IMPRESSION  1. Large mass centered at the interface between the right lower lobe and the  mediastinum, as detailed above, highly suspicious for neoplasm with leading  differential consideration of lung cancer. 2. The mass abuts several mediastinal structures, including the esophagus and  the heart with direct invasion difficult to exclude on this exam.  3. T8 compression fracture of unknown chronicity. The mass also abuts the  vertebral body making a pathologic fracture possible. 4. Erosion of the anterior right lateral aspect of the T9 vertebral body which  is suspicious for direct invasion by the mass.   5. Enlarged right hilar lymph node, suspicious for disease involvement. 6. Indeterminate 4 mm right middle lobe solid nodule. Reviewed most current lab test results and cultures  YES  Reviewed most current radiology test results   YES  Review and summation of old records today    NO  Reviewed patient's current orders and MAR    YES  PMH/SH reviewed - no change compared to H&P    Assessment/plan:    Lung mass/pneumoniapatient presents with above  -mentioned symptomatology was found to have a significant lung mass between the right lower lobe as well as mediastinotomy concerning for malignancy given unexpected weight loss as well as possible con commitment infection with evidence of leukocytosis, s/p bronchoscopy/biopsy  Continue Zosyn for antibiotic coverage  Pulmonology consult appreciated  Oncology consult appreciated  Confusion/hallucinations reported- follow ct head  S/p eus/bx mediastinal mass 4/27-f/u bx     Anemia  patient presents with above-mentioned symptomatology and was being worked up for anemia as an outpatient with concerns for possible GI bleeding, patient was scheduled to have an outpatient endoscopy, had it today which showed extrinsic compression of esophagus- no bleeding mentioned. Maintain active type and screen  Monitor h&h, stable today 8.7(4/27)  Hematology consult appreciated  Gastroenterology consult appreciated     Hypokalemiareplete and follow, ivf d5 1/2 + 40 kcl x 1 liter     Oral thrushof note patient has evidence of oral thrush upon physical examination  continue nystatin 4 times daily  Continue to monitor    Ams:  Encephalopathic- flexeril? Mets? Start with holding flexeril, follow ct brain. Urinary Tract infection -   UA consistent with UTI, pt does not meet SIRS/sepsis criteria at this time  Follow up blood cultures- ngd6  Follow up urine cultures-ngd2  No ucs.   Continue Zosyn for abx coverage day 7  Continue to monitor     ProphylaxisSCDs, currently holding off on Lovenox given concern for possible GI bleed  FENcardiac diet, cardiac diet, replete potassium and magnesium  Full code,  Disposition - pending clinical improvement, Pulmonology/Oncology/Gastroenterology evaluation/clearance________________________________________________________________________  Care Plan discussed with:    Comments   Patient X    Family  X    RN X    Care Manager x    Consultant  x                      Multidiciplinary team rounds were held today with , nursing, pharmacist and clinical coordinator. Patient's plan of care was discussed; medications were reviewed and discharge planning was addressed.      ________________________________________________________________________  Total NON critical care TIME:  35  Minutes      Comments   >50% of visit spent in counseling and coordination of care X    ________________________________________________________________________  Lesly Berry MD

## 2021-04-27 NOTE — PROGRESS NOTES
Progress Note Patient: Lauren Ramsey MRN: [id number]  SSN: [id number] YOB: 1945  Age: 76 y.o. Sex: female Admit Date: 4/20/2021 LOS: 7 days Subjective:  
 
Seen patient in bed, sleepy, arousable. She appears tired, comfortable on room air. Daughter at bedside. Patient had EUS and PEG tube placement today. Objective:  
 
Vitals:  
 04/27/21 0800 04/27/21 1103 04/27/21 1110 04/27/21 1115 BP: (!) 154/59 124/67 138/69 131/73 Pulse: 96 74 79 80 Resp: 18 16 15 16 Temp: 98.2 °F (36.8 °C) 97.3 °F (36.3 °C)  97.2 °F (36.2 °C) SpO2: 95% 100% 100% 99% Weight:      
Height:      
  
 
Intake and Output: 
Current Shift: 04/27 0701 - 04/27 1900 In: 1000 [I.V.:1000] Out: - Last three shifts: 04/25 1901 - 04/27 0700 In: -  
Out: [facility] Physical Exam:  
Skin:  Extremities and face reveal no rashes. No callaway erythema. HEENT: Sclerae anicteric. Extra-occular muscles are intact. No abnormal pigmentation of the lips. The neck is supple. Cardiovascular: Regular rate and rhythm. Respiratory:  Comfortable breathing with no accessory muscle use. GI:  Abdomen nondistended, soft, and nontender. No enlargement of the liver or spleen. No masses palpable. PEG in place, covered with gauze and binder. No bleeding. Rectal:  Deferred Musculoskeletal: Extremities have good range of motion. Neurological:  Gross memory appears intact. Patient is alert and oriented. Psychiatric:  Mood appears appropriate with judgement intact. Lymphatic:  No visible adenopathy Lab/Data Review: 
Recent Results (from the past 24 hour(s)) METABOLIC PANEL, BASIC Collection Time: 04/26/21  9:33 PM  
Result Value Ref Range Sodium 140 136 - 145 mmol/L Potassium 3.7 3.5 - 5.1 mmol/L Chloride 105 97 - 108 mmol/L  
 CO2 28 21 - 32 mmol/L Anion gap 7 5 - 15 mmol/L Glucose 74 65 - 100 mg/dL BUN 15 6 - 20 mg/dL Creatinine 0.57 0.55 - 1.02 mg/dL  BUN/Creatinine ratio 26 (H) 12 - 20 GFR est AA >60 >60 ml/min/1.73m2 GFR est non-AA >60 >60 ml/min/1.73m2 Calcium 12.0 (H) 8.5 - 10.1 mg/dL CBC WITH AUTOMATED DIFF Collection Time: 04/27/21  7:37 AM  
Result Value Ref Range WBC 20.5 (H) 3.6 - 11.0 K/uL  
 RBC 3.52 (L) 3.80 - 5.20 M/uL HGB 8.7 (L) 11.5 - 16.0 g/dL HCT 29.0 (L) 35.0 - 47.0 % MCV 82.4 80.0 - 99.0 FL  
 MCH 24.7 (L) 26.0 - 34.0 PG  
 MCHC 30.0 30.0 - 36.5 g/dL  
 RDW 18.3 (H) 11.5 - 14.5 % PLATELET 888 (H) 946 - 400 K/uL MPV 8.6 (L) 8.9 - 12.9 FL  
 NEUTROPHILS 94 (H) 32 - 75 % LYMPHOCYTES 3 (L) 12 - 49 % MONOCYTES 3 (L) 5 - 13 % EOSINOPHILS 0 0 - 7 % BASOPHILS 0 0 - 1 % IMMATURE GRANULOCYTES 0 0.0 - 0.5 % ABS. NEUTROPHILS 19.3 (H) 1.8 - 8.0 K/UL  
 ABS. LYMPHOCYTES 0.6 (L) 0.8 - 3.5 K/UL  
 ABS. MONOCYTES 0.6 0.0 - 1.0 K/UL  
 ABS. EOSINOPHILS 0.0 0.0 - 0.4 K/UL  
 ABS. BASOPHILS 0.0 0.0 - 0.1 K/UL  
 ABS. IMM. GRANS. 0.1 (H) 0.00 - 0.04 K/UL  
 DF AUTOMATED METABOLIC PANEL, COMPREHENSIVE Collection Time: 04/27/21  7:37 AM  
Result Value Ref Range Sodium 143 136 - 145 mmol/L Potassium 3.2 (L) 3.5 - 5.1 mmol/L Chloride 108 97 - 108 mmol/L  
 CO2 29 21 - 32 mmol/L Anion gap 6 5 - 15 mmol/L Glucose 104 (H) 65 - 100 mg/dL BUN 19 6 - 20 mg/dL Creatinine 0.55 0.55 - 1.02 mg/dL BUN/Creatinine ratio 35 (H) 12 - 20 GFR est AA >60 >60 ml/min/1.73m2 GFR est non-AA >60 >60 ml/min/1.73m2 Calcium 12.2 (H) 8.5 - 10.1 mg/dL Bilirubin, total 0.6 0.2 - 1.0 mg/dL AST (SGOT) 16 15 - 37 U/L  
 ALT (SGPT) 14 12 - 78 U/L Alk. phosphatase 110 45 - 117 U/L Protein, total 6.7 6.4 - 8.2 g/dL Albumin 2.4 (L) 3.5 - 5.0 g/dL Globulin 4.3 (H) 2.0 - 4.0 g/dL A-G Ratio 0.6 (L) 1.1 - 2.2 XR ABD (AP AND ERECT OR DECUB) Final Result Findings/impression:  
  
Nonspecific gaseous distention of the stomach. No definite pneumoperitoneum. Moderate stool in the rectum. Right perihilar mass lesion partially visualized. See recently performed chest  
CT. No acute osseous abnormality identified. XR CHEST PORT Final Result XR FLUOROSCOPY UNDER 60 MINUTES Final Result CT NECK SOFT TISSUE W CONT Final Result 1.  2.3 x 1.7 x 3.6 cm enhancing mass in the posterior right parotid gland. Smaller soft tissue nodules are present in the inferior left parotid gland with  
an additional nodule suspected in the right parotid gland anterior to the  
dominant mass. Overall, in a patient with suspected lung malignancy findings are  
worrisome for metastatic intraparotid adenopathy. Correlation with PET/CT may be  
helpful as clinically appropriate. 2.  Chronic right maxillary sinusitis. CT CHEST W CONT Final Result 1. Large mass centered at the interface between the right lower lobe and the  
mediastinum, as detailed above, highly suspicious for neoplasm with leading  
differential consideration of lung cancer. 2. The mass abuts several mediastinal structures, including the esophagus and  
the heart with direct invasion difficult to exclude on this exam.  
3. T8 compression fracture of unknown chronicity. The mass also abuts the  
vertebral body making a pathologic fracture possible. 4. Erosion of the anterior right lateral aspect of the T9 vertebral body which  
is suspicious for direct invasion by the mass. 5. Enlarged right hilar lymph node, suspicious for disease involvement. 6. Indeterminate 4 mm right middle lobe solid nodule. XR CHEST PORT Final Result 1. Right hilar fullness and increased density with suspicion for potential right  
hilar mass. Recommend further evaluation with contrast enhanced chest CT. 2. See above for additional comments. The above was discussed with and acknowledged by Dr. Anjel Casper by telephone  
on  4/20/2021 4:09 PM. Assessment:  
 
Active Problems: 
  Lung mass (4/20/2021) Pneumonia (4/20/2021) Plan:  
-PEG tube placed for nutrition support. May use in 6 hours after the procedure. Bolster mild compression to skin wall. Clean wound with peroxide and water and apply triple antibiotic cream twice a day. Diet per speech pathology. Dietary consult for feed formula and volume. -EUS -hilar mass in the mediastinum invading the parietal pleura 6.7 cm x 7 cm; this was biopsied with FNA. -Mediastinal tumor versus adenopathy. Subclavian lymphadenopathy was noted. Extrinsic compression of the esophagus at 30 cm. -Diet per speech pathology. No anticoagulants for 3 days. Follow-up in GI clinic after discharge. Patient discussed with Dr Karely Goel and agrees to above impression and plan. Thank you for allowing me to participate in this patients care Signed By: Shannan Carmichael NP April 27, 2021

## 2021-04-27 NOTE — ANESTHESIA POSTPROCEDURE EVALUATION
Procedure(s):  PERCUTANEOUS ENDOSCOPIC GASTROSTOMY TUBE INSERTION  ENDOSCOPIC ULTRASOUND (EUS)  FINE NEEDLE ASPIRATION.     general    Anesthesia Post Evaluation      Multimodal analgesia: multimodal analgesia used between 6 hours prior to anesthesia start to PACU discharge  Patient location during evaluation: bedside  Patient participation: complete - patient participated  Level of consciousness: awake  Pain score: 0  Airway patency: patent  Anesthetic complications: no  Cardiovascular status: acceptable  Respiratory status: acceptable  Hydration status: acceptable  Post anesthesia nausea and vomiting:  none  Final Post Anesthesia Temperature Assessment:  Normothermia (36.0-37.5 degrees C)      INITIAL Post-op Vital signs:   Vitals Value Taken Time   /73 04/27/21 1115   Temp 36.2 °C (97.2 °F) 04/27/21 1115   Pulse 80 04/27/21 1115   Resp 16 04/27/21 1115   SpO2 99 % 04/27/21 1115

## 2021-04-27 NOTE — PROGRESS NOTES
Comprehensive Nutrition Assessment    Type and Reason for Visit: Reassess(goal)    Nutrition Recommendations/Plan:   Initiate TF via PEG of Osmolite 1.2 at 20mL/hr  Increase by 20mL q4hr to goal 55mL/hr, continuous  Flush with 60mL free water q4hr  Goal feeds provide 1584kcal, 73g protein, 1382mL fluid (~106%kcal, 97%pro, 115%mL fluid)    Document TF rate, protein modular provision, water flushes, and GRVs in EMR    Nutrition Assessment:  Admitted for SOB, generalized weakness, wt loss, difficulty swallowing. CT neck and chest finding large RLL mass with additional nodules, concerning for malignancy. Heme/onc consulted. Bronch (4/22)- biopsies taken. 4/27- EDG finding extrinsic compression of esophagus, EUS mediastinal mass performed. S/p PEG placement d/t inability to consume adequate PO, and unsafe for PO. RD to provide TF recs. Previously ordered Cardiac diet with supplementation- very little PO consumed. Noted thrush also previously impacting PO intakes. Pt apparently confused, hallucinating at this time. Discussion of hospice initiated, pt unable to make decision at this time. Labs: H/H 8.7/29.0, Cr 0.55, BUN 19, K 3.2, , LFTs wnl. Meds: Zosyn, dilaudid, MVI, morphine, flexeril. Malnutrition Assessment:  Malnutrition Status:  Severe malnutrition    Context:  Chronic illness     Findings of the 6 clinical characteristics of malnutrition:   Energy Intake:  7 - 75% or less est energy requirements for 1 month or longer  Weight Loss:  7.00 - Greater than 10% over 6 months     Body Fat Loss:  7 - Severe body fat loss, Orbital, Fat overlying ribs, Buccal region   Muscle Mass Loss:  7 - Severe muscle mass loss(mild/moderate to temporalis), Clavicles (pectoralis &deltoids), Calf (gastrocnemius), Scapula (trapezius)  Fluid Accumulation:  No significant fluid accumulation,      Estimated Daily Nutrient Needs:  Energy (kcal): 1500kcal (40kcal/kg);  Weight Used for Energy Requirements: Current  Protein (g): 75g (2g/kg); Weight Used for Protein Requirements: Current  Fluid (ml/day): 1200mL (30mL/kg); Method Used for Fluid Requirements: ml/kg    Nutrition Related Findings:  NFPE +severe muscle and fat losses. Pt appears very cachectic. +Chewing/Swallowing difficulty per pt. +Thrush. +Taste changes. Denied n/v. Last BM 4/25, +BS. No edema. Wounds:    None       Current Nutrition Therapies:  DIET NPO    Anthropometric Measures:  · Height:  5' 8\" (172.7 cm)  · Current Body Wt:  37.2 kg (82 lb 0.2 oz)(4/23)   · Admission Body Wt:  87 lb 8.4 oz    · Usual Body Wt:  54.4 kg (120 lb)(12/2020)     · Ideal Body Wt:  140 lbs:  58.6 %   · BMI Category:  Underweight (BMI less than 18.5)     Wt Readings from Last 5 Encounters:   04/21/21 39.7 kg (87 lb 7.7 oz)   04/01/21 43.1 kg (95 lb)   03/17/21 43.5 kg (96 lb)   Per pt report, wt loss of 17kg (>30%BW) x 4 months - Severe. Per stated wts x1 month ago, pt with 4.5kg wt loss (10%BW) x 1 month    Nutrition Diagnosis:   · Severe malnutrition related to inadequate protein-energy intake as evidenced by BMI, weight loss greater than or equal to 10% in 6 months, severe muscle loss, severe loss of subcutaneous fat    Nutrition Interventions:   Food and/or Nutrient Delivery: Modify current diet, Start oral nutrition supplement(If consistent with GOC, pt may benefit from TF for nutrition)  Nutrition Education and Counseling: No recommendations at this time  Coordination of Nutrition Care: Continue to monitor while inpatient    Goals:  Meet >75% est needs via PO vs TF x 7 days, Wt gain 0.5kg/week, Lytes WNL       Nutrition Monitoring and Evaluation:   Behavioral-Environmental Outcomes: None identified  Food/Nutrient Intake Outcomes: Food and nutrient intake, Supplement intake  Physical Signs/Symptoms Outcomes: Chewing or swallowing, Weight, Skin, GI status, Nutrition focused physical findings    Discharge Planning:     Too soon to determine     Electronically signed by Gilberto Terry on 4/27/2021 at 4:03 PM    Early goal for 4/28    Contact:

## 2021-04-27 NOTE — ROUTINE PROCESS
Bedside and Verbal shift change report given to Aishwarya Garcia RN  (oncoming nurse) by Laura Beebe (offgoing nurse). Report included the following information SBAR, MAR and Recent Results.

## 2021-04-27 NOTE — PROGRESS NOTES
Hematology and Oncology Progress Note Patient: Sasha Kelly MRN: [id number]  SSN: [id number] YOB: 1945  Age: 76 y.o. Sex: female Admit Date: 4/20/2021 LOS: 7 days Chief Complaint: Patient has dyspnea Subjective:  
Patient sleepy. Objective:  
 
Vitals:  
 04/27/21 0800 04/27/21 1103 04/27/21 1110 04/27/21 1115 BP: (!) 154/59 124/67 138/69 131/73 Pulse: 96 74 79 80 Resp: 18 16 15 16 Temp: 98.2 °F (36.8 °C) 97.3 °F (36.3 °C)  97.2 °F (36.2 °C) SpO2: 95% 100% 100% 99% Weight:      
Height:      
  
Physical Exam: 
Constitutional: Elderly white female looks chronically ill. Not in any acute distress or pain. Eyes: Sclerae anicteric. Conjunctivae shows pallor. ENMT: Has mask on. Patient has bitemporal wasting. She also has slight puffiness around her eyes. Neck: No adenopathy. Respiratory: Lungs are clear bilaterally. Cardiovascular: Normal sinus rhythm. Extremities: She does have bilateral lower extremity swelling. Skin: No petechiae; no skin rash. Neurologic: Alert/oriented x 3. Lab/Data Review: 
 
Recent Results (from the past 24 hour(s)) METABOLIC PANEL, BASIC Collection Time: 04/26/21  9:33 PM  
Result Value Ref Range Sodium 140 136 - 145 mmol/L Potassium 3.7 3.5 - 5.1 mmol/L Chloride 105 97 - 108 mmol/L  
 CO2 28 21 - 32 mmol/L Anion gap 7 5 - 15 mmol/L Glucose 74 65 - 100 mg/dL BUN 15 6 - 20 mg/dL Creatinine 0.57 0.55 - 1.02 mg/dL BUN/Creatinine ratio 26 (H) 12 - 20 GFR est AA >60 >60 ml/min/1.73m2 GFR est non-AA >60 >60 ml/min/1.73m2 Calcium 12.0 (H) 8.5 - 10.1 mg/dL CBC WITH AUTOMATED DIFF Collection Time: 04/27/21  7:37 AM  
Result Value Ref Range WBC 20.5 (H) 3.6 - 11.0 K/uL  
 RBC 3.52 (L) 3.80 - 5.20 M/uL HGB 8.7 (L) 11.5 - 16.0 g/dL HCT 29.0 (L) 35.0 - 47.0 % MCV 82.4 80.0 - 99.0 FL  
 MCH 24.7 (L) 26.0 - 34.0 PG  
 MCHC 30.0 30.0 - 36.5 g/dL  
 RDW 18.3 (H) 11.5 - 14.5 % PLATELET 510 (H) 904 - 400 K/uL MPV 8.6 (L) 8.9 - 12.9 FL  
 NEUTROPHILS 94 (H) 32 - 75 % LYMPHOCYTES 3 (L) 12 - 49 % MONOCYTES 3 (L) 5 - 13 % EOSINOPHILS 0 0 - 7 % BASOPHILS 0 0 - 1 % IMMATURE GRANULOCYTES 0 0.0 - 0.5 % ABS. NEUTROPHILS 19.3 (H) 1.8 - 8.0 K/UL  
 ABS. LYMPHOCYTES 0.6 (L) 0.8 - 3.5 K/UL  
 ABS. MONOCYTES 0.6 0.0 - 1.0 K/UL  
 ABS. EOSINOPHILS 0.0 0.0 - 0.4 K/UL  
 ABS. BASOPHILS 0.0 0.0 - 0.1 K/UL  
 ABS. IMM. GRANS. 0.1 (H) 0.00 - 0.04 K/UL  
 DF AUTOMATED METABOLIC PANEL, COMPREHENSIVE Collection Time: 04/27/21  7:37 AM  
Result Value Ref Range Sodium 143 136 - 145 mmol/L Potassium 3.2 (L) 3.5 - 5.1 mmol/L Chloride 108 97 - 108 mmol/L  
 CO2 29 21 - 32 mmol/L Anion gap 6 5 - 15 mmol/L Glucose 104 (H) 65 - 100 mg/dL BUN 19 6 - 20 mg/dL Creatinine 0.55 0.55 - 1.02 mg/dL BUN/Creatinine ratio 35 (H) 12 - 20 GFR est AA >60 >60 ml/min/1.73m2 GFR est non-AA >60 >60 ml/min/1.73m2 Calcium 12.2 (H) 8.5 - 10.1 mg/dL Bilirubin, total 0.6 0.2 - 1.0 mg/dL AST (SGOT) 16 15 - 37 U/L  
 ALT (SGPT) 14 12 - 78 U/L Alk. phosphatase 110 45 - 117 U/L Protein, total 6.7 6.4 - 8.2 g/dL Albumin 2.4 (L) 3.5 - 5.0 g/dL Globulin 4.3 (H) 2.0 - 4.0 g/dL A-G Ratio 0.6 (L) 1.1 - 2.2 Assessment and plan:  
 
79-year-old white female who has shortness of breath dysphagia and weight loss. Patient had CT of chest which shows right hilar mass with hilar adenopathy and possible T8 vertebral erosion. Obviously this is malignancy possible lung primary.  
-Patient's bronchoscopy specimen was nondiagnostic. S/p Peg tube and EUS and FNA. Pathology results pending.-Based on surgical pathology will make further recommendations. Due to pt general condition pt not a good candidate for chemotherapy. 
-Patient has leukocytosis and thrombocytosis both are reactive possibly from malignancy.  She had hematologic work-up and it was negative for any myeloproliferative disorders. Anemia:monitor. Leukocytosis:White count going up. Thrombocytosis:possible reactive. Check peripheral smear. Will discuss with pt family. Signed By: Jo Milan MD   
 April 27, 2021

## 2021-04-27 NOTE — PROGRESS NOTES
Pulmonary Progress Note      NAME: Magui Zamorano   :  1945  MRM:  [id number]    Date/Time: 2021  5:38 PM         Subjective:     Patient evaluated in her room. Daughter was also present at bedside. Patient ultrasound-guided endoscopy needle aspiration biopsy done for mediastinal mass, PEG tube was also placed. She is sleepy, looks comfortable. Remains on room air. Past Medical History reviewed and unchanged from Admission History and Physical        Objective:     Physical Exam     Vitals:      Last 24hrs VS reviewed since prior progress note. Most recent are:    Visit Vitals  /73   Pulse 80   Temp 97.2 °F (36.2 °C)   Resp 16   Ht 5' 8\" (1.727 m)   Wt 37.2 kg (81 lb 15.8 oz)   SpO2 99%   Breastfeeding No   BMI 12.47 kg/m²     SpO2 Readings from Last 6 Encounters:   21 99%   21 96%   21 97%    O2 Flow Rate (L/min): 4 l/min       Intake/Output Summary (Last 24 hours) at 2021 1400  Last data filed at 2021 1056  Gross per 24 hour   Intake 1000 ml   Output 1 ml   Net 999 ml      GENERAL:  The patient is an elderly, frail  female who does not appear to be in any distress. Saturation is 97% on room air. HEENT:  Showed pupils are equal and reactive to light. Pallor is noted. No icterus. Nasal passages are patent. Oropharynx is without obvious thrush. NECK:  Supple. Trachea is central.  No JVD or lymphadenopathy. The patient stated that she had a lump on the right side, which has grown away. She is not using any accessory muscles of respiration. However, she has had significant weight loss, with prominent supraclavicular fossa. CHEST:  Symmetrical with equal and fair air entry bilaterally. Breath sounds are diminished in general.    No wheezing or rhonchi today. No chest wall tenderness. HEART:  Rhythm is regular without any loud murmur or gallop. ABDOMEN:  Soft and benign. Bowel sounds are audible.     Has PEG tube placed EXTREMITIES: Did not show any cyanosis or clubbing. No pitting edema. Pulses are palpable. NEUROLOGIC:  Grossly intact. SKIN:  Warm and dry. XR ABD (AP AND ERECT OR DECUB)   Final Result   Findings/impression:      Nonspecific gaseous distention of the stomach. No definite pneumoperitoneum. Moderate stool in the rectum. Right perihilar mass lesion partially visualized. See recently performed chest   CT. No acute osseous abnormality identified. XR CHEST PORT   Final Result      XR FLUOROSCOPY UNDER 60 MINUTES   Final Result      CT NECK SOFT TISSUE W CONT   Final Result   1.  2.3 x 1.7 x 3.6 cm enhancing mass in the posterior right parotid gland. Smaller soft tissue nodules are present in the inferior left parotid gland with   an additional nodule suspected in the right parotid gland anterior to the   dominant mass. Overall, in a patient with suspected lung malignancy findings are   worrisome for metastatic intraparotid adenopathy. Correlation with PET/CT may be   helpful as clinically appropriate. 2.  Chronic right maxillary sinusitis. CT CHEST W CONT   Final Result   1. Large mass centered at the interface between the right lower lobe and the   mediastinum, as detailed above, highly suspicious for neoplasm with leading   differential consideration of lung cancer. 2. The mass abuts several mediastinal structures, including the esophagus and   the heart with direct invasion difficult to exclude on this exam.   3. T8 compression fracture of unknown chronicity. The mass also abuts the   vertebral body making a pathologic fracture possible. 4. Erosion of the anterior right lateral aspect of the T9 vertebral body which   is suspicious for direct invasion by the mass. 5. Enlarged right hilar lymph node, suspicious for disease involvement. 6. Indeterminate 4 mm right middle lobe solid nodule. XR CHEST PORT   Final Result      1.  Right hilar fullness and increased density with suspicion for potential right   hilar mass. Recommend further evaluation with contrast enhanced chest CT. 2. See above for additional comments.       The above was discussed with and acknowledged by Dr. Gregory Soriano by telephone   on  4/20/2021 4:09 PM.          Lab Data Reviewed: (see below)      Medications:  Current Facility-Administered Medications   Medication Dose Route Frequency    lidocaine (XYLOCAINE) 10 mg/mL (1 %) injection        propofoL (DIPRIVAN) 10 mg/mL injection        ePHEDrine sulfate (AKOVAZ) 50 mg/mL injection        dexamethasone (DECADRON) 4 mg/mL injection        ondansetron (ZOFRAN) 4 mg/2 mL injection        metoprolol (LOPRESSOR) 5 mg/5 mL injection        metoprolol (LOPRESSOR) 5 mg/5 mL injection        HYDROmorphone (DILAUDID) syringe 0.5 mg  0.5 mg IntraVENous Q3H PRN    0.9% sodium chloride infusion  50 mL/hr IntraVENous CONTINUOUS    EPINEPHrine (ADRENALIN) 0.1 mg/mL syringe    PRN    lidocaine (XYLOCAINE) 10 mg/mL (1 %) injection    PRN    mineral oil (topical)    PRN    albuterol-ipratropium (DUO-NEB) 2.5 MG-0.5 MG/3 ML  3 mL Nebulization Q4H PRN    melatonin tablet 6 mg  6 mg Oral QHS    nystatin (MYCOSTATIN) 100,000 unit/mL oral suspension 500,000 Units  500,000 Units Oral QID    multivitamin, tx-iron-ca-min (THERA-M w/ IRON) tablet 1 Tab  1 Tab Oral DAILY    lidocaine (XYLOCAINE) 4 % cream   Topical BID    cyclobenzaprine (FLEXERIL) tablet 10 mg  10 mg Oral TID    sodium chloride (NS) flush 5-40 mL  5-40 mL IntraVENous Q8H    sodium chloride (NS) flush 5-40 mL  5-40 mL IntraVENous PRN    acetaminophen (TYLENOL) tablet 650 mg  650 mg Oral Q6H PRN    Or    acetaminophen (TYLENOL) suppository 650 mg  650 mg Rectal Q6H PRN    polyethylene glycol (MIRALAX) packet 17 g  17 g Oral DAILY PRN    promethazine (PHENERGAN) tablet 12.5 mg  12.5 mg Oral Q6H PRN    Or    ondansetron (ZOFRAN) injection 4 mg  4 mg IntraVENous Q6H PRN    piperacillin-tazobactam (ZOSYN) 3.375 g in 0.9% sodium chloride (MBP/ADV) 100 mL MBP  3.375 g IntraVENous Q8H     Facility-Administered Medications Ordered in Other Encounters   Medication Dose Route Frequency    ePHEDrine in NS (PF) (MISTOLE) 10 mg/mL in NS syringe   IntraVENous PRN    propofoL (DIPRIVAN) 10 mg/mL injection   IntraVENous PRN    rocuronium injection   IntraVENous PRN    lactated Ringers infusion   IntraVENous CONTINUOUS    fentaNYL citrate (PF) injection   IntraVENous PRN    dexamethasone (DECADRON) 4 mg/mL injection   IntraVENous PRN    ondansetron (ZOFRAN) injection   IntraVENous PRN    neostigmine (PROSTIGMINE) injection   IntraVENous PRN    glycopyrrolate (ROBINUL) injection   IntraVENous PRN       ______________________________________________________________________      Lab Review:     Recent Labs     04/27/21  0737 04/26/21  0659 04/25/21  1030   WBC 20.5* 16.0* 13.4*   HGB 8.7* 8.4* 7.6*   HCT 29.0* 27.7* 25.1*   * 534* 350     Recent Labs     04/27/21  0737 04/26/21  2133 04/26/21  1255 04/26/21  0659 04/25/21  1030    140  --  141 138   K 3.2* 3.7 2.7* 2.6* 3.0*    105  --  104 105   CO2 29 28  --  31 29   * 74  --  88 95   BUN 19 15  --  11 11   CREA 0.55 0.57  --  0.48* 0.48*   CA 12.2* 12.0*  --  11.1* 10.7*   MG  --   --   --  2.1 2.2   ALB 2.4*  --   --   --   --    ALT 14  --   --   --   --      No components found for: GLPOC  No results for input(s): PH, PCO2, PO2, HCO3, FIO2 in the last 72 hours. No results for input(s): INR, INREXT, INREXT, INREXT in the last 72 hours. Other pertinent lab:   CT of the chest done on 04/20/2021 was personally reviewed. She has a large right lower lobe medially located heterogeneous mass, which measures 8.1 cm in maximum dimension. There is also T8 compression fracture. Increased right hilar lymphadenopathy is described. Influenza A and B is negative. COVID-19 rapid test is negative. Assessment & Plan:      1.   A 66-year-old  female with a history of extensive and ongoing tobacco use presenting with weight loss and back pain. She has a large mass in the right lower lobe located medially adjacent to the mediastinum and probably involving the mediastinum. It is measuring 8.1 cm in size. It is heterogeneous. There is right hilar lymphadenopathy. CT images were personally reviewed with the radiologist.  Most likely, it appears to be lung cancer. There also appears to be T8 compression fracture causing significant back pain. The patient underwent bronchoscopy on 4/22. There was extrinsic compression noted in the distal bronchus intermedius. However I was able to pass the bronchoscope in the right middle lobe and the right lower lobe segments. Biopsies were taken from this area. 2 transbronchial biopsies were also taken. Cytology brushings and washings were obtained. Preliminary report shows no evidence of malignancy. Patient underwent endoscopic ultrasound-guided biopsy of mediastinal mass today, she also had PEG tube placed for dysphagia. Well. Currently sleepy. Without any pain. We will wait for results of the biopsy. Discussed with daughter at bedside  Getting potassium replaced. 2.  Chronic obstructive pulmonary disease. There are significant emphysematous changes on the CT scan. Continue with nebulized bronchodilator treatments at this time. She is comfortable on room air. 3.  Back pain. This is due to compression fracture which may be pathological fractures. Advocate judicious pain control. Hematology/Oncology consulted. 4.  Anemia. 5.  For deep vein thrombosis prophylaxis, on sequential compression devices to the lower extremities. Patient has elevated WBC count and has been on IV Zosyn. Thank you for allowing me to participate in the care of this patient. More than 30 minutes spent with patient care.     Tiffanie Davis MD  [facility]

## 2021-04-28 NOTE — PROGRESS NOTES
Progress Note Patient: Radha Sibley MRN: [id number]  SSN: [id number] YOB: 1945  Age: 76 y.o. Sex: female Admit Date: 4/20/2021 LOS: 8 days Subjective:  
 
Patient seen in room, sleeping soundly.  at bedside. EUS and Peg tube placement done yesterday. She is tolerating her tube feedings. -GI is following for anemia consult. Patient was admitted to the hospital with complaints of shortness of breath, generalized weakness, and lost of weight since December. CT of chest and neck showed a large mass in the right lower lower lobe and mass in the right parotid gland, concerns for malignancy. Objective:  
 
Vitals:  
 04/28/21 0006 04/28/21 2334 04/28/21 2723 04/28/21 1655 BP: (!) 145/81 130/72 (!) 151/79 127/71 Pulse: 100 83  98 Resp: 16 16  16 Temp: 97.4 °F (36.3 °C) 97.8 °F (36.6 °C)  97.7 °F (36.5 °C) SpO2: 94% 93% 93% 93% Weight:      
Height:      
  
 
Intake and Output: 
Current Shift: No intake/output data recorded. Last three shifts: 04/26 1901 - 04/28 0700 In: 1060 [I.V.:1000] Out: 1 [Urine:1] Physical Exam:  
Skin:  Extremities and face reveal no rashes. No callaway erythema. HEENT: Sclerae anicteric. Extra-occular muscles are intact. No abnormal pigmentation of the lips. The neck is supple. Cardiovascular: Regular rate and rhythm. Respiratory:  Comfortable breathing with no accessory muscle use. GI:  Abdomen nondistended, soft, and nontender. No enlargement of the liver or spleen. No masses palpable. Rectal:  Deferred Musculoskeletal: Extremities have good range of motion. Neurological:  Gross memory appears intact. Patient is alert and oriented. Psychiatric:  Mood appears appropriate with judgement intact. Lymphatic:  No visible adenopathy Lab/Data Review: 
Recent Results (from the past 24 hour(s)) GLUCOSE, POC Collection Time: 04/27/21  9:04 PM  
Result Value Ref Range  Glucose (POC) 127 (H) 65 - 100 mg/dL Performed by Suzie Gilliam METABOLIC PANEL, BASIC Collection Time: 04/28/21  7:28 AM  
Result Value Ref Range Sodium 146 (H) 136 - 145 mmol/L Potassium 3.4 (L) 3.5 - 5.1 mmol/L Chloride 113 (H) 97 - 108 mmol/L  
 CO2 28 21 - 32 mmol/L Anion gap 5 5 - 15 mmol/L Glucose 138 (H) 65 - 100 mg/dL BUN 23 (H) 6 - 20 mg/dL Creatinine 0.78 0.55 - 1.02 mg/dL BUN/Creatinine ratio 29 (H) 12 - 20 GFR est AA >60 >60 ml/min/1.73m2 GFR est non-AA >60 >60 ml/min/1.73m2 Calcium 12.5 (H) 8.5 - 10.1 mg/dL CBC WITH AUTOMATED DIFF Collection Time: 04/28/21  7:28 AM  
Result Value Ref Range WBC 17.5 (H) 3.6 - 11.0 K/uL  
 RBC 3.42 (L) 3.80 - 5.20 M/uL HGB 8.5 (L) 11.5 - 16.0 g/dL HCT 28.4 (L) 35.0 - 47.0 % MCV 83.0 80.0 - 99.0 FL  
 MCH 24.9 (L) 26.0 - 34.0 PG  
 MCHC 29.9 (L) 30.0 - 36.5 g/dL  
 RDW 18.6 (H) 11.5 - 14.5 % PLATELET 043 (H) 418 - 400 K/uL MPV 8.6 (L) 8.9 - 12.9 FL  
 NEUTROPHILS 92 (H) 32 - 75 % LYMPHOCYTES 3 (L) 12 - 49 % MONOCYTES 5 5 - 13 % EOSINOPHILS 0 0 - 7 % BASOPHILS 0 0 - 1 % IMMATURE GRANULOCYTES 0 0.0 - 0.5 % ABS. NEUTROPHILS 16.0 (H) 1.8 - 8.0 K/UL  
 ABS. LYMPHOCYTES 0.6 (L) 0.8 - 3.5 K/UL  
 ABS. MONOCYTES 0.9 0.0 - 1.0 K/UL  
 ABS. EOSINOPHILS 0.0 0.0 - 0.4 K/UL  
 ABS. BASOPHILS 0.0 0.0 - 0.1 K/UL  
 ABS. IMM. GRANS. 0.1 (H) 0.00 - 0.04 K/UL  
 DF AUTOMATED    
  
 
 
 
 
CT HEAD WO CONT Final Result 1. No acute ischemia or intracranial mass. 2. Stable mass associated with the posterior aspect of the right parotid gland  
posterior to the right mandible. Recommend follow-up. XR ABD (AP AND ERECT OR DECUB) Final Result Findings/impression:  
  
Nonspecific gaseous distention of the stomach. No definite pneumoperitoneum. Moderate stool in the rectum. Right perihilar mass lesion partially visualized. See recently performed chest  
CT. No acute osseous abnormality identified.   
  
  
  
XR CHEST PORT  
Final Result XR FLUOROSCOPY UNDER 60 MINUTES Final Result CT NECK SOFT TISSUE W CONT Final Result 1.  2.3 x 1.7 x 3.6 cm enhancing mass in the posterior right parotid gland. Smaller soft tissue nodules are present in the inferior left parotid gland with  
an additional nodule suspected in the right parotid gland anterior to the  
dominant mass. Overall, in a patient with suspected lung malignancy findings are  
worrisome for metastatic intraparotid adenopathy. Correlation with PET/CT may be  
helpful as clinically appropriate. 2.  Chronic right maxillary sinusitis. CT CHEST W CONT Final Result 1. Large mass centered at the interface between the right lower lobe and the  
mediastinum, as detailed above, highly suspicious for neoplasm with leading  
differential consideration of lung cancer. 2. The mass abuts several mediastinal structures, including the esophagus and  
the heart with direct invasion difficult to exclude on this exam.  
3. T8 compression fracture of unknown chronicity. The mass also abuts the  
vertebral body making a pathologic fracture possible. 4. Erosion of the anterior right lateral aspect of the T9 vertebral body which  
is suspicious for direct invasion by the mass. 5. Enlarged right hilar lymph node, suspicious for disease involvement. 6. Indeterminate 4 mm right middle lobe solid nodule. XR CHEST PORT Final Result 1. Right hilar fullness and increased density with suspicion for potential right  
hilar mass. Recommend further evaluation with contrast enhanced chest CT. 2. See above for additional comments. The above was discussed with and acknowledged by Dr. Diogo Carrasco by telephone  
on  4/20/2021 4:09 PM. Assessment:  
 
Active Problems: 
  Lung mass (4/20/2021) Pneumonia (4/20/2021) Plan: PEG tube placed for nutrition support. Tolerating tube feedings well.   
EUS -hilar mass in the mediastinum invading the parietal pleura 6.7 cm x 7 cm; this was biopsied with FNA. -Mediastinal tumor versus adenopathy. Subclavian lymphadenopathy was noted. Extrinsic compression of the esophagus at 30 cm. Pending pathology result. Patient discussed with Dr Ben Sam and agrees to above impression and plan. Thank you for allowing me to participate in this patients care Signed By: Katie Carmichael NP April 28, 2021

## 2021-04-28 NOTE — PROGRESS NOTES
CM spoke to Jenae Gonzalez RN from [facility] regarding meeting with daughter. Ms. Janae Rothman reported that patient was upset that no one had called her about patient needing hospice. Ms. Janae Rothman reported that daughter does not want hospice or home health. CM spoke to patient at bedside. Patient presented as lethargic. CM spoke to Barrington Ventura and she reported that she had given patient medication.

## 2021-04-28 NOTE — PROGRESS NOTES
Primary Nurse Ricki Tillman RN and Melly Flood RN performed a dual skin assessment on this patient - PEG tube in place to abdomen with abdominal binder noted  , redness noted to perineum .

## 2021-04-28 NOTE — PROGRESS NOTES
Hospitalist Progress Note    NAME: Brenda Garza   :  1945   MRN:  [id number]       Subjective:     Chief Complaint / Reason for Physician Visit  Patient seen and evaluated at bedside,   Frail, confused, limited interaction. EGD , showed extrinsic compression in the mid esophagus consistent with CAT scan of the chest mass. PEG tube placed and eus of mediastinal mass performed. Feeds initiated. Eus bx-- suspicious for squamous cell ca, final path pending. Extensive d/w /daughter/patient with Dr Nancy Morley expressing poor prognosis 2/2 her general poor condition will not be candidate for chemo or immunotherapy and palliative care/hospice recommended. Daughter expressed understanding and it appears decision for hospice eval.  comments just wants her to be comfortable. Unfortunately with earlier hospice eval daughter waas not prepared for it. Remains on room air,   Afebrile. Nursing tells me she's remained confused with some hallucinations. Discussed with RN events overnight. Review of Systems:  Symptom Y/N Comments  Symptom Y/N Comments   Fever/Chills N   Chest Pain N    Poor Appetite Y   Edema N    Cough N   Abdominal Pain N    Sputum N   Joint Pain N    SOB/YOUNG N   Pruritis/Rash Y    Nausea/vomit N   Tolerating PT/OT NA    Diarrhea N   Tolerating Diet Y    Constipation N   Other       Could NOT obtain due to:    Patient denies any fevers nausea vomiting lightheadedness dizziness chest pain palpitations headache focal weakness loss of sensation auditory or visual symptoms abdominal stool or urinary complaints or any other associated symptoms. Objective:     VITALS:   Last 24hrs VS reviewed since prior progress note.  Most recent are:  Patient Vitals for the past 24 hrs:   Temp Pulse Resp BP SpO2   21 1655 97.7 °F (36.5 °C) 98 16 127/71 93 %   21 0843    (!) 151/79 93 %   21 0758 97.8 °F (36.6 °C) 83 16 130/72 93 %   21 0006 97.4 °F (36.3 °C) 100 16 (!) 145/81 94 %   04/27/21 1951 97.7 °F (36.5 °C) (!) 101 16 (!) 149/77 94 %       Intake/Output Summary (Last 24 hours) at 4/28/2021 1704  Last data filed at 4/28/2021 0020  Gross per 24 hour   Intake 60 ml   Output    Net 60 ml        PHYSICAL EXAM:  General: Patient appears cachectic however comfortable not in any acute distress    EENT:  EOMI. Anicteric sclerae. MMM  Resp:  Decreased air entry bilaterally in bilateral lower lung zones  CV:  Regular  rhythm, s1 + s2, no murmurs rubs or gallops  No edema  GI:  Soft, Non distended, Non tender. +Bowel sounds  Neurologic:  Alert and oriented X 2, normal speech   Psych:   Not anxious nor agitated  Skin:  No rashes. No jaundice    Procedures: see electronic medical records for all procedures/Xrays and details which were not copied into this note but were reviewed prior to creation of Plan. LABS:  I reviewed today's most current labs and imaging studies.   Pertinent labs include:  Recent Results (from the past 24 hour(s))   GLUCOSE, POC    Collection Time: 04/27/21  9:04 PM   Result Value Ref Range    Glucose (POC) 127 (H) 65 - 100 mg/dL    Performed by [address] [place] [address], BASIC    Collection Time: 04/28/21  7:28 AM   Result Value Ref Range    Sodium 146 (H) 136 - 145 mmol/L    Potassium 3.4 (L) 3.5 - 5.1 mmol/L    Chloride 113 (H) 97 - 108 mmol/L    CO2 28 21 - 32 mmol/L    Anion gap 5 5 - 15 mmol/L    Glucose 138 (H) 65 - 100 mg/dL    BUN 23 (H) 6 - 20 mg/dL    Creatinine 0.78 0.55 - 1.02 mg/dL    BUN/Creatinine ratio 29 (H) 12 - 20      GFR est AA >60 >60 ml/min/1.73m2    GFR est non-AA >60 >60 ml/min/1.73m2    Calcium 12.5 (H) 8.5 - 10.1 mg/dL   CBC WITH AUTOMATED DIFF    Collection Time: 04/28/21  7:28 AM   Result Value Ref Range    WBC 17.5 (H) 3.6 - 11.0 K/uL    RBC 3.42 (L) 3.80 - 5.20 M/uL    HGB 8.5 (L) 11.5 - 16.0 g/dL    HCT 28.4 (L) 35.0 - 47.0 %    MCV 83.0 80.0 - 99.0 FL    MCH 24.9 (L) 26.0 - 34.0 PG    MCHC 29.9 (L) 30.0 - 36.5 g/dL    RDW 18.6 (H) 11.5 - 14.5 %    PLATELET 860 (H) 776 - 400 K/uL    MPV 8.6 (L) 8.9 - 12.9 FL    NEUTROPHILS 92 (H) 32 - 75 %    LYMPHOCYTES 3 (L) 12 - 49 %    MONOCYTES 5 5 - 13 %    EOSINOPHILS 0 0 - 7 %    BASOPHILS 0 0 - 1 %    IMMATURE GRANULOCYTES 0 0.0 - 0.5 %    ABS. NEUTROPHILS 16.0 (H) 1.8 - 8.0 K/UL    ABS. LYMPHOCYTES 0.6 (L) 0.8 - 3.5 K/UL    ABS. MONOCYTES 0.9 0.0 - 1.0 K/UL    ABS. EOSINOPHILS 0.0 0.0 - 0.4 K/UL    ABS. BASOPHILS 0.0 0.0 - 0.1 K/UL    ABS. IMM. GRANS. 0.1 (H) 0.00 - 0.04 K/UL    DF AUTOMATED         CT neck soft tissue:IMPRESSION  1.  2.3 x 1.7 x 3.6 cm enhancing mass in the posterior right parotid gland. Smaller soft tissue nodules are present in the inferior left parotid gland with  an additional nodule suspected in the right parotid gland anterior to the  dominant mass. Overall, in a patient with suspected lung malignancy findings are  worrisome for metastatic intraparotid adenopathy. Correlation with PET/CT may be  helpful as clinically appropriate. 2.  Chronic right maxillary sinusitis    CT chest with contrast:IMPRESSION  1. Large mass centered at the interface between the right lower lobe and the  mediastinum, as detailed above, highly suspicious for neoplasm with leading  differential consideration of lung cancer. 2. The mass abuts several mediastinal structures, including the esophagus and  the heart with direct invasion difficult to exclude on this exam.  3. T8 compression fracture of unknown chronicity. The mass also abuts the  vertebral body making a pathologic fracture possible. 4. Erosion of the anterior right lateral aspect of the T9 vertebral body which  is suspicious for direct invasion by the mass. 5. Enlarged right hilar lymph node, suspicious for disease involvement. 6. Indeterminate 4 mm right middle lobe solid nodule.     Reviewed most current lab test results and cultures  YES  Reviewed most current radiology test results   YES  Review and summation of old records today    NO  Reviewed patient's current orders and MAR    YES  PMH/SH reviewed - no change compared to H&P    Assessment/plan:    Lung mass/pneumonia- rt lung mass/hilar adenopathy with possible t8 vertebral erosion, right parotid mass.  -mentioned symptomatology was found to have a significant lung mass between the right lower lobe as well as mediastinotomy concerning for malignancy given unexpected weight loss as well as possible con commitment infection with evidence of leukocytosis, s/p bronchoscopy/biopsy which was unrevealing. Continue Zosyn for antibiotic coverage  Pulmonology consult appreciated  Oncology consult appreciated  S/p eus/bx mediastinal mass 4/27-f/u bx  Prognosis is poor, not a candidate for chemo or immunotherapy based on her poor general condition and performance status. After prolonged discussion with oncology/Dr. Orlin Solitario ED in the presence of family members, prognosis expressed to them as very poor and the fact that she is not a candidate for chemo or immunotherapy, palliative care/hospice is recommended. Daughter now is in agreement and will again get hospice involved.     Anemia  patient presents with above-mentioned symptomatology and was being worked up for anemia as an outpatient with concerns for possible GI bleeding, patient was scheduled to have an outpatient endoscopy, had it today which showed extrinsic compression of esophagus- no bleeding mentioned. Maintain active type and screen  Monitor h&h, relatively stable today 8.5  Hematology consult appreciated  Gastroenterology consult appreciated     Hypokalemiareplete and follow     Oral thrushof note patient has evidence of oral thrush upon physical examination  continue nystatin 4 times daily  Continue to monitor    Ams:  Encephalopathic- flexeril? Mets? Start with holding flexeril.  She does appear lucid though quite lethargic    Urinary Tract infection -   UA consistent with UTI, pt does not meet SIRS/sepsis criteria at this time  Follow up blood cultures- ngd6  Follow up urine cultures-ngd2  No ucs. Continue Zosyn for abx coverage day 8  Continue to monitor     ProphylaxisSCDs, currently holding off on Lovenox given concern for possible GI bleed  FENcardiac diet, cardiac diet, replete potassium and magnesium  Full code,  Disposition - prognosis poor, they have agreed for hospice eval.   Care Plan discussed with:    Comments   Patient X    Family  X    RN X    Care Manager x    Consultant  x                     x Multidiciplinary team rounds were held today with , nursing, pharmacist and clinical coordinator. Patient's plan of care was discussed; medications were reviewed and discharge planning was addressed.      ________________________________________________________________________  Total NON critical care TIME:  35  Minutes      Comments   >50% of visit spent in counseling and coordination of care X    ________________________________________________________________________  Alice Christy MD

## 2021-04-28 NOTE — PROGRESS NOTES
Hematology and Oncology Progress Note Patient: Rosa Elena Neal MRN: [id number]  SSN: [id number] YOB: 1945  Age: 76 y.o. Sex: female Admit Date: 4/20/2021 LOS: 8 days Chief Complaint: Patient has dyspnea Subjective:  
Patient weak. Pt with back pain and constipation. Objective:  
 
Vitals:  
 04/27/21 1951 04/28/21 0006 04/28/21 7533 04/28/21 2173 BP: (!) 149/77 (!) 145/81 130/72 (!) 151/79 Pulse: (!) 101 100 83 Resp: 16 16 16 Temp: 97.7 °F (36.5 °C) 97.4 °F (36.3 °C) 97.8 °F (36.6 °C) SpO2: 94% 94% 93% 93% Weight:      
Height:      
  
Physical Exam: 
Constitutional: Elderly white female looks chronically ill. Not in any acute distress or pain. Eyes: Sclerae anicteric. Conjunctivae shows pallor. ENMT: Has mask on. Patient has bitemporal wasting. She also has slight puffiness around her eyes. Neck: No adenopathy. Respiratory: Lungs are clear bilaterally. Cardiovascular: Normal sinus rhythm. Extremities: She does have bilateral lower extremity swelling. Skin: No petechiae; no skin rash. Neurologic: Alert/oriented x 3. Lab/Data Review: 
 
Recent Results (from the past 24 hour(s)) GLUCOSE, POC Collection Time: 04/27/21  9:04 PM  
Result Value Ref Range Glucose (POC) 127 (H) 65 - 100 mg/dL Performed by [facility] METABOLIC PANEL, BASIC Collection Time: 04/28/21  7:28 AM  
Result Value Ref Range Sodium 146 (H) 136 - 145 mmol/L Potassium 3.4 (L) 3.5 - 5.1 mmol/L Chloride 113 (H) 97 - 108 mmol/L  
 CO2 28 21 - 32 mmol/L Anion gap 5 5 - 15 mmol/L Glucose 138 (H) 65 - 100 mg/dL BUN 23 (H) 6 - 20 mg/dL Creatinine 0.78 0.55 - 1.02 mg/dL BUN/Creatinine ratio 29 (H) 12 - 20 GFR est AA >60 >60 ml/min/1.73m2 GFR est non-AA >60 >60 ml/min/1.73m2 Calcium 12.5 (H) 8.5 - 10.1 mg/dL CBC WITH AUTOMATED DIFF Collection Time: 04/28/21  7:28 AM  
Result Value Ref Range WBC 17.5 (H) 3.6 - 11.0 K/uL RBC 3.42 (L) 3.80 - 5.20 M/uL HGB 8.5 (L) 11.5 - 16.0 g/dL HCT 28.4 (L) 35.0 - 47.0 % MCV 83.0 80.0 - 99.0 FL  
 MCH 24.9 (L) 26.0 - 34.0 PG  
 MCHC 29.9 (L) 30.0 - 36.5 g/dL  
 RDW 18.6 (H) 11.5 - 14.5 % PLATELET 515 (H) 994 - 400 K/uL MPV 8.6 (L) 8.9 - 12.9 FL  
 NEUTROPHILS 92 (H) 32 - 75 % LYMPHOCYTES 3 (L) 12 - 49 % MONOCYTES 5 5 - 13 % EOSINOPHILS 0 0 - 7 % BASOPHILS 0 0 - 1 % IMMATURE GRANULOCYTES 0 0.0 - 0.5 % ABS. NEUTROPHILS 16.0 (H) 1.8 - 8.0 K/UL  
 ABS. LYMPHOCYTES 0.6 (L) 0.8 - 3.5 K/UL  
 ABS. MONOCYTES 0.9 0.0 - 1.0 K/UL  
 ABS. EOSINOPHILS 0.0 0.0 - 0.4 K/UL  
 ABS. BASOPHILS 0.0 0.0 - 0.1 K/UL  
 ABS. IMM. GRANS. 0.1 (H) 0.00 - 0.04 K/UL  
 DF AUTOMATED Assessment and plan:  
 
51-year-old white female who has shortness of breath dysphagia and weight loss. Patient had CT of chest which shows right lung mass with hilar adenopathy and possible T8 vertebral erosion,right parotid mass. Patient's bronchoscopy specimen was nondiagnostic. S/p peg tube placement and EUS. FNS initial report d/w pathologist suspicious for squamous cell ca. Final path pending. Condition,prognosis and treatment discussed with pt,pt daughter Ms. Baylee Dave and pt . Spent more than 30 minutes. Dr. Ada Griggs also in room with pt family. Prognosis very poor. Due to pt general condition and performance status pt not candidate for chemotherapy or immunotherapy. Recommend palliative care and hospice. Anemia:monitor. Leukocytosis:White count improving Thrombocytosis:possible reactive. Constipation:due to narcotics. Continue miralax. Fleets enema today. D/w pt nurse. Signed By: Jhon Buerger, MD   
 April 28, 2021

## 2021-04-28 NOTE — PROGRESS NOTES
Pulmonary Progress Note      NAME: Sandra Bull   :  1945  MRM:  [id number]    Date/Time: 2021  5:38 PM         Subjective:     Patient evaluated in her room. Daughter was also present at bedside. Patient ultrasound-guided endoscopy needle aspiration biopsy done for mediastinal mass, PEG tube was also placed. Remains on room air. Past Medical History reviewed and unchanged from Admission History and Physical        Objective:     Physical Exam     Vitals:      Last 24hrs VS reviewed since prior progress note. Most recent are:    Visit Vitals  BP (!) 151/79   Pulse 83   Temp 97.8 °F (36.6 °C)   Resp 16   Ht 5' 8\" (1.727 m)   Wt 37.2 kg (81 lb 15.8 oz)   SpO2 93%   Breastfeeding No   BMI 12.47 kg/m²     SpO2 Readings from Last 6 Encounters:   21 93%   21 96%   21 97%    O2 Flow Rate (L/min): 4 l/min       Intake/Output Summary (Last 24 hours) at 2021 1316  Last data filed at 2021 0020  Gross per 24 hour   Intake 60 ml   Output    Net 60 ml      GENERAL:  The patient is an elderly, frail  female who does not appear to be in any distress. Saturation is 97% on room air. HEENT:  Showed pupils are equal and reactive to light. Pallor is noted. No icterus. Nasal passages are patent. Oropharynx is without obvious thrush. NECK:  Supple. Trachea is central.  No JVD or lymphadenopathy. The patient stated that she had a lump on the right side, which has grown away. She is not using any accessory muscles of respiration. However, she has had significant weight loss, with prominent supraclavicular fossa. CHEST:  Symmetrical with equal and fair air entry bilaterally. Breath sounds are diminished in general.    No wheezing or rhonchi today. No chest wall tenderness. HEART:  Rhythm is regular without any loud murmur or gallop. ABDOMEN:  Soft and benign. Bowel sounds are audible.     Has PEG tube placed EXTREMITIES:  Did not show any cyanosis or clubbing. No pitting edema. Pulses are palpable. NEUROLOGIC:  Grossly intact. SKIN:  Warm and dry. CT HEAD WO CONT   Final Result   1. No acute ischemia or intracranial mass. 2. Stable mass associated with the posterior aspect of the right parotid gland   posterior to the right mandible. Recommend follow-up. XR ABD (AP AND ERECT OR DECUB)   Final Result   Findings/impression:      Nonspecific gaseous distention of the stomach. No definite pneumoperitoneum. Moderate stool in the rectum. Right perihilar mass lesion partially visualized. See recently performed chest   CT. No acute osseous abnormality identified. XR CHEST PORT   Final Result      XR FLUOROSCOPY UNDER 60 MINUTES   Final Result      CT NECK SOFT TISSUE W CONT   Final Result   1.  2.3 x 1.7 x 3.6 cm enhancing mass in the posterior right parotid gland. Smaller soft tissue nodules are present in the inferior left parotid gland with   an additional nodule suspected in the right parotid gland anterior to the   dominant mass. Overall, in a patient with suspected lung malignancy findings are   worrisome for metastatic intraparotid adenopathy. Correlation with PET/CT may be   helpful as clinically appropriate. 2.  Chronic right maxillary sinusitis. CT CHEST W CONT   Final Result   1. Large mass centered at the interface between the right lower lobe and the   mediastinum, as detailed above, highly suspicious for neoplasm with leading   differential consideration of lung cancer. 2. The mass abuts several mediastinal structures, including the esophagus and   the heart with direct invasion difficult to exclude on this exam.   3. T8 compression fracture of unknown chronicity. The mass also abuts the   vertebral body making a pathologic fracture possible. 4. Erosion of the anterior right lateral aspect of the T9 vertebral body which   is suspicious for direct invasion by the mass.    5. Enlarged right hilar lymph node, suspicious for disease involvement. 6. Indeterminate 4 mm right middle lobe solid nodule. XR CHEST PORT   Final Result      1. Right hilar fullness and increased density with suspicion for potential right   hilar mass. Recommend further evaluation with contrast enhanced chest CT. 2. See above for additional comments.       The above was discussed with and acknowledged by Dr. Chriss Bernal by telephone   on  4/20/2021 4:09 PM.          Lab Data Reviewed: (see below)      Medications:  Current Facility-Administered Medications   Medication Dose Route Frequency    EPINEPHrine (ADRENALIN) 0.1 mg/mL syringe    PRN    lidocaine (XYLOCAINE) 10 mg/mL (1 %) injection    PRN    mineral oil (topical)    PRN    albuterol-ipratropium (DUO-NEB) 2.5 MG-0.5 MG/3 ML  3 mL Nebulization Q4H PRN    melatonin tablet 6 mg  6 mg Oral QHS    nystatin (MYCOSTATIN) 100,000 unit/mL oral suspension 500,000 Units  500,000 Units Oral QID    multivitamin, tx-iron-ca-min (THERA-M w/ IRON) tablet 1 Tab  1 Tab Oral DAILY    lidocaine (XYLOCAINE) 4 % cream   Topical BID    [Held by provider] cyclobenzaprine (FLEXERIL) tablet 10 mg  10 mg Oral TID    sodium chloride (NS) flush 5-40 mL  5-40 mL IntraVENous Q8H    sodium chloride (NS) flush 5-40 mL  5-40 mL IntraVENous PRN    acetaminophen (TYLENOL) tablet 650 mg  650 mg Oral Q6H PRN    Or    acetaminophen (TYLENOL) suppository 650 mg  650 mg Rectal Q6H PRN    polyethylene glycol (MIRALAX) packet 17 g  17 g Oral DAILY PRN    promethazine (PHENERGAN) tablet 12.5 mg  12.5 mg Oral Q6H PRN    Or    ondansetron (ZOFRAN) injection 4 mg  4 mg IntraVENous Q6H PRN    piperacillin-tazobactam (ZOSYN) 3.375 g in 0.9% sodium chloride (MBP/ADV) 100 mL MBP  3.375 g IntraVENous Q8H     Facility-Administered Medications Ordered in Other Encounters   Medication Dose Route Frequency    ePHEDrine in NS (PF) (MISTOLE) 10 mg/mL in NS syringe   IntraVENous PRN    propofoL (DIPRIVAN) 10 mg/mL injection   IntraVENous PRN    rocuronium injection   IntraVENous PRN    lactated Ringers infusion   IntraVENous CONTINUOUS    fentaNYL citrate (PF) injection   IntraVENous PRN    dexamethasone (DECADRON) 4 mg/mL injection   IntraVENous PRN    ondansetron (ZOFRAN) injection   IntraVENous PRN    neostigmine (PROSTIGMINE) injection   IntraVENous PRN    glycopyrrolate (ROBINUL) injection   IntraVENous PRN       ______________________________________________________________________      Lab Review:     Recent Labs     04/28/21 0728 04/27/21 0737 04/26/21  0659   WBC 17.5* 20.5* 16.0*   HGB 8.5* 8.7* 8.4*   HCT 28.4* 29.0* 27.7*   * 576* 534*     Recent Labs     04/28/21 0728 04/27/21 0737 04/26/21 2133 04/26/21  0659 04/26/21  0659   * 143 140  --  141   K 3.4* 3.2* 3.7   < > 2.6*   * 108 105  --  104   CO2 28 29 28  --  31   * 104* 74  --  88   BUN 23* 19 15  --  11   CREA 0.78 0.55 0.57  --  0.48*   CA 12.5* 12.2* 12.0*  --  11.1*   MG  --   --   --   --  2.1   ALB  --  2.4*  --   --   --    ALT  --  14  --   --   --     < > = values in this interval not displayed. No components found for: GLPOC  No results for input(s): PH, PCO2, PO2, HCO3, FIO2 in the last 72 hours. No results for input(s): INR, INREXT, INREXT, INREXT in the last 72 hours. Other pertinent lab:   CT of the chest done on 04/20/2021 was personally reviewed. She has a large right lower lobe medially located heterogeneous mass, which measures 8.1 cm in maximum dimension. There is also T8 compression fracture. Increased right hilar lymphadenopathy is described. Influenza A and B is negative. COVID-19 rapid test is negative. Assessment & Plan:      1. A 68-year-old  female with a history of extensive and ongoing tobacco use presenting with weight loss and back pain.   She has a large mass in the right lower lobe located medially adjacent to the mediastinum and probably involving the mediastinum. It is measuring 8.1 cm in size. It is heterogeneous. There is right hilar lymphadenopathy. CT images were personally reviewed with the radiologist.  Most likely, it appears to be lung cancer. There also appears to be T8 compression fracture causing significant back pain. The patient underwent bronchoscopy on 4/22. There was extrinsic compression noted in the distal bronchus intermedius. However I was able to pass the bronchoscope in the right middle lobe and the right lower lobe segments. Biopsies were taken from this area. 2 transbronchial biopsies were also taken. Cytology brushings and washings were obtained. Preliminary report shows no evidence of malignancy. Patient underwent endoscopic ultrasound-guided biopsy of mediastinal mass today, she also had PEG tube placed for dysphagia. Without any pain. Awaiting results of the biopsy. 2.  Chronic obstructive pulmonary disease. There are significant emphysematous changes on the CT scan. Continue with nebulized bronchodilator treatments at this time. She is comfortable on room air. 3.  Back pain. This is due to compression fracture which may be pathological fractures. Advocate judicious pain control. Hematology/Oncology consulted. 4.  Anemia. 5.  For deep vein thrombosis prophylaxis, on sequential compression devices to the lower extremities. Patient has elevated WBC count and has been on IV Zosyn. Thank you for allowing me to participate in the care of this patient. More than 30 minutes spent with patient care.     Maurice Yao MD  [facility]

## 2021-04-28 NOTE — ROUTINE PROCESS
Bedside and Verbal shift change report given to Luis F Stephens RN  (oncoming nurse) by Janneth Vences (offgoing nurse). Report included the following information SBAR, MAR and Recent Results.

## 2021-04-29 PROBLEM — C34.90 LUNG CANCER (HCC): Status: ACTIVE | Noted: 2021-01-01

## 2021-04-29 NOTE — PROGRESS NOTES
[facility] Physician will evaluate patient at bedside today to determine LOC for Hospice services. CM awaiting final determination.

## 2021-04-29 NOTE — ROUTINE PROCESS
Bedside and Verbal shift change report given to Bruno Brar RN (oncoming nurse) by Tommy Yost (offgoing nurse). Report included the following information SBAR, Kardex, MAR, Accordion, Recent Results and Quality Measures.

## 2021-04-29 NOTE — ROUTINE PROCESS
Bedside and Verbal shift change report given to Marielle Darling (oncoming nurse) by Nicole Dooley (offgoing nurse). Report included the following information SBAR, Kardex, Procedure Summary, Intake/Output, MAR, Accordion and Recent Results.

## 2021-04-29 NOTE — HOSPICE
[facility]  [phone]    Inpatient Nursing Admission   Patient Name: Philippe Cordero  YOB: 1945  Age: 76 y.o. Date of Hospice Admission: 4/29/2021  Hospice Attending Elected by Patient: Re Perez MD  Primary Care Physician: Lorraine Kim DO  Admitting RN: Keshav Duran, RN  : SHANAE Negro     Level of Care [facility]  Facility of Care: [facility]  Patient Room: 422/01     HOSPICE SUMMARY   ER Visits/ Hospitalizations in past year: 1  Hospice Diagnosis: Lung cancer [facility]) [C34.90]  Onset Date of Hospice Diagnosis: 4/29/21  Summary of Disease Progression Leading to Hospice Diagnosis: 49-year-old female with past medical history of anemia, poor follow-up with physicians presents to [facility] with generalized weakness as well as unexplained weight loss. Patient states over the past few months she has been noticing gradual onset persistent progressive generalized weakness as well as unexplained weight loss following which she presented to the ED. Patient denies any fevers chills nausea vomiting lightheadedness dizziness dyspnea orthopnea paroxysmal nocturnal dyspnea chest pain palpitations headache focal weakness loss sensation auditory or visual symptoms abdominal stool urinary complaints or any other associated symptoms. CT neck soft tissue:IMPRESSION  1.  2.3 x 1.7 x 3.6 cm enhancing mass in the posterior right parotid gland. Smaller soft tissue nodules are present in the inferior left parotid gland with  an additional nodule suspected in the right parotid gland anterior to the  dominant mass. Overall, in a patient with suspected lung malignancy findings are  worrisome for metastatic intraparotid adenopathy. Correlation with PET/CT may be  helpful as clinically appropriate.   2.  Chronic right maxillary sinusitis     CT chest with contrast:IMPRESSION  1. Large mass centered at the interface between the right lower lobe and the  mediastinum, as detailed above, highly suspicious for neoplasm with leading  differential consideration of lung cancer. 2. The mass abuts several mediastinal structures, including the esophagus and  the heart with direct invasion difficult to exclude on this exam.  3. T8 compression fracture of unknown chronicity. The mass also abuts the  vertebral body making a pathologic fracture possible. 4. Erosion of the anterior right lateral aspect of the T9 vertebral body which  is suspicious for direct invasion by the mass. 5. Enlarged right hilar lymph node, suspicious for disease involvement. 6. Indeterminate 4 mm right middle lobe solid nodule. Co-Morbidities:   Patient Active Problem List   Diagnosis Code    Thrombocytosis (HCC) D47.3    Chronic right shoulder pain M25.511, G89.29    Oropharyngeal dysphagia R13.12    Chronic right-sided thoracic back pain M54.6, G89.29    Lung mass R91.8    Pneumonia J18.9    Lung cancer (HCC) C34.90     Diagnoses RELATED to the terminal prognosis: Dysphagia, mets to spine, pneumonia, pain, sepsis, constipation  Other Diagnoses:     Rationale for a prognosis of life expectancy of 6 months or less if the disease follows its normal course (Disease Specific History):     Yuliana Crawford is a 76 y.o. who was admitted to [facility]. The patient's principle diagnosis of lung cancer has resulted in intractable pain, dyspnea, restlessness and agitation. .  Functionally, the patient's Palliative Performance Scale has declined over a period of several weeks and is estimated at 20%. Objective information that support this patients limited prognosis includes: 30 # wt loss, dysphagia, alb 2.2. The patient and family have chosen comfort measures with the support of Hospice.     Patient meets for GIP LOC as evidenced by intractable pain, dyspnea, restlessness/agitation    Prognosis estimated based on 04/29/21 clinical assessment is:   [] Few to Many Hours  [] Hours to Days [] Few to Many Days   [x] Days to Weeks   [] Few to Many Weeks   [] Weeks to Months   [] Few to Many Months    ASSESSMENT    Patient self-reports:  []  Yes    [x] No    SYMPTOMS: pain, dyspnea, restlessness/agitation    SIGNS/PHYSICAL FINDINGS: moaning, grimacing, increased work of breathing  KARNOFSKY: 20      Learning Assessment:  Patient  Is patient willing/able to learn? No  What is the highest level of education completed? Learning preference (written material, demonstration, visual)? Learning barriers (ESOL, [place], poor vision)? Caregiver  Is caregiver willing to learn care for patient? Yes  What is the highest level of education completed? Learning preference (written material, demonstration, visual)? Learning barriers (ESOL, [place], poor vision)? CLINICAL INFORMATION     Wt Readings from Last 3 Encounters:   04/23/21 37.2 kg (81 lb 15.8 oz)   04/01/21 43.1 kg (95 lb)   03/17/21 43.5 kg (96 lb)     Ht Readings from Last 3 Encounters:   04/27/21 5' 8\" (1.727 m)   04/01/21 5' 8\" (1.727 m)   03/17/21 5' 8\" (1.727 m)       LAB VALUES    Results for Stacia Merino (MRN [id number]) as of 4/29/2021 17:58   Ref.  Range 4/29/2021 12:05   WBC Latest Ref Range: 3.6 - 11.0 K/uL 17.0 (H)   RBC Latest Ref Range: 3.80 - 5.20 M/uL 3.68 (L)   HGB Latest Ref Range: 11.5 - 16.0 g/dL 9.1 (L)   HCT Latest Ref Range: 35.0 - 47.0 % 31.0 (L)   MCV Latest Ref Range: 80.0 - 99.0 FL 84.2   MCH Latest Ref Range: 26.0 - 34.0 PG 24.7 (L)   MCHC Latest Ref Range: 30.0 - 36.5 g/dL 29.4 (L)   RDW Latest Ref Range: 11.5 - 14.5 % 18.8 (H)   PLATELET Latest Ref Range: 150 - 400 K/uL 437 (H)   MPV Latest Ref Range: 8.9 - 12.9 FL 8.8 (L)   NEUTROPHILS Latest Ref Range: 32 - 75 % 92 (H)   LYMPHOCYTES Latest Ref Range: 12 - 49 % 3 (L)   MONOCYTES Latest Ref Range: 5 - 13 % 5   EOSINOPHILS Latest Ref Range: 0 - 7 % 0   BASOPHILS Latest Ref Range: 0 - 1 % 0   IMMATURE GRANULOCYTES Latest Ref Range: 0.0 - 0.5 % 0   DF Latest Units:   AUTOMATED ABS. NEUTROPHILS Latest Ref Range: 1.8 - 8.0 K/UL 15.6 (H)   ABS. IMM. GRANS. Latest Ref Range: 0.00 - 0.04 K/UL 0.1 (H)   ABS. LYMPHOCYTES Latest Ref Range: 0.8 - 3.5 K/UL 0.5 (L)   ABS. MONOCYTES Latest Ref Range: 0.0 - 1.0 K/UL 0.9   ABS. EOSINOPHILS Latest Ref Range: 0.0 - 0.4 K/UL 0.0   ABS. BASOPHILS Latest Ref Range: 0.0 - 0.1 K/UL 0.0   Sodium Latest Ref Range: 136 - 145 mmol/L 149 (H)   Potassium Latest Ref Range: 3.5 - 5.1 mmol/L 3.2 (L)   Chloride Latest Ref Range: 97 - 108 mmol/L 116 (H)   CO2 Latest Ref Range: 21 - 32 mmol/L 31   Anion gap Latest Ref Range: 5 - 15 mmol/L 2 (L)   Glucose Latest Ref Range: 65 - 100 mg/dL 119 (H)   BUN Latest Ref Range: 6 - 20 mg/dL 35 (H)   Creatinine Latest Ref Range: 0.55 - 1.02 mg/dL 0.96   BUN/Creatinine ratio Latest Ref Range: 12 - 20   36 (H)   Calcium Latest Ref Range: 8.5 - 10.1 mg/dL 12.7 (H)   GFR est non-AA Latest Ref Range: >60 ml/min/1.73m2 57 (L)   GFR est AA Latest Ref Range: >60 ml/min/1.73m2 >60   Bilirubin, total Latest Ref Range: 0.2 - 1.0 mg/dL 0.3   Protein, total Latest Ref Range: 6.4 - 8.2 g/dL 6.3 (L)   Albumin Latest Ref Range: 3.5 - 5.0 g/dL 2.2 (L)   Globulin Latest Ref Range: 2.0 - 4.0 g/dL 4.1 (H)   A-G Ratio Latest Ref Range: 1.1 - 2.2   0.5 (L)   ALT Latest Ref Range: 12 - 78 U/L 13   AST Latest Ref Range: 15 - 37 U/L 10 (L)   Alk. phosphatase Latest Ref Range: 45 - 117 U/L 107     Currently this patient has:  [] Supplemental O2 [x] Peripheral IV  [] PICC    [] PORT   [] Simms Catheter [] NG Tube   [] PEG Tube [] Ostomy    [] AICD: Has ICD been deactivated? [] Yes [] No:______    PLAN   1. Admit to [facility] level care as she needs frequent nursing assessment, IV medication adjustment,           not safe to transition to sublingual/oral medication given nor is patient safe to transition home              given the poor prognosis   2. Hydromorphone PCA (15mg/30ml) @ 0.5mg/hr with prn available  3. Lorazepam 0.5 mg IV q 15 min prn  4.   Robinul 0.2 mg IV q 4 hours prn  5. Other comfort meds for treatment of n/v, fever, etc as needed  6. [place] and SW support    Hospice Team Frequency Orders:  Skilled Nurse -  Daily x 7 days with 5 PRN visits for symptom control. MSW  1 visit for initial assessment/evaluation for family support and need for volunteer services. Lazaro Mon  1 visit for initial assessment/evaluation for spiritual support. ADVANCE CARE PLANNING (Complete in ACP Flow Sheet)   Code Status: DNR  Durable DNR: []  Yes  [x]  No  Code Status Discussed/Confirmed: Yes  Preference for other life sustaining treatment discussed/confirmed: Yes  Hospitalization Preference:  Family would like patient to receive EOL care in hospital    [employer]: [address] [facility] [address]: Pending    DISCHARGE PLANNING     1. Discharge Plan: Discharge to home or other setting should condition stabilize  2. Patient/Family teaching: Goals of care/EOL process  3. Response to patient/family teaching: Acknowledged understanding    SOCIAL/EMOTIONAL/SPIRITUAL NEEDS     Spiritual Issues Identified: Hospital and hospice chaplain to support patient and family    Psych/ Social/ Emotional Issues Identified: MSW present during admission and obtained consents. Spouse and dtr also present. Patient is , two children alive and well. Both daughter and son live in vicinity. Daughter Dara Jarrett) is employed at [employer] in [address]. Elissa Barber contacted, discharge to hospice order received  Dr. José Luis Dominique contacted, agrees to serve as attending provider for hospice and provided verbal certification of terminal illness with life expectancy of 6 months or less. Orders for hospice admission, medications and plan of treatment received. Medication reconciliation completed.   MEDS: See medication list below  DME: Per hospital  Supplies: Per hospital  IDT communication to include MD, SN, SW, CH and support team    ALLERGIES AND MEDICATIONS     Allergies: No Known Allergies  Current Facility-Administered Medications   Medication Dose Route Frequency    LORazepam (ATIVAN) injection 0.5 mg  0.5 mg IntraVENous Q15MIN PRN    ketorolac (TORADOL) injection 30 mg  30 mg IntraVENous Q8H PRN    glycopyrrolate (ROBINUL) injection 0.2 mg  0.2 mg IntraVENous Q4H PRN    bisacodyL (DULCOLAX) suppository 10 mg  10 mg Rectal DAILY PRN    HYDROmorphone (DILAUDID) syringe 0.5 mg  0.5 mg IntraVENous Q15MIN PRN    HYDROmorphone 15 mg/30 ml (DILAUDID) PCA   IntraVENous CONTINUOUS    sodium chloride (NS) flush 5-10 mL  5-10 mL IntraVENous Q8H

## 2021-04-29 NOTE — PROGRESS NOTES
Pulmonary Progress Note      NAME: Marky Leigh   :  1945  MRM:  [id number]    Date/Time: 2021  5:38 PM         Subjective:     Patient evaluated in her room. Daughter was also present at bedside. Patient ultrasound-guided endoscopy needle aspiration biopsy done for mediastinal mass, PEG tube was also placed. Path results still pending. Remains on room air. Past Medical History reviewed and unchanged from Admission History and Physical        Objective:     Physical Exam     Vitals:      Last 24hrs VS reviewed since prior progress note. Most recent are:    Visit Vitals  BP (!) 149/77 (BP 1 Location: Right upper arm, BP Patient Position: Lying right side)   Pulse 94   Temp 97.8 °F (36.6 °C)   Resp 16   Ht 5' 8\" (1.727 m)   Wt 37.2 kg (81 lb 15.8 oz)   SpO2 94%   Breastfeeding No   BMI 12.47 kg/m²     SpO2 Readings from Last 6 Encounters:   21 94%   21 96%   21 97%    O2 Flow Rate (L/min): 4 l/min       Intake/Output Summary (Last 24 hours) at 2021 1243  Last data filed at 2021 2100  Gross per 24 hour   Intake 50 ml   Output    Net 50 ml      GENERAL:  The patient is an elderly, frail  female who does not appear to be in any distress. Saturation is 97% on room air. HEENT:  Showed pupils are equal and reactive to light. Pallor is noted. No icterus. Nasal passages are patent. Oropharynx is without obvious thrush. NECK:  Supple. Trachea is central.  No JVD or lymphadenopathy. The patient stated that she had a lump on the right side, which has grown away. She is not using any accessory muscles of respiration. However, she has had significant weight loss, with prominent supraclavicular fossa. CHEST:  Symmetrical with equal and fair air entry bilaterally. Breath sounds are diminished in general.    No wheezing or rhonchi today. No chest wall tenderness. HEART:  Rhythm is regular without any loud murmur or gallop. ABDOMEN:  Soft and benign. Bowel sounds are audible. Has PEG tube placed EXTREMITIES:  Did not show any cyanosis or clubbing. No pitting edema. Pulses are palpable. NEUROLOGIC:  Grossly intact. SKIN:  Warm and dry. CT HEAD WO CONT   Final Result   1. No acute ischemia or intracranial mass. 2. Stable mass associated with the posterior aspect of the right parotid gland   posterior to the right mandible. Recommend follow-up. XR ABD (AP AND ERECT OR DECUB)   Final Result   Findings/impression:      Nonspecific gaseous distention of the stomach. No definite pneumoperitoneum. Moderate stool in the rectum. Right perihilar mass lesion partially visualized. See recently performed chest   CT. No acute osseous abnormality identified. XR CHEST PORT   Final Result      XR FLUOROSCOPY UNDER 60 MINUTES   Final Result      CT NECK SOFT TISSUE W CONT   Final Result   1.  2.3 x 1.7 x 3.6 cm enhancing mass in the posterior right parotid gland. Smaller soft tissue nodules are present in the inferior left parotid gland with   an additional nodule suspected in the right parotid gland anterior to the   dominant mass. Overall, in a patient with suspected lung malignancy findings are   worrisome for metastatic intraparotid adenopathy. Correlation with PET/CT may be   helpful as clinically appropriate. 2.  Chronic right maxillary sinusitis. CT CHEST W CONT   Final Result   1. Large mass centered at the interface between the right lower lobe and the   mediastinum, as detailed above, highly suspicious for neoplasm with leading   differential consideration of lung cancer. 2. The mass abuts several mediastinal structures, including the esophagus and   the heart with direct invasion difficult to exclude on this exam.   3. T8 compression fracture of unknown chronicity. The mass also abuts the   vertebral body making a pathologic fracture possible.    4. Erosion of the anterior right lateral aspect of the T9 vertebral body which   is suspicious for direct invasion by the mass. 5. Enlarged right hilar lymph node, suspicious for disease involvement. 6. Indeterminate 4 mm right middle lobe solid nodule. XR CHEST PORT   Final Result      1. Right hilar fullness and increased density with suspicion for potential right   hilar mass. Recommend further evaluation with contrast enhanced chest CT. 2. See above for additional comments.       The above was discussed with and acknowledged by Dr. Dante Tucker by telephone   on  4/20/2021 4:09 PM.          Lab Data Reviewed: (see below)      Medications:  Current Facility-Administered Medications   Medication Dose Route Frequency    HYDROmorphone (DILAUDID) syringe 0.5 mg  0.5 mg IntraVENous Q3H PRN    polyethylene glycol (MIRALAX) packet 17 g  17 g Oral DAILY    EPINEPHrine (ADRENALIN) 0.1 mg/mL syringe    PRN    lidocaine (XYLOCAINE) 10 mg/mL (1 %) injection    PRN    mineral oil (topical)    PRN    albuterol-ipratropium (DUO-NEB) 2.5 MG-0.5 MG/3 ML  3 mL Nebulization Q4H PRN    melatonin tablet 6 mg  6 mg Oral QHS    nystatin (MYCOSTATIN) 100,000 unit/mL oral suspension 500,000 Units  500,000 Units Oral QID    multivitamin, tx-iron-ca-min (THERA-M w/ IRON) tablet 1 Tab  1 Tab Oral DAILY    lidocaine (XYLOCAINE) 4 % cream   Topical BID    [Held by provider] cyclobenzaprine (FLEXERIL) tablet 10 mg  10 mg Oral TID    sodium chloride (NS) flush 5-40 mL  5-40 mL IntraVENous Q8H    sodium chloride (NS) flush 5-40 mL  5-40 mL IntraVENous PRN    acetaminophen (TYLENOL) tablet 650 mg  650 mg Oral Q6H PRN    Or    acetaminophen (TYLENOL) suppository 650 mg  650 mg Rectal Q6H PRN    promethazine (PHENERGAN) tablet 12.5 mg  12.5 mg Oral Q6H PRN    Or    ondansetron (ZOFRAN) injection 4 mg  4 mg IntraVENous Q6H PRN    piperacillin-tazobactam (ZOSYN) 3.375 g in 0.9% sodium chloride (MBP/ADV) 100 mL MBP  3.375 g IntraVENous Q8H     Facility-Administered Medications Ordered in Other Encounters   Medication Dose Route Frequency    ePHEDrine in NS (PF) (MISTOLE) 10 mg/mL in NS syringe   IntraVENous PRN    propofoL (DIPRIVAN) 10 mg/mL injection   IntraVENous PRN    rocuronium injection   IntraVENous PRN    lactated Ringers infusion   IntraVENous CONTINUOUS    fentaNYL citrate (PF) injection   IntraVENous PRN    dexamethasone (DECADRON) 4 mg/mL injection   IntraVENous PRN    ondansetron (ZOFRAN) injection   IntraVENous PRN    neostigmine (PROSTIGMINE) injection   IntraVENous PRN    glycopyrrolate (ROBINUL) injection   IntraVENous PRN       ______________________________________________________________________      Lab Review:     Recent Labs     04/28/21 0728 04/27/21 0737   WBC 17.5* 20.5*   HGB 8.5* 8.7*   HCT 28.4* 29.0*   * 576*     Recent Labs     04/28/21 0728 04/27/21  0737 04/26/21  2133   * 143 140   K 3.4* 3.2* 3.7   * 108 105   CO2 28 29 28   * 104* 74   BUN 23* 19 15   CREA 0.78 0.55 0.57   CA 12.5* 12.2* 12.0*   ALB  --  2.4*  --    ALT  --  14  --      No components found for: GLPOC  No results for input(s): PH, PCO2, PO2, HCO3, FIO2 in the last 72 hours. No results for input(s): INR, INREXT, INREXT, INREXT in the last 72 hours. Other pertinent lab:   CT of the chest done on 04/20/2021 was personally reviewed. She has a large right lower lobe medially located heterogeneous mass, which measures 8.1 cm in maximum dimension. There is also T8 compression fracture. Increased right hilar lymphadenopathy is described. Influenza A and B is negative. COVID-19 rapid test is negative. Assessment & Plan:      1. A 80-year-old  female with a history of extensive and ongoing tobacco use presenting with weight loss and back pain. She has a large mass in the right lower lobe located medially adjacent to the mediastinum and probably involving the mediastinum. It is measuring 8.1 cm in size. It is heterogeneous.   There is right hilar lymphadenopathy. CT images were personally reviewed with the radiologist.  Most likely, it appears to be lung cancer. There also appears to be T8 compression fracture causing significant back pain. The patient underwent bronchoscopy on 4/22. There was extrinsic compression noted in the distal bronchus intermedius. However I was able to pass the bronchoscope in the right middle lobe and the right lower lobe segments. Biopsies were taken from this area. 2 transbronchial biopsies were also taken. Cytology brushings and washings were obtained. Preliminary report shows no evidence of malignancy. Patient underwent endoscopic ultrasound-guided biopsy of mediastinal mass [place], she also had PEG tube placed for dysphagia. Pt with significant back pain because of mets to the spine. Without any pain. Awaiting results of the biopsy. Hospice consulted, will be decided whether she will qualify for inpatient hospice  2. Chronic obstructive pulmonary disease. There are significant emphysematous changes on the CT scan. Continue with nebulized bronchodilator treatments at this time. She is comfortable on room air. 3.  Back pain. This is due to compression fracture which may be pathological fractures. Advocate judicious pain control. Hematology/Oncology consulted. 4.  Anemia. 5.  For deep vein thrombosis prophylaxis, on sequential compression devices to the lower extremities. Patient has elevated WBC count and has been on IV Zosyn. Will sign off.     Maurice Yao MD  [facility]

## 2021-04-29 NOTE — HOSPICE
[facility]  [phone]     Patient Name: Sandra Bull  YOB: 1945  Age: 76 y.o. [facility] RN Note:  After hours call received from patient's daughterJoce inquiring has to how to proceed with hospice. She and father would like patient to go home but unsure if they are prepared/able to care for patient. Discussed hospice benefit at length. Hospice provider to evaluate patient tomorrow to determine appropriate level of care.      Ehsan eBe RN  Hospice Clinical Liaison  [phone]

## 2021-04-29 NOTE — PROGRESS NOTES
Progress Note Patient: Chago Virk MRN: [id number]  SSN: [id number] YOB: 1945  Age: 76 y.o. Sex: female Admit Date: 4/29/2021 LOS: 0 days Subjective:  
Patient seen in room, family at bedside. They had a meeting with Hospice. Patient is now on hospice care. They made her DNR. Objective: There were no vitals filed for this visit. Intake and Output: 
Current Shift: No intake/output data recorded. Last three shifts: No intake/output data recorded. Physical Exam:  
Skin:  Extremities and face reveal no rashes. No callaway erythema. Cachexic. HEENT: Sclerae anicteric. Extra-occular muscles are intact. No abnormal pigmentation of the lips. The neck is supple. Cardiovascular: Regular rate and rhythm. Respiratory:  Comfortable breathing with no accessory muscle use. GI:  Abdomen nondistended, soft, and nontender. No enlargement of the liver or spleen. No masses palpable. PEG in place Rectal:  Deferred Musculoskeletal: Extremities have good range of motion. Neurological:  Gross memory appears intact. Patient is alert and oriented. Psychiatric:  Mood appears appropriate with judgement intact. Lymphatic:  No visible adenopathy Lab/Data Review: 
Recent Results (from the past 24 hour(s)) CBC WITH AUTOMATED DIFF Collection Time: 04/29/21 12:05 PM  
Result Value Ref Range WBC 17.0 (H) 3.6 - 11.0 K/uL  
 RBC 3.68 (L) 3.80 - 5.20 M/uL HGB 9.1 (L) 11.5 - 16.0 g/dL HCT 31.0 (L) 35.0 - 47.0 % MCV 84.2 80.0 - 99.0 FL  
 MCH 24.7 (L) 26.0 - 34.0 PG  
 MCHC 29.4 (L) 30.0 - 36.5 g/dL  
 RDW 18.8 (H) 11.5 - 14.5 % PLATELET 652 (H) 531 - 400 K/uL MPV 8.8 (L) 8.9 - 12.9 FL  
 NEUTROPHILS 92 (H) 32 - 75 % LYMPHOCYTES 3 (L) 12 - 49 % MONOCYTES 5 5 - 13 % EOSINOPHILS 0 0 - 7 % BASOPHILS 0 0 - 1 % IMMATURE GRANULOCYTES 0 0.0 - 0.5 % ABS. NEUTROPHILS 15.6 (H) 1.8 - 8.0 K/UL  
 ABS. LYMPHOCYTES 0.5 (L) 0.8 - 3.5 K/UL  
 ABS. MONOCYTES 0.9 0.0 - 1.0 K/UL  
 ABS. EOSINOPHILS 0.0 0.0 - 0.4 K/UL  
 ABS. BASOPHILS 0.0 0.0 - 0.1 K/UL  
 ABS. IMM. GRANS. 0.1 (H) 0.00 - 0.04 K/UL  
 DF AUTOMATED METABOLIC PANEL, COMPREHENSIVE Collection Time: 04/29/21 12:05 PM  
Result Value Ref Range Sodium 149 (H) 136 - 145 mmol/L Potassium 3.2 (L) 3.5 - 5.1 mmol/L Chloride 116 (H) 97 - 108 mmol/L  
 CO2 31 21 - 32 mmol/L Anion gap 2 (L) 5 - 15 mmol/L Glucose 119 (H) 65 - 100 mg/dL BUN 35 (H) 6 - 20 mg/dL Creatinine 0.96 0.55 - 1.02 mg/dL BUN/Creatinine ratio 36 (H) 12 - 20 GFR est AA >60 >60 ml/min/1.73m2 GFR est non-AA 57 (L) >60 ml/min/1.73m2 Calcium 12.7 (H) 8.5 - 10.1 mg/dL Bilirubin, total 0.3 0.2 - 1.0 mg/dL AST (SGOT) 10 (L) 15 - 37 U/L  
 ALT (SGPT) 13 12 - 78 U/L Alk. phosphatase 107 45 - 117 U/L Protein, total 6.3 (L) 6.4 - 8.2 g/dL Albumin 2.2 (L) 3.5 - 5.0 g/dL Globulin 4.1 (H) 2.0 - 4.0 g/dL A-G Ratio 0.5 (L) 1.1 - 2.2 No orders to display Assessment:  
 
Active Problems: 
  Lung cancer ([facility] [address] (4/29/2021) Plan:  
 
Patient under hospice care and DNR status. GI will now sign off. Please re-consult as needed. Patient discussed with Dr Hoang Aparicio and agrees to above impression and plan. Thank you for allowing me to participate in this patients care Signed By: Mora Carmichael NP April 29, 2021

## 2021-04-29 NOTE — WOUND CARE
Wound Care Note:    Wound Care into see patient because of Declining Mingo Score of 12 / Risk Assessment    Skin Care & Pressure Relief Recommendations:  Minimize layers of linen  Pads under patient to optimize support surface and microclimate  Turn/reposition approximately every 2 hours. Pillow Wedges  Manage incontinence  Promote continence; Skin Protective lotion to buttocks and sacrum daily and as needed with incontinence care    Offload heels with pillows or offloading boots. Patient incontinent of urine. Recommend applying PureWick and mesh panties to keep in place. Discussed with Cynthia Rn. Apply zinc paste TID and prn for soiling to buttocks and sacrum. Float heels with 1-2 pillows while in bed for offloading of the heels. Ensure turning q2h on sides at 30 degrees or more if appropriate. Possible admit to hospice. Hospice nurse in room speaking with daughter. No skin breakdown noted in chart, confirmed with Cynthia ESPINO. Patient currently on Isoflex ZHAO with pressure reduction benefit. Re-consult WCN if needed.         Transition of Care: Plan to follow weekly while admitted to hospital.

## 2021-04-29 NOTE — H&P
[employer] Good Help to Those in Need 
[phone] Patient Name: Jamilah Phipps YOB: 1945 Date of Provider Hospice Visit: 04/29/21 Level of Care:   [x] General Inpatient (GIP)    [] Routine   [] Respite Current Location of Care: 
[] [facility] [] [facility] [] [facility] [] [facility] [] [facility], patient referred from: 
[] [facility] [] [facility] [] [facility] [] [facility] [] Home [] Other:  
 
Date of Original Hospice Admission: 4/29/21 Hospice Medical Director at time of admission: Dutch Carr Principle Hospice Diagnosis: Metastatic lung cancer Diagnoses RELATED to the terminal prognosis: Mets to T8-T9, severe PCM Other Diagnoses: Shannan Hernandez Jamilah Phipps is a 76y.o. year old who was admitted to [facility]. Patient is an unfortunate 79-year-old female admitted to [facility] with significant back pain, chest pain, profound weight loss. Patient has been declining over the last several months and according to the family has seen multiple different providers but unfortunately no specific diagnosis made. Patient underwent CT scan of the chest which shows a large mass centered at the interface between the right lower lobe in the mediastinum. The mass abuts several mediastinal structures including the esophagus and the heart with direct invasion occult to exclude. She also appears to have a mass abutting the vertebral bodies making a pathological fracture possible at T8 as well as erosion of the anterior right lateral aspect of the T9 vertebral body which appears to be suspicious for direct invasion by the mass. Patient underwent biopsy and PEG placement but is not a candidate for systemic chemotherapy. Patient only started receiving opioids yesterday for pain and has already had 6-7 IV doses of 0.5 mg IV Dilaudid and still showing evidence of significant pain and discomfort. Family already had elected to stop any type of PEG feedings. Prognosis appears to be limited to days to weeks. Patient unable to swallow given extensive tumor pain is in the chest as well as back area. Bin-hospice liaison and Mango Robertson, hospice social worker met with patient's daughter and  at the bedside. Patient was somnolent throughout this visit. We reviewed goals of care as well as what hospice can provide. We discussed all possibilities to include routine level care at home versus facility, GIP level care initiated at the hospital or the [facility] but knowing that routine level care cannot be done in the hospital so if she were to stabilize at [facility], we would need to transition her either home or to the [facility]. We discussed price [facility] $816 a day but this is not a long-term type of placement. We do think she meets criteria for GIP level care given her significant pain that is currently not managed and likely will need IV medication management/PCA even in the home setting given her dysphagia related to tumor burden. After this discussion, family is amenable to transition to inpatient hospice at [facility] and then we will reassess based on management of her pain. Explained to family that we do not continue IV antibiotics, fluids and will focus on her comfort with all different medications. Once again they are agreeable to this plan The patient's principle diagnosis has resulted in significant chest pain and back pain, dysphagia Refer to LCD Functionally, the patient's Karnofsky and/or Palliative Performance Scale has declined over a period of months and is estimated at 20-30 The patient is dependent on the following ADLs: All Objective information that support this patients limited prognosis includes:  
CT scan IMPRESSION 1. Large mass centered at the interface between the right lower lobe and the 
mediastinum, as detailed above, highly suspicious for neoplasm with leading 
differential consideration of lung cancer. 2. The mass abuts several mediastinal structures, including the esophagus and 
the heart with direct invasion difficult to exclude on this exam. 
3. T8 compression fracture of unknown chronicity. The mass also abuts the 
vertebral body making a pathologic fracture possible. 4. Erosion of the anterior right lateral aspect of the T9 vertebral body which 
is suspicious for direct invasion by the mass. 5. Enlarged right hilar lymph node, suspicious for disease involvement. 6. Indeterminate 4 mm right middle lobe solid nodule The patient/family chose comfort measures with the support of Hospice. HOSPICE DIAGNOSES Active Symptoms: 1. Cancer associated pain 2. Suspected metastatic lung cancerbiopsy done 2 days ago 3. Back and chest pain related to metastatic disease 4. Anxiety related to health 5. Hospice care patient PLAN 1. Patient will be admitted to [facility] care as she needs frequent nursing assessment, IV medication adjustment/management, not able to tolerate sublingual/oral medication given the dysphagia and the tumor abutting her esophagus, and need several days for pain management assessment before attempting to transition home 2. Given her CT scan results, the multiple doses of IV Dilaudid with still significant pain noted, patient likely will be best served by PCA which ultimately could be done in the home setting. Given the multiple doses of 0.5 mg IV that she is required but still showing evidence of pain, we will initiate Dilaudid PCA with just basal at 0.5 mg/h. We will also have nurse driven Dilaudid at 0.5 mg IV every 15 minutes as needed. 3. Could consider Decadron given that she has significant bony abnormalities related to the tumor burden but I think the Decadron may actually cause some discomfort as it may increase her appetite when her body is not able to tolerate food or water. I think best if we just use the opioids for pain management at this time 4. We have also ordered all other medications for symptom management to include nausea/vomiting, fever, agitation/restlessness/anxiety 5. Plan reviewed with bedside nurse, hospice liaisonBin and Adrianna Perez, hospice social worker 6. We did have a good discussion with patient's daughter and . They are tearful and quite frankly frustrated with the whole system as they thought patient should have been diagnosed earlier. We will need to follow them closely with bereavement 7.  and SW to support family needs 8. Disposition: To be determinedpatient may actually die in the hospital versus returning home 9. Hospice Plan of care was reviewed in detail and agree with current plan of care Prognosis estimated based on 04/29/21 clinical assessment is:  
[] Hours to Days [x] Days to Weeks   
[] Other: 
 
Communicated plan of care with: Hospice Case Manager; Hospice IDT; Care Team 
 
 GOALS OF CARE Patient/Medical POA stated Goal of Care: Hospice care 
 
[x] I have reviewed and/or updated ACP information in the Advance Care Planning Navigator. This information is available in the [facility] [address] link in the patient's chart header. Primary Decision [facility] Agent):   Primary Decision Maker: Panfilo Elizabeth Spouse - [phone] Resuscitation Status: DNR If DNR is there a Durable DNR on file? : [x] Yes [] No (If no, complete Durable DNR) HISTORY History obtained from: Chart, family, hospice liaison CHIEF COMPLAINT: Pain The patient is:  
[x] Verbal 
[] Nonverbal 
[] Unresponsive HPI/SUBJECTIVE: Patient is very somnolent, ill-appearing, says just a few words but showing significant evidence of furrowing of the brow and grimacing throughout this visit as she is in significant pain. REVIEW OF SYSTEMS The following systems were: [] reviewed  [x] unable to be reviewed Positive ROS include: 
Constitutional: fatigue, weakness, in pain, short of breath Ears/nose/mouth/throat: increased airway secretions Respiratory:shortness of breath, wheezing Gastrointestinal:poor appetite, nausea, vomiting, abdominal pain, constipation, diarrhea Musculoskeletal:pain, deformities, swelling legs Neurologic:confusion, hallucinations, weakness Psychiatric:anxiety, feeling depressed, poor sleep Endocrine:  
 
Adult Non-Verbal Pain Assessment Score: 5 Face 
[] 0   No particular expression or smile 
[] 1   Occasional grimace, tearing, frowning, wrinkled forehead 
[x] 2   Frequent grimace, tearing, frowning, wrinkled forehead Activity (movement) [x] 0   Lying quietly, normal position 
[] 1   Seeking attention through movement or slow, cautious movement 
[] 2   Restless, excessive activity and/or withdrawal reflexes Guarding 
[] 0   Lying quietly, no positioning of hands over areas of body 
[] 1   Splinting areas of the body, tense 
[x] 2   Rigid, stiff Physiology (vital signs) [x] 0   Stable vital signs [] 1   Change in any of the following: SBP > 20mm Hg; HR > 20/minute 
[] 2   Change in any of the following: SBP > 30mm Hg; HR > 25/minute Respiratory 
[] 0   Baseline RR/SpO2, compliant with ventilator 
[x] 1   RR > 10 above baseline, or 5% drop SpO2, mild asynchrony with ventilator 
[] 2   RR > 20 above baseline, or 10% drop SpO2, asynchrony with ventilator FUNCTIONAL ASSESSMENT Palliative Performance Scale (PPS): 20-30 PSYCHOSOCIAL/SPIRITUAL ASSESSMENT Active Problems: 
  Lung cancer ([facility] (4/29/2021) Past Medical History:  
Diagnosis Date  Anemia  Thrombocytosis ([facility] Past Surgical History:  
Procedure Laterality Date  HX GI    
 hemrrhoid Social History Tobacco Use  Smoking status: Current Every Day Smoker Packs/day: 1.00 Types: Cigarettes  Smokeless tobacco: Never Used Substance Use Topics  Alcohol use: Not Currently Family History Problem Relation Age of Onset  Cancer Mother   
     colon  Alzheimer Mother  Cancer Father   
     lung No Known Allergies Current Facility-Administered Medications Medication Dose Route Frequency  LORazepam (ATIVAN) injection 0.5 mg  0.5 mg IntraVENous Q15MIN PRN  
 ketorolac (TORADOL) injection 30 mg  30 mg IntraVENous Q8H PRN  
 glycopyrrolate (ROBINUL) injection 0.2 mg  0.2 mg IntraVENous Q4H PRN  
 bisacodyL (DULCOLAX) suppository 10 mg  10 mg Rectal DAILY PRN  
 HYDROmorphone (DILAUDID) syringe 0.5 mg  0.5 mg IntraVENous Q15MIN PRN  
 HYDROmorphone 15 mg/30 ml (DILAUDID) PCA   IntraVENous CONTINUOUS  
 sodium chloride (NS) flush 5-10 mL  5-10 mL IntraVENous Q8H PHYSICAL EXAM  
 
Wt Readings from Last 3 Encounters:  
04/23/21 37.2 kg (81 lb 15.8 oz) 04/01/21 43.1 kg (95 lb)  
03/17/21 43.5 kg (96 lb) There were no vitals taken for this visit. Supplemental O2  [x] Yes  [] NO Last bowel movement:  
 
Currently this patient has: 
[x] Peripheral IV [] PICC  [] PORT [] ICD [x] Simms Catheter [] NG Tube   [x] PEG Tube   
[] Rectal Tube [] Drain 
[] Other:  
 
Constitutional: Emaciated, ill-appearing, says just a few words, appears uncomfortable with furrowing of her brow, grimacing, moaning Eyes: Reactive ENMT: Dry 
Cardiovascular: Slightly tachycardic Respiratory: Decreased at the bases, tenderness palpation in the chest wall area, no significant accessory muscle use Gastrointestinal: Thin, PEG in place Musculoskeletal: Severe muscle wasting Skin: Unremarkable Neurologic: Nonfocal 
Psychiatric: Slightly restless Other:  
 
 
Pertinent Lab and or Imaging Tests: 
Lab Results Component Value Date/Time  Sodium 149 (H) 04/29/2021 12:05 PM  
 Potassium 3.2 (L) 04/29/2021 12:05 PM  
 Chloride 116 (H) 04/29/2021 12:05 PM  
 CO2 31 04/29/2021 12:05 PM  
 Anion gap 2 (L) 04/29/2021 12:05 PM  
 Glucose 119 (H) 04/29/2021 12:05 PM  
 BUN 35 (H) 04/29/2021 12:05 PM  
 Creatinine 0.96 04/29/2021 12:05 PM  
 BUN/Creatinine ratio 36 (H) 04/29/2021 12:05 PM  
 GFR est AA >60 04/29/2021 12:05 PM  
 GFR est non-AA 57 (L) 04/29/2021 12:05 PM  
 Calcium 12.7 (H) 04/29/2021 12:05 PM  
 
Lab Results Component Value Date/Time  Protein, total 6.3 (L) 04/29/2021 12:05 PM  
 Albumin 2.2 (L) 04/29/2021 12:05 PM  
 
   
 
Total time:  
Counseling / coordination time:  
> 50% counseling / coordination?:

## 2021-04-30 NOTE — PROGRESS NOTES
CM spoke to Dearl Chuck ESPINO from [facility] and she reported that patient was accepted to inpatient hospice.

## 2021-04-30 NOTE — HOSPICE
MSW met with patient, patient's spouse, and patient's son. MSW provided supportive counseling and presence to patient's family. MSW encouraged making the most of the time the family has with the patient. MSW did not ask about  home as this question was not emotionally appropriate at the time.      SHANAE Johnson  Hospice Social Worker  [phone]    Time in: 3:30 pm  Time Out: 4:00 pm

## 2021-04-30 NOTE — HOSPICE
Initial spiritual care visit with the patient,  and daughter. Patient was resting and did not respond to the . The  completed the spiritual assessment. Per the  they have not been involved in a [employer] for many years and do not have a desire to go to [employer] . They receive their support from one another and their family  The  shared stories about the patient and his family  The daughter was tearful and shared her grief. The  validated her emotions and normalized her grief. The  and daughter discussed end-of-life concerns.

## 2021-04-30 NOTE — PROGRESS NOTES
[employer] [facility] 
[phone] Patient Name: Jackeline Andrews YOB: 1945 Date of Provider Hospice Visit: 04/30/21 Level of Care:   [x] General Inpatient (GIP)    [] Routine   [] Respite Current Location of Care: 
[] [facility] [] [facility] [] [facility] [] [facility] [] [facility], patient referred from: 
[] [facility] [] [facility] [] [facility] [] [facility] [] Home [] Other:  
 
Date of Original Hospice Admission: 4/29/21 Hospice Medical Director at time of admission: Marta Choudhary Hospice Diagnosis: Metastatic lung cancer Diagnoses RELATED to the terminal prognosis: Mets to T8-T9, severe PCM Other Diagnoses: Ashly Andrews is a 76y.o. year old who was admitted to [facility]. Patient is an unfortunate 77-year-old female admitted to [facility] with significant back pain, chest pain, profound weight loss. Patient has been declining over the last several months and according to the family has seen multiple different providers but unfortunately no specific diagnosis made. Patient underwent CT scan of the chest which shows a large mass centered at the interface between the right lower lobe in the mediastinum. The mass abuts several mediastinal structures including the esophagus and the heart with direct invasion occult to exclude. She also appears to have a mass abutting the vertebral bodies making a pathological fracture possible at T8 as well as erosion of the anterior right lateral aspect of the T9 vertebral body which appears to be suspicious for direct invasion by the mass. Patient underwent biopsy and PEG placement but is not a candidate for systemic chemotherapy. Patient only started receiving opioids yesterday for pain and has already had 6-7 IV doses of 0.5 mg IV Dilaudid and still showing evidence of significant pain and discomfort. Family already had elected to stop any type of PEG feedings. Prognosis appears to be limited to days to weeks. Patient unable to swallow given extensive tumor pain is in the chest as well as back area. Bin-hospice liaison and David Adams, hospice social worker met with patient's daughter and  at the bedside. Patient was somnolent throughout this visit. We reviewed goals of care as well as what hospice can provide. We discussed all possibilities to include routine level care at home versus facility, GIP level care initiated at the hospital or the [facility] but knowing that routine level care cannot be done in the hospital so if she were to stabilize at [facility], we would need to transition her either home or to the [facility]. We discussed price [facility] $444 a day but this is not a long-term type of placement. We do think she meets criteria for GIP level care given her significant pain that is currently not managed and likely will need IV medication management/PCA even in the home setting given her dysphagia related to tumor burden. After this discussion, family is amenable to transition to inpatient hospice at [facility] and then we will reassess based on management of her pain. Explained to family that we do not continue IV antibiotics, fluids and will focus on her comfort with all different medications. Once again they are agreeable to this plan The patient's principle diagnosis has resulted in significant chest pain and back pain, dysphagia Refer to LCD Functionally, the patient's Karnofsky and/or Palliative Performance Scale has declined over a period of months and is estimated at 20-30 The patient is dependent on the following ADLs: All Objective information that support this patients limited prognosis includes:  
CT scan IMPRESSION 1. Large mass centered at the interface between the right lower lobe and the 
mediastinum, as detailed above, highly suspicious for neoplasm with leading 
differential consideration of lung cancer. 2. The mass abuts several mediastinal structures, including the esophagus and 
the heart with direct invasion difficult to exclude on this exam. 
3. T8 compression fracture of unknown chronicity. The mass also abuts the 
vertebral body making a pathologic fracture possible. 4. Erosion of the anterior right lateral aspect of the T9 vertebral body which 
is suspicious for direct invasion by the mass. 5. Enlarged right hilar lymph node, suspicious for disease involvement. 6. Indeterminate 4 mm right middle lobe solid nodule The patient/family chose comfort measures with the support of Hospice. HOSPICE DIAGNOSES Active Symptoms: 1. Cancer associated pain 2. Suspected metastatic lung cancerbiopsy done 2 days ago 3. Back and chest pain related to metastatic disease 4. Anxiety related to health 5. Hospice care patient 6. secretions PLAN 1. Patient will continue GIP level care. Patient needing ongoing frequent nursing assessment, IV medication management in the form of a PCA that was started yesterday, and showing evidence of further decline and concern about safety on transition home. Initially, patient's  and son present. We reviewed symptom management and they feel like she is much more comfortable and at peace with the Dilaudid PCA. Later, her daughter came to the room and I was able to briefly talk with her about the plan. 2. Pain managementcontinue Dilaudid PCA at 0.5 mg/h basal.  There are nurse driven doses for breakthrough pain and she has had 2 of those overnight. She appears much more comfortable and I think we can continue this dose for now. We will make adjustments accordingly 3. We have also ordered all other medications for symptom management to include nausea/vomiting, fever, agitation/restlessness/anxiety 4.  Plan reviewed with bedside nurse, hospice liaisonBin and Rhea Dubin, hospice social worker. Discussed options with family again. I think they are comfortable with patient staying at the hospital for now. Somehow she were to stabilize through the weekend, we could consider transition home but patient appears to be almost transitioning towards end-of-life and we talked about that with the family. My suspicion is she likely will pass here in the hospital 
5. Add Robinul 0.2 mg IV every 4 hours scheduled 6.  and SW to support family needs 7. Disposition: To be determinedpatient may actually die in the hospital versus returning home 8. Hospice Plan of care was reviewed in detail and agree with current plan of care Prognosis estimated based on 04/30/21 clinical assessment is:  
[] Hours to Days [x] Days to Weeks   
[] Other: 
 
Communicated plan of care with: Hospice Case Manager; Hospice IDT; Care Team 
 
 GOALS OF CARE Patient/Medical POA stated Goal of Care: Hospice care 
 
[x] I have reviewed and/or updated ACP information in the Advance Care Planning Navigator. This information is available in the [address] link in the patient's chart header. Primary Decision [facility] Agent):   Primary Decision Maker: Otoniel Amelie Spouse - [phone] Resuscitation Status: DNR If DNR is there a Durable DNR on file? : [x] Yes [] No (If no, complete Durable DNR) HISTORY History obtained from: Chart, family, hospice liaison CHIEF COMPLAINT: Pain The patient is:  
[x] Verbal 
[] Nonverbal 
[] Unresponsive HPI/SUBJECTIVE: Patient is very somnolent, ill-appearing, says just a few words but showing significant evidence of furrowing of the brow and grimacing throughout this visit as she is in significant pain. 4/30patient much less responsive but also appears much more comfortable. Still occasional grimacing and moaning but this is mainly with touch. REVIEW OF SYSTEMS The following systems were: [] reviewed  [x] unable to be reviewed Positive ROS include: 
Constitutional: fatigue, weakness, in pain, short of breath Ears/nose/mouth/throat: increased airway secretions Respiratory:shortness of breath, wheezing Gastrointestinal:poor appetite, nausea, vomiting, abdominal pain, constipation, diarrhea Musculoskeletal:pain, deformities, swelling legs Neurologic:confusion, hallucinations, weakness Psychiatric:anxiety, feeling depressed, poor sleep Endocrine:  
 
Adult Non-Verbal Pain Assessment Score:2 Face 
[] 0   No particular expression or smile 
[x] 1   Occasional grimace, tearing, frowning, wrinkled forehead 
[] 2   Frequent grimace, tearing, frowning, wrinkled forehead Activity (movement) [x] 0   Lying quietly, normal position 
[] 1   Seeking attention through movement or slow, cautious movement 
[] 2   Restless, excessive activity and/or withdrawal reflexes Guarding 
[] 0   Lying quietly, no positioning of hands over areas of body [x] 1   Splinting areas of the body, tense 
[] 2   Rigid, stiff Physiology (vital signs) [x] 0   Stable vital signs [] 1   Change in any of the following: SBP > 20mm Hg; HR > 20/minute 
[] 2   Change in any of the following: SBP > 30mm Hg; HR > 25/minute Respiratory [x] 0   Baseline RR/SpO2, compliant with ventilator 
[] 1   RR > 10 above baseline, or 5% drop SpO2, mild asynchrony with ventilator 
[] 2   RR > 20 above baseline, or 10% drop SpO2, asynchrony with ventilator FUNCTIONAL ASSESSMENT Palliative Performance Scale (PPS): 20 PSYCHOSOCIAL/SPIRITUAL ASSESSMENT Active Problems: 
  Lung cancer ([facility] (4/29/2021) Past Medical History:  
Diagnosis Date  Anemia  Thrombocytosis ([facility] Past Surgical History:  
Procedure Laterality Date  HX GI    
 hemrrhoid Social History Tobacco Use  Smoking status: Current Every Day Smoker Packs/day: 1.00 Types: Cigarettes  Smokeless tobacco: Never Used Substance Use Topics  Alcohol use: Not Currently Family History Problem Relation Age of Onset  Cancer Mother   
     colon  Alzheimer Mother  Cancer Father   
     lung No Known Allergies Current Facility-Administered Medications Medication Dose Route Frequency  LORazepam (ATIVAN) injection 0.5 mg  0.5 mg IntraVENous Q15MIN PRN  
 ketorolac (TORADOL) injection 30 mg  30 mg IntraVENous Q8H PRN  
 glycopyrrolate (ROBINUL) injection 0.2 mg  0.2 mg IntraVENous Q4H PRN  
 bisacodyL (DULCOLAX) suppository 10 mg  10 mg Rectal DAILY PRN  
 HYDROmorphone (DILAUDID) syringe 0.5 mg  0.5 mg IntraVENous Q15MIN PRN  
 HYDROmorphone 15 mg/30 ml (DILAUDID) PCA   IntraVENous CONTINUOUS  
 sodium chloride (NS) flush 5-10 mL  5-10 mL IntraVENous Q8H PHYSICAL EXAM  
 
Wt Readings from Last 3 Encounters:  
04/23/21 37.2 kg (81 lb 15.8 oz) 04/01/21 43.1 kg (95 lb)  
03/17/21 43.5 kg (96 lb) Visit Vitals /60 Pulse (!) 115 Temp 99.7 °F (37.6 °C) Resp 18 SpO2 95% Supplemental O2  [x] Yes  [] NO Last bowel movement:  
 
Currently this patient has: 
[x] Peripheral IV [] PICC  [] PORT [] ICD [x] Simms Catheter [] NG Tube   [x] PEG Tube   
[] Rectal Tube [] Drain 
[] Other:  
 
Constitutional: Emaciated, ill-appearing, attempts to say 1 word but just back off to sleep. Minimal grimacingonly showed grimacing with touch her Eyes: Reactive ENMT: Dry 
Cardiovascular: Slightly tachycardic Respiratory: Decreased at the bases, tenderness palpation in the chest wall area, no significant accessory muscle use, secretions noted Gastrointestinal: Thin, PEG in place Musculoskeletal: Severe muscle wasting Skin: Unremarkable Neurologic: Nonfocal 
Psychiatric: Slightly restless Other:  
 
 
Pertinent Lab and or Imaging Tests: 
Lab Results Component Value Date/Time  Sodium 149 (H) 04/29/2021 12:05 PM  
 Potassium 3.2 (L) 04/29/2021 12:05 PM  
 Chloride 116 (H) 04/29/2021 12:05 PM  
 CO2 31 04/29/2021 12:05 PM  
 Anion gap 2 (L) 04/29/2021 12:05 PM  
 Glucose 119 (H) 04/29/2021 12:05 PM  
 BUN 35 (H) 04/29/2021 12:05 PM  
 Creatinine 0.96 04/29/2021 12:05 PM  
 BUN/Creatinine ratio 36 (H) 04/29/2021 12:05 PM  
 GFR est AA >60 04/29/2021 12:05 PM  
 GFR est non-AA 57 (L) 04/29/2021 12:05 PM  
 Calcium 12.7 (H) 04/29/2021 12:05 PM  
 
Lab Results Component Value Date/Time  Protein, total 6.3 (L) 04/29/2021 12:05 PM  
 Albumin 2.2 (L) 04/29/2021 12:05 PM  
 
   
 
Total time:  
Counseling / coordination time:  
> 50% counseling / coordination?:

## 2021-04-30 NOTE — HOSPICE
[facility]  [phone]    Social Work Admission Note  Patient Name: Philippe Cordero  YOB: 1945  Age: 76 y.o. Date of Visit: 21  Facility of Care: [facility]  Patient Room: 422/01     Hospice Attending: Re Perez MD  Hospice Diagnosis: Lung cancer [facility]) [C34.90]    Level of Care:    [x]  GIP    []  Respite   []  Routine    Consents/NCD Documentation:     Consents Reviewed: Yes    Person Reviewed/Signed with: Kelvin Hyde    Right to NCD Reviewed: Yes    NCD Requested: No    Admission Nurse/Intake Notified NCD was requested: N/A    Planned Start of Care Date: 2021    Hospice Witness Representative: SHANAE Haney    NARRATIVE   MSW met with patient, patient's spouse Josefina Monteiro and daughter Nehemiah Mcfarland. Josefina Monteiro and Nehemiah Mcfarland shared the patient's disease process and how she was told her back pain was \"throwing her back out\" rather than cancer. Family only recently learned of patient's diagnosis. Patient's daughter Nehemiah Mcfarland was tearful throughout visit. Patient's spouse uses humor to cope and is realistic about patient's prognosis. Josefina Monteiro stated that he and the patient have been  for over 48 years. Josefina Monteiro stated that they are in discussion about  home but have not decided yet as this change in status has occurred quickly. MSW asked family to inform hospice of  home choice when they have made a decision. Josefina Monteiro stated that they also have a son Karen Shha who is local and has been visiting regularly. MSW will continue to be available to provide support.     ADVANCE CARE PLANNING    Code Status: DNR  Durable DNR: _ Yes  x No  Advance Care Planning 2021   Confirm Advance Directive None       Relationship Status:  []  Single     [x]        []      []  Domestic Partner     []  /  []  Common Law  []    []  Unknown    If in a relationship, name of partner/spouse: Kelvin Hyde  Duration of relationship: 50+ years    Advent: [employer]     Home: TBD - Family Discussing  Resources Provided: Bereavement Services Information    Social Work Initial Assessment     Gender:  female    Race/Ethnicity: (jase all that apply)  []  American [place] ([facility]) or [place] Native  []    []  Black or [place] American  []   or   []   or [place]  [x]  White  []  Unknown      Service:    []  Yes   []  No       [x]  Unknown  Appropriate for Pinning Ceremony:   []  Yes      [x]  No  Is patient using VA benefits? []  Yes      [x]  No     Primary Language: English  []   Needed  []   utilized during visit    Ability to express thoughts/needs/feelings  []  Expressed thoughts/feelings/needs without difficulty  [x]  Requires extra time and cuing  []  Speech limited single words  []  Uses only gestures (eye, blinking eye or head movement/pointing)  []  Unable to express thoughts/feelings/needs (speech unintelligible or inappropriate)  []  Unresponsive  Notes: Patient requires extra time due to pain. Mental Status:  [x]  Alert-oriented to:     [x]  Person     []  Place     []  Time  []  Comatose-responds to:    []   Verbal stimuli    []  Tactile stimuli    []  Painful stimuli  []  Forgetful  []  Disoriented/Confused  [x]  Lethargic  []  Agitated  []  Other (specify):    Notes:      Patients description of Illness/Current Health Status:    [x]  Patient unable to discuss - in too much pain. Did express desire to not be resuscitated.    []  Patient unwilling to discuss  []  (Specify)        Knowledge/Understanding of Disease Process  Patient:    [x]  Demonstrates knowledge/understanding of disease process   [x]  Demonstrates knowledge/understanding of treatment plan   []  Demonstrates knowledge/understanding of prognosis   []  Demonstrates acceptance of prognosis   [x]  Demonstrates knowledge/understanding of resuscitation status   []  Other (specify)  Caregiver:   [x] Demonstrates knowledge/understanding of disease process   [x]  Demonstrates knowledge/understanding of treatment plan   [x]  Demonstrates knowledge/understanding of prognosis   [x]  Demonstrates acceptance of prognosis   [x]  Demonstrates knowledge/understanding of resuscitation status   []  Other (specify)  Notes:      Patients living arrangement/care setting:  Use the PRIOR COLUMN when the PATIENTS current health status necessitated a change in his/her primary residence. Prior Current Response              [x]             []    Patients own home/residence              []             []    Home of family member/friend              []             []    Boarding home              []             []    Assisted living facility/intermediate center              []             []    Hospital/Acute care facility              []             []    Skilled nursing facility              []             []    Long term care facility/Nursing home              []             [x]    Hospice in Patient      Primary Caregiver:  []  No Primary Caregiver  Name of Primary Caregiver: Andrewkristina Beckett  Relationship or Primary Caregiver:    [x]  Spouse/Significant other       []  Natural Child        []  Step child       []  Sibling   []  Parent   []  Friend/Neighbor   []  Community/Alevism Volunteer   []  Paid help   []  Other (specify):___________  Notes:       Family members/Significant others:  Name: Shobha Botellocharliekai  Relationship: Spouse  Phone Number: [phone]  Actively involved in care? [x]  Yes  []  No    Name: Jen Huntley  Relationship: Daughter  Phone Number: [phone]  Actively involved in care? [x]  Yes  []  No    Name: Elia Gonzales  Relationship: Son  Phone Number:  Actively involved in care?   [x]  Yes  []  No    Social support systems: (select ONE best description)  [x]  Excellent social support system which includes three or more family members or friends  []  Good social support system which includes two or less members or friends  []  Eze Fitzgerald support which includes one family member or friend  []  Poor social support; no family members or friends; basically ALONE  Notes:      Emotional Status: (jase all that apply)    Patient Caregiver Response                 [x]                [x]    Mood/Affect stable and appropriate                   []                []    Angry                 [x]                []    Anxious                 []                []    Apprehensive                 []                []    Avoidant                 []                []    Clinging                 []                []    Depressed                 []                []    Distraught                 []                []    Elated                 []                []    Euphoric                 []                []    Fearful                 []                []    Flat Affect                 []                []    Helpless                 []                []    Hostile                 []                []    Impulsive                 []                []    Irritable                 []                []    Labile                 []                []    Manic                 []                []    Restlessness                 []                [x]    Sad                 []                []    Suspicious                 []                [x]    Tearful                 []                []    Withdrawn     Notes: Patient was in significant pain. Coping Skills (strengths/weakness):    Patient: Coping Skills (strength/weakness): Strong family support, pain and quick decline   Family/caregiver (strength/weakness): Strong mutual support, rapid decline of patient.       [place] of care (jase all that apply):     [x]  No burden evident   []  Family must administer medications   []  Illness causing financial strain   []  Family/Support feels overwhelmed   []  Family/Support sleep disturbed with patients care   []  Patients care causes extra physical stress  of death  []  Illness causes changes in family lifestyle  []  Illness impacting family/support employment  []  Family experiencing increased time demands  []  Patients behavior endangers family  []  Denial of patients illness  []  Concern over outcome of illness/fear  []  Patients behavior embarrassing to family   Notes:      Risk Factors: (jase all that apply):    [x]  No burden evident   []  Alcohol abuse  []  Financial resources inadequate to meet basic needs (food/house/etc)  []  Financial resources inadequate to meet health care needs (supplies/equipment/medications)  []  Food/nutrition resources inadequate  []  Home environment unsafe/inadequate for home care  []  Homicidal risk  []  Lives alone or without concerned relatives  []  Multiple medications/complex schedule  []  Physical limitations increase likelihood of falls  []  Plan of care/treatments complicated  []  Substance use/abuse  []  Suicidal risk  []  Visual impairment threatens safety/ability to perform self-care  []  Other (specify):     Abuse/Neglect (actual/potential risks):  [x]  No signs of abuse/neglect  []  History of abuse/neglect                 []  Physical          []  Sexual  []  History of domestic violence  []  Lacks adequate physical care  []  Lacks emotional nurturing/support  []  Lacks appropriate stimulation/cognitive experiences  []  Left alone inappropriately  []  Lacks necessary supervision  []  Inadequate or delayed medical care  []  Unsafe environment (i.e guns/drug use/history of violence in the home/etc.)  []  Bruising or other physical signs of injury present  []  Other (specify):  Notes:   []  Refer to child/adult protective services      Current Sources of Stress (in Addition to Current Illness):   [x]  None reported  []  Bills/Debt    []  Career/Job change    []   (short term)    []   (long term)    []  Death of a child (recent)    []  Death of a parent (recent)   []  Death of a spouse (recent)   []  Employment status changed   []  Family discord    []  Financial loss/Inadequate inther (specify):come  []  Job loss  []  Legal issues unresolved  []  Lifestyle change  []  Marital discord  []  Marriage within the last year  []  Paperwork (insurance/legal/etc) overwhelming  []  Separation/Divorce  []  Other (specify):  Notes:      Current [employer] Being Utilized     1. Interventions/Plan of Care     1. Assess social and emotional factors related to coping with end of life issues  2. Community resource planning/referral   3. Relocation to different care setting if/when symptoms stabilize  4. Discharge Planning     1. Should patient stabilize, patient will return home or to hospice house. Patient may pass at [facility].     SHANAE Assessment Completed by: SHANAE Richardson  04/30/21    Time In: 15:00       Time Out: 16:00

## 2021-04-30 NOTE — ROUTINE PROCESS
Bedside and Verbal shift change report given to A JYOTI RICCI RN (oncoming nurse) by Julia Sommer (offgoing nurse). Report included the following information SBAR, Kardex, Intake/Output, MAR, Accordion and Recent Results.

## 2021-05-01 NOTE — HOSPICE
[facility]  [phone]    Discharge/Death Nursing Note   Patient Name: Nevin Dykes  YOB: 1945  Age: 76 y.o.     Date of Death: 21  Admitted Date: 2021  Time of Death: [address]: [facility]  Level of Care: [facility]  Patient Room: [facility]     Hospice Attending: Brenda De Oliveira MD  Hospice Diagnosis: Lung cancer [facility]) [C34.90]    Death Pronouncement   Pronouncement of death completed by: Sergio Vizcaino, hospice RN    Agency staff was present at the time of death    At the time of death the patient was documented as:    No RR nor radial pulse for one full minute    The pt  within 4th floor    The following were notified of the patient's death: Rianna Marie, daughter, at bedside    Medications were disposed of per facility protocol     Discharge Summary   Discharge Reason: Death    Summary of Care Provided:    [x] Post mortem care provided by [facility] staff  [x] Notification of  home by nursing supervisor  [] Referrals/Community resources provided:   [] Goals completed  [] Durable Medical Equipment vendor notified     Disciplines involved: [x] RN [] SW [x]  [] LOGAN [] Vol [] PT [] OT [] ST [] [facility]    [x] IDT communication/notification    Attending Physician, Dr. Gus Patel, notified of death    Bereaved   See initial assessment     Advance Care Planning 2021   Confirm Advance Directive None

## 2021-05-01 NOTE — HOSPICE
[facility]  [phone]    Nursing Note   Patient Name: Jamilah Phipps  YOB: 1945  Age: 76 y.o. [facility] RN Note:      Entered room; patient in bed, unresponsive, daughter at bedside. Patient appeared very apneic, imminent. As this liaison was talking with daughter and assessing patient, patient stopped breathing:    No RR nor radial pulse for one full minute.     TOD: 1815

## 2021-05-01 NOTE — DISCHARGE SUMMARY
Hospice Discharge Summary    [facility]  [facility]        Date of Admission: 4/29/2021  Date of Discharge: 5/1/21    Jackeline Andrews is a 76y.o. year old who was admitted to [facility] at [facility] with a Hosp[facility] diagnosis [facility] [C34.90].       The patient's care was focused on comfort and the patient passed away on 5/1/21

## 2021-05-01 NOTE — PROGRESS NOTES
Patient  18:15. Daughter at bedside. Najma RN hospice nurse at bedside, pronounced pt. Dr. Guadalupe Richard notified by hospice nurse. [address] [place] [address] supervisor notified. [employer] called, and left voice message per prompts.

## 2021-05-01 NOTE — ROUTINE PROCESS
Bedside shift change report given to Alberto Shields RN (oncoming nurse) by Leander Miller (offgoing nurse).  Report included the following information SBAR.

## 2021-05-02 NOTE — PROGRESS NOTES
Associate Drew Spears responded to notice of patient's death, met patient's , daughter and son at bedside.  provided prayer, grief support, and words of caring.  advised family and staff of chaplains availability for follow up upon further referrals. This notation is entered by  supervisor Christiana Mcgraw.      Chaplain Jason Reno, [facility]    can be contacted by calling  and requesting  on call

## 2021-05-03 NOTE — HOSPICE
5/3/2021    MSW called and left a voicemail for spouse Claudine Dickey offering condolences and providing bereavement services information. MSW called and spoke to patient's daughter Maryann Botello and offered condolences. MSW provided validation of feeling at loss following quick decline. MSW provided detailed bereavement services information and sent bereavement services phone number to Maryann Botello via text after call was completed.      Time in: 9:03  Time Out: 9:10
